# Patient Record
Sex: FEMALE | Race: BLACK OR AFRICAN AMERICAN | NOT HISPANIC OR LATINO | Employment: OTHER | URBAN - METROPOLITAN AREA
[De-identification: names, ages, dates, MRNs, and addresses within clinical notes are randomized per-mention and may not be internally consistent; named-entity substitution may affect disease eponyms.]

---

## 2019-03-01 ENCOUNTER — HOSPITAL ENCOUNTER (EMERGENCY)
Facility: HOSPITAL | Age: 84
Discharge: HOME | End: 2019-03-01
Attending: EMERGENCY MEDICINE
Payer: MEDICARE

## 2019-03-01 VITALS
DIASTOLIC BLOOD PRESSURE: 73 MMHG | WEIGHT: 132 LBS | HEIGHT: 64 IN | SYSTOLIC BLOOD PRESSURE: 141 MMHG | TEMPERATURE: 98 F | OXYGEN SATURATION: 100 % | RESPIRATION RATE: 89 BRPM | HEART RATE: 64 BPM | BODY MASS INDEX: 22.53 KG/M2

## 2019-03-01 DIAGNOSIS — D64.9 ANEMIA, UNSPECIFIED TYPE: ICD-10-CM

## 2019-03-01 DIAGNOSIS — R53.83 FATIGUE, UNSPECIFIED TYPE: Primary | ICD-10-CM

## 2019-03-01 DIAGNOSIS — R53.1 WEAKNESS: ICD-10-CM

## 2019-03-01 LAB
ABO + RH BLD: NORMAL
ALBUMIN SERPL BCP-MCNC: 4.1 G/DL
ALP SERPL-CCNC: 69 U/L
ALT SERPL W/O P-5'-P-CCNC: 11 U/L
ANION GAP SERPL CALC-SCNC: 9 MMOL/L
AST SERPL-CCNC: 18 U/L
BASOPHILS # BLD AUTO: 0.03 K/UL
BASOPHILS NFR BLD: 0.9 %
BILIRUB SERPL-MCNC: 0.8 MG/DL
BILIRUB UR QL STRIP: NEGATIVE
BLD GP AB SCN CELLS X3 SERPL QL: NORMAL
BUN SERPL-MCNC: 22 MG/DL
CALCIUM SERPL-MCNC: 8.8 MG/DL
CHLORIDE SERPL-SCNC: 97 MMOL/L
CLARITY UR: CLEAR
CO2 SERPL-SCNC: 26 MMOL/L
COLOR UR: YELLOW
CREAT SERPL-MCNC: 0.8 MG/DL
DIFFERENTIAL METHOD: ABNORMAL
EOSINOPHIL # BLD AUTO: 0.2 K/UL
EOSINOPHIL NFR BLD: 4.7 %
ERYTHROCYTE [DISTWIDTH] IN BLOOD BY AUTOMATED COUNT: 12.8 %
EST. GFR  (AFRICAN AMERICAN): >60 ML/MIN/1.73 M^2
EST. GFR  (NON AFRICAN AMERICAN): >60 ML/MIN/1.73 M^2
FERRITIN SERPL-MCNC: 391 NG/ML
GLUCOSE SERPL-MCNC: 82 MG/DL
GLUCOSE UR QL STRIP: NEGATIVE
HCT VFR BLD AUTO: 31.9 %
HGB BLD-MCNC: 10.5 G/DL
HGB UR QL STRIP: ABNORMAL
IMM GRANULOCYTES # BLD AUTO: 0.02 K/UL
IMM GRANULOCYTES NFR BLD AUTO: 0.6 %
INR PPP: 1.1
IRON SERPL-MCNC: 71 UG/DL
KETONES UR QL STRIP: NEGATIVE
LEUKOCYTE ESTERASE UR QL STRIP: NEGATIVE
LYMPHOCYTES # BLD AUTO: 1 K/UL
LYMPHOCYTES NFR BLD: 29.5 %
MCH RBC QN AUTO: 30.6 PG
MCHC RBC AUTO-ENTMCNC: 32.9 G/DL
MCV RBC AUTO: 93 FL
MONOCYTES # BLD AUTO: 0.5 K/UL
MONOCYTES NFR BLD: 13.2 %
NEUTROPHILS # BLD AUTO: 1.8 K/UL
NEUTROPHILS NFR BLD: 51.1 %
NITRITE UR QL STRIP: NEGATIVE
NRBC BLD-RTO: 0 /100 WBC
PH UR STRIP: 6 [PH] (ref 5–8)
PLATELET # BLD AUTO: 190 K/UL
PMV BLD AUTO: 10.2 FL
POTASSIUM SERPL-SCNC: 3.6 MMOL/L
PROT SERPL-MCNC: 7.1 G/DL
PROT UR QL STRIP: NEGATIVE
PROTHROMBIN TIME: 13 SEC
RBC # BLD AUTO: 3.43 M/UL
SATURATED IRON: 26 %
SODIUM SERPL-SCNC: 132 MMOL/L
SP GR UR STRIP: 1.01 (ref 1–1.03)
TOTAL IRON BINDING CAPACITY: 272 UG/DL
TRANSFERRIN SERPL-MCNC: 184 MG/DL
TSH SERPL DL<=0.005 MIU/L-ACNC: 3.19 UIU/ML
URN SPEC COLLECT METH UR: ABNORMAL
UROBILINOGEN UR STRIP-ACNC: NEGATIVE EU/DL
VIT B12 SERPL-MCNC: 592 PG/ML
WBC # BLD AUTO: 3.42 K/UL

## 2019-03-01 PROCEDURE — 93005 ELECTROCARDIOGRAM TRACING: CPT

## 2019-03-01 PROCEDURE — 84165 PROTEIN E-PHORESIS SERUM: CPT | Mod: 26,,, | Performed by: PATHOLOGY

## 2019-03-01 PROCEDURE — 84165 PROTEIN E-PHORESIS SERUM: CPT

## 2019-03-01 PROCEDURE — 84165 PATHOLOGIST INTERPRETATION SPE: ICD-10-PCS | Mod: 26,,, | Performed by: PATHOLOGY

## 2019-03-01 PROCEDURE — 36415 COLL VENOUS BLD VENIPUNCTURE: CPT

## 2019-03-01 PROCEDURE — 82728 ASSAY OF FERRITIN: CPT

## 2019-03-01 PROCEDURE — 85610 PROTHROMBIN TIME: CPT

## 2019-03-01 PROCEDURE — 99284 EMERGENCY DEPT VISIT MOD MDM: CPT | Mod: 25

## 2019-03-01 PROCEDURE — 80053 COMPREHEN METABOLIC PANEL: CPT

## 2019-03-01 PROCEDURE — 82607 VITAMIN B-12: CPT

## 2019-03-01 PROCEDURE — 81003 URINALYSIS AUTO W/O SCOPE: CPT

## 2019-03-01 PROCEDURE — 84443 ASSAY THYROID STIM HORMONE: CPT

## 2019-03-01 PROCEDURE — 86901 BLOOD TYPING SEROLOGIC RH(D): CPT

## 2019-03-01 PROCEDURE — 85025 COMPLETE CBC W/AUTO DIFF WBC: CPT

## 2019-03-01 PROCEDURE — 83540 ASSAY OF IRON: CPT

## 2019-03-01 RX ORDER — LOSARTAN POTASSIUM 100 MG/1
100 TABLET ORAL DAILY
Status: ON HOLD | COMMUNITY
End: 2024-01-31

## 2019-03-01 RX ORDER — VIT C/E/ZN/COPPR/LUTEIN/ZEAXAN 250MG-90MG
1000 CAPSULE ORAL DAILY
COMMUNITY
End: 2021-08-06

## 2019-03-01 RX ORDER — LEVOTHYROXINE SODIUM 75 UG/1
75 TABLET ORAL DAILY
COMMUNITY
End: 2021-08-06

## 2019-03-01 RX ORDER — HYDROCHLOROTHIAZIDE 12.5 MG/1
12.5 CAPSULE ORAL DAILY
COMMUNITY
End: 2021-08-06 | Stop reason: SDUPTHER

## 2019-03-01 NOTE — ED NOTES
Received report and assumed care. Patient in bed with eyes closed resting quietly daughter at bedside.  Breathing even and unlabored NAD  Awaiting new orders.

## 2019-03-02 NOTE — ED PROVIDER NOTES
Encounter Date: 3/1/2019       History     Chief Complaint   Patient presents with    Weakness     x3days, pt reports labwork last wednesday with Dr. Emanuel that showed low blood count     88 y F with history of anemia presents with 3 d progressive fatigue     Pt est with Dr Emanuel who recently obtained labs revealing HCT 33    No fever  No chest pain, SOB or cough  No abd pain NVD  No dysuria               Review of patient's allergies indicates:  No Known Allergies  Past Medical History:   Diagnosis Date    Anemia     Constipation     Hypertension     Thyroid disease      History reviewed. No pertinent surgical history.  History reviewed. No pertinent family history.  Social History     Tobacco Use    Smoking status: Never Smoker   Substance Use Topics    Alcohol use: No     Frequency: Never    Drug use: No     Review of Systems   Constitutional: Positive for activity change, appetite change and fatigue.   HENT: Negative.    Respiratory: Negative.    Cardiovascular: Negative.    Gastrointestinal: Negative.    Genitourinary: Negative.    Musculoskeletal: Negative.    Skin: Negative.    Neurological: Negative.    Hematological: Negative.    All other systems reviewed and are negative.      Physical Exam     Initial Vitals [03/01/19 0830]   BP Pulse Resp Temp SpO2   (!) 159/81 68 18 98 °F (36.7 °C) 98 %      MAP       --         Physical Exam    Nursing note and vitals reviewed.  Constitutional: She appears well-developed and well-nourished. She is not diaphoretic. No distress.   HENT:   Head: Normocephalic and atraumatic.   Nose: Nose normal.   Mouth/Throat: Oropharynx is clear and moist. No oropharyngeal exudate.   Eyes: Conjunctivae and EOM are normal. Pupils are equal, round, and reactive to light. Right eye exhibits no discharge. Left eye exhibits no discharge. No scleral icterus.   Neck: Normal range of motion. Neck supple.   Cardiovascular: Normal rate, regular rhythm, normal heart sounds and intact  distal pulses. Exam reveals no gallop and no friction rub.    No murmur heard.  Pulmonary/Chest: Breath sounds normal. No respiratory distress. She has no wheezes. She has no rhonchi. She has no rales.   Abdominal: Soft. Bowel sounds are normal. She exhibits no distension and no mass. There is no tenderness. There is no rebound and no guarding.   Musculoskeletal: Normal range of motion. She exhibits no edema or tenderness.   Lymphadenopathy:     She has no cervical adenopathy.   Neurological: She is alert and oriented to person, place, and time. She has normal strength. No cranial nerve deficit or sensory deficit.   Skin: Skin is warm and dry. Capillary refill takes less than 2 seconds. No rash noted. No erythema. No pallor.   Psychiatric: Her affect is blunt. She is slowed. She is not actively hallucinating. She is attentive.         ED Course   Procedures  Labs Reviewed   CBC W/ AUTO DIFFERENTIAL - Abnormal; Notable for the following components:       Result Value    WBC 3.42 (*)     RBC 3.43 (*)     Hemoglobin 10.5 (*)     Hematocrit 31.9 (*)     Immature Granulocytes 0.6 (*)     All other components within normal limits   COMPREHENSIVE METABOLIC PANEL - Abnormal; Notable for the following components:    Sodium 132 (*)     All other components within normal limits   URINALYSIS, REFLEX TO URINE CULTURE - Abnormal; Notable for the following components:    Occult Blood UA Trace (*)     All other components within normal limits    Narrative:     Preferred Collection Type->Urine, Clean Catch   PROTIME-INR - Abnormal; Notable for the following components:    Prothrombin Time 13.0 (*)     All other components within normal limits   IRON AND TIBC - Abnormal; Notable for the following components:    Transferrin 184 (*)     All other components within normal limits   FERRITIN - Abnormal; Notable for the following components:    Ferritin 391 (*)     All other components within normal limits   TSH   FERRITIN   IRON AND TIBC    PROTEIN ELECTROPHORESIS, SERUM   VITAMIN B12   PROTEIN ELECTROPHORESIS, SERUM   VITAMIN B12   TYPE & SCREEN          Imaging Results    None          Medical Decision Making:   History:   I obtained history from: primary care / consultant.  Clinical Tests:   Lab Tests: Ordered and Reviewed  ED Management:  Discussed with Dr Emanuel after ED labs revealed stable anemia and no obvious infectious cause of fatigue     Reference labs sent per his request     Stable to DC home and follow up next week    Family understands                         Clinical Impression:       ICD-10-CM ICD-9-CM   1. Fatigue, unspecified type R53.83 780.79   2. Weakness R53.1 780.79   3. Anemia, unspecified type D64.9 285.9         Disposition:   Disposition: Discharged  Condition: Stable                        Boy García MD  03/02/19 1040

## 2019-03-04 LAB
ALBUMIN SERPL ELPH-MCNC: 3.87 G/DL
ALPHA1 GLOB SERPL ELPH-MCNC: 0.3 G/DL
ALPHA2 GLOB SERPL ELPH-MCNC: 0.75 G/DL
B-GLOBULIN SERPL ELPH-MCNC: 0.6 G/DL
GAMMA GLOB SERPL ELPH-MCNC: 1.08 G/DL
PATHOLOGIST INTERPRETATION SPE: NORMAL
PROT SERPL-MCNC: 6.6 G/DL

## 2021-08-03 ENCOUNTER — OFFICE VISIT (OUTPATIENT)
Dept: PODIATRY | Facility: CLINIC | Age: 86
End: 2021-08-03
Payer: MEDICARE

## 2021-08-03 VITALS
HEIGHT: 64 IN | WEIGHT: 132 LBS | DIASTOLIC BLOOD PRESSURE: 73 MMHG | BODY MASS INDEX: 22.53 KG/M2 | SYSTOLIC BLOOD PRESSURE: 139 MMHG | RESPIRATION RATE: 18 BRPM | HEART RATE: 74 BPM

## 2021-08-03 DIAGNOSIS — M20.41 HAMMER TOES OF BOTH FEET: ICD-10-CM

## 2021-08-03 DIAGNOSIS — L97.521 ULCER OF LEFT SECOND TOE, LIMITED TO BREAKDOWN OF SKIN: Primary | ICD-10-CM

## 2021-08-03 DIAGNOSIS — B35.1 ONYCHOMYCOSIS OF TOENAIL: ICD-10-CM

## 2021-08-03 DIAGNOSIS — R60.0 EDEMA, LOWER EXTREMITY: ICD-10-CM

## 2021-08-03 DIAGNOSIS — M20.42 HAMMER TOES OF BOTH FEET: ICD-10-CM

## 2021-08-03 DIAGNOSIS — M20.11 HALLUX ABDUCTO VALGUS, BILATERAL: ICD-10-CM

## 2021-08-03 DIAGNOSIS — L84 FOOT CALLUS: ICD-10-CM

## 2021-08-03 DIAGNOSIS — M20.12 HALLUX ABDUCTO VALGUS, BILATERAL: ICD-10-CM

## 2021-08-03 PROCEDURE — 99204 PR OFFICE/OUTPT VISIT, NEW, LEVL IV, 45-59 MIN: ICD-10-PCS | Mod: S$PBB,,, | Performed by: PODIATRIST

## 2021-08-03 PROCEDURE — 99999 PR PBB SHADOW E&M-EST. PATIENT-LVL IV: CPT | Mod: PBBFAC,,, | Performed by: PODIATRIST

## 2021-08-03 PROCEDURE — 99999 PR PBB SHADOW E&M-EST. PATIENT-LVL IV: ICD-10-PCS | Mod: PBBFAC,,, | Performed by: PODIATRIST

## 2021-08-03 PROCEDURE — 99204 OFFICE O/P NEW MOD 45 MIN: CPT | Mod: S$PBB,,, | Performed by: PODIATRIST

## 2021-08-03 PROCEDURE — 99214 OFFICE O/P EST MOD 30 MIN: CPT | Mod: PBBFAC | Performed by: PODIATRIST

## 2021-08-03 RX ORDER — HYDROCHLOROTHIAZIDE 12.5 MG/1
TABLET ORAL
COMMUNITY
End: 2022-04-15 | Stop reason: SDUPTHER

## 2021-08-03 RX ORDER — LOSARTAN POTASSIUM 100 MG/1
TABLET ORAL
COMMUNITY
End: 2021-08-06 | Stop reason: SDUPTHER

## 2021-08-03 RX ORDER — GATIFLOXACIN 5 MG/ML
SOLUTION/ DROPS OPHTHALMIC
COMMUNITY
End: 2021-08-06

## 2021-08-03 RX ORDER — TELMISARTAN 80 MG/1
TABLET ORAL
Status: ON HOLD | COMMUNITY
Start: 2021-06-22 | End: 2024-01-31

## 2021-08-03 RX ORDER — CYANOCOBALAMIN 1000 UG/ML
INJECTION, SOLUTION INTRAMUSCULAR; SUBCUTANEOUS
Status: ON HOLD | COMMUNITY
Start: 2021-06-22 | End: 2024-01-31

## 2021-08-03 RX ORDER — IRON,CARBONYL/ASCORBIC ACID 100-250 MG
TABLET ORAL
Status: ON HOLD | COMMUNITY
Start: 2021-07-01 | End: 2024-01-31

## 2021-08-03 RX ORDER — LEVOTHYROXINE SODIUM 100 UG/1
TABLET ORAL
COMMUNITY
Start: 2021-06-15 | End: 2022-10-29 | Stop reason: SDUPTHER

## 2021-08-03 RX ORDER — LEVOTHYROXINE SODIUM 75 UG/1
TABLET ORAL
COMMUNITY
End: 2021-08-06

## 2021-08-03 RX ORDER — PREDNISOLONE ACETATE 10 MG/ML
SUSPENSION/ DROPS OPHTHALMIC
COMMUNITY
End: 2021-08-06

## 2021-08-03 RX ORDER — FERROUS SULFATE 325(65) MG
TABLET ORAL
Status: ON HOLD | COMMUNITY
End: 2024-01-31

## 2021-08-03 RX ORDER — ERYTHROMYCIN 5 MG/G
OINTMENT OPHTHALMIC
Status: ON HOLD | COMMUNITY
Start: 2021-07-30 | End: 2024-01-31 | Stop reason: HOSPADM

## 2021-08-09 ENCOUNTER — TELEPHONE (OUTPATIENT)
Dept: PODIATRY | Facility: CLINIC | Age: 86
End: 2021-08-09

## 2021-08-09 DIAGNOSIS — L97.521 ULCER OF LEFT SECOND TOE, LIMITED TO BREAKDOWN OF SKIN: Primary | ICD-10-CM

## 2021-08-09 RX ORDER — DOXYCYCLINE 100 MG/1
100 CAPSULE ORAL EVERY 12 HOURS
Qty: 20 CAPSULE | Refills: 0 | Status: SHIPPED | OUTPATIENT
Start: 2021-08-09 | End: 2021-08-19

## 2021-08-31 ENCOUNTER — HOSPITAL ENCOUNTER (EMERGENCY)
Facility: HOSPITAL | Age: 86
Discharge: HOME | End: 2021-08-31
Attending: EMERGENCY MEDICINE
Payer: MEDICARE

## 2021-08-31 VITALS
TEMPERATURE: 99 F | WEIGHT: 121 LBS | RESPIRATION RATE: 16 BRPM | HEART RATE: 70 BPM | SYSTOLIC BLOOD PRESSURE: 158 MMHG | OXYGEN SATURATION: 99 % | BODY MASS INDEX: 20.66 KG/M2 | DIASTOLIC BLOOD PRESSURE: 77 MMHG | HEIGHT: 64 IN

## 2021-08-31 DIAGNOSIS — R53.1 WEAKNESS: ICD-10-CM

## 2021-08-31 LAB
ALBUMIN SERPL BCP-MCNC: 3.5 G/DL (ref 3.5–5.2)
ALP SERPL-CCNC: 71 U/L (ref 55–135)
ALT SERPL W/O P-5'-P-CCNC: 13 U/L (ref 10–44)
ANION GAP SERPL CALC-SCNC: 12 MMOL/L (ref 8–16)
AST SERPL-CCNC: 20 U/L (ref 10–40)
BACTERIA #/AREA URNS HPF: NORMAL /HPF
BASOPHILS # BLD AUTO: 0.02 K/UL (ref 0–0.2)
BASOPHILS NFR BLD: 0.4 % (ref 0–1.9)
BILIRUB SERPL-MCNC: 0.6 MG/DL (ref 0.1–1)
BILIRUB UR QL STRIP: NEGATIVE
BNP SERPL-MCNC: 40 PG/ML (ref 0–99)
BUN SERPL-MCNC: 17 MG/DL (ref 10–30)
CALCIUM SERPL-MCNC: 9.6 MG/DL (ref 8.7–10.5)
CHLORIDE SERPL-SCNC: 102 MMOL/L (ref 95–110)
CLARITY UR: CLEAR
CO2 SERPL-SCNC: 26 MMOL/L (ref 23–29)
COLOR UR: YELLOW
CREAT SERPL-MCNC: 0.8 MG/DL (ref 0.5–1.4)
DIFFERENTIAL METHOD: ABNORMAL
EOSINOPHIL # BLD AUTO: 0.1 K/UL (ref 0–0.5)
EOSINOPHIL NFR BLD: 2.7 % (ref 0–8)
ERYTHROCYTE [DISTWIDTH] IN BLOOD BY AUTOMATED COUNT: 13.3 % (ref 11.5–14.5)
EST. GFR  (AFRICAN AMERICAN): >60 ML/MIN/1.73 M^2
EST. GFR  (NON AFRICAN AMERICAN): >60 ML/MIN/1.73 M^2
GLUCOSE SERPL-MCNC: 82 MG/DL (ref 70–110)
GLUCOSE UR QL STRIP: NEGATIVE
HCT VFR BLD AUTO: 34.2 % (ref 37–48.5)
HGB BLD-MCNC: 11.4 G/DL (ref 12–16)
HGB UR QL STRIP: ABNORMAL
IMM GRANULOCYTES # BLD AUTO: 0.01 K/UL (ref 0–0.04)
IMM GRANULOCYTES NFR BLD AUTO: 0.2 % (ref 0–0.5)
KETONES UR QL STRIP: NEGATIVE
LEUKOCYTE ESTERASE UR QL STRIP: ABNORMAL
LYMPHOCYTES # BLD AUTO: 1.6 K/UL (ref 1–4.8)
LYMPHOCYTES NFR BLD: 32.8 % (ref 18–48)
MCH RBC QN AUTO: 31.2 PG (ref 27–31)
MCHC RBC AUTO-ENTMCNC: 33.3 G/DL (ref 32–36)
MCV RBC AUTO: 94 FL (ref 82–98)
MICROSCOPIC COMMENT: NORMAL
MONOCYTES # BLD AUTO: 0.5 K/UL (ref 0.3–1)
MONOCYTES NFR BLD: 10 % (ref 4–15)
NEUTROPHILS # BLD AUTO: 2.6 K/UL (ref 1.8–7.7)
NEUTROPHILS NFR BLD: 53.9 % (ref 38–73)
NITRITE UR QL STRIP: NEGATIVE
NRBC BLD-RTO: 0 /100 WBC
PH UR STRIP: 6 [PH] (ref 5–8)
PLATELET # BLD AUTO: 163 K/UL (ref 150–450)
PMV BLD AUTO: 11.3 FL (ref 9.2–12.9)
POTASSIUM SERPL-SCNC: 3.5 MMOL/L (ref 3.5–5.1)
PROT SERPL-MCNC: 6.7 G/DL (ref 6–8.4)
PROT UR QL STRIP: NEGATIVE
RBC # BLD AUTO: 3.65 M/UL (ref 4–5.4)
RBC #/AREA URNS HPF: 1 /HPF (ref 0–4)
SARS-COV-2 RDRP RESP QL NAA+PROBE: NEGATIVE
SODIUM SERPL-SCNC: 140 MMOL/L (ref 136–145)
SP GR UR STRIP: 1.01 (ref 1–1.03)
SQUAMOUS #/AREA URNS HPF: 0 /HPF
TROPONIN I SERPL DL<=0.01 NG/ML-MCNC: 0.03 NG/ML (ref 0–0.03)
URN SPEC COLLECT METH UR: 4
UROBILINOGEN UR STRIP-ACNC: NEGATIVE EU/DL
WBC # BLD AUTO: 4.79 K/UL (ref 3.9–12.7)
WBC #/AREA URNS HPF: 1 /HPF (ref 0–5)

## 2021-08-31 PROCEDURE — U0002 COVID-19 LAB TEST NON-CDC: HCPCS | Performed by: EMERGENCY MEDICINE

## 2021-08-31 PROCEDURE — 81000 URINALYSIS NONAUTO W/SCOPE: CPT | Performed by: EMERGENCY MEDICINE

## 2021-08-31 PROCEDURE — 80053 COMPREHEN METABOLIC PANEL: CPT | Performed by: EMERGENCY MEDICINE

## 2021-08-31 PROCEDURE — 93010 EKG 12-LEAD: ICD-10-PCS | Mod: ,,, | Performed by: INTERNAL MEDICINE

## 2021-08-31 PROCEDURE — 85025 COMPLETE CBC W/AUTO DIFF WBC: CPT | Performed by: EMERGENCY MEDICINE

## 2021-08-31 PROCEDURE — 93005 ELECTROCARDIOGRAM TRACING: CPT

## 2021-08-31 PROCEDURE — 84484 ASSAY OF TROPONIN QUANT: CPT | Performed by: EMERGENCY MEDICINE

## 2021-08-31 PROCEDURE — 99284 EMERGENCY DEPT VISIT MOD MDM: CPT | Mod: 25

## 2021-08-31 PROCEDURE — 83880 ASSAY OF NATRIURETIC PEPTIDE: CPT | Performed by: EMERGENCY MEDICINE

## 2021-08-31 PROCEDURE — 36415 COLL VENOUS BLD VENIPUNCTURE: CPT | Performed by: EMERGENCY MEDICINE

## 2021-08-31 PROCEDURE — 36000 PLACE NEEDLE IN VEIN: CPT

## 2021-08-31 PROCEDURE — 93010 ELECTROCARDIOGRAM REPORT: CPT | Mod: ,,, | Performed by: INTERNAL MEDICINE

## 2021-09-02 ENCOUNTER — OFFICE VISIT (OUTPATIENT)
Dept: PODIATRY | Facility: CLINIC | Age: 86
End: 2021-09-02
Payer: MEDICARE

## 2021-09-02 VITALS
DIASTOLIC BLOOD PRESSURE: 69 MMHG | SYSTOLIC BLOOD PRESSURE: 124 MMHG | HEIGHT: 64 IN | HEART RATE: 79 BPM | BODY MASS INDEX: 20.66 KG/M2 | WEIGHT: 121 LBS

## 2021-09-02 DIAGNOSIS — M20.42 HAMMER TOES OF BOTH FEET: ICD-10-CM

## 2021-09-02 DIAGNOSIS — M20.12 HALLUX ABDUCTO VALGUS, BILATERAL: ICD-10-CM

## 2021-09-02 DIAGNOSIS — M20.11 HALLUX ABDUCTO VALGUS, BILATERAL: ICD-10-CM

## 2021-09-02 DIAGNOSIS — L97.521 ULCER OF LEFT SECOND TOE, LIMITED TO BREAKDOWN OF SKIN: Primary | ICD-10-CM

## 2021-09-02 DIAGNOSIS — R60.0 EDEMA, LOWER EXTREMITY: ICD-10-CM

## 2021-09-02 DIAGNOSIS — M20.41 HAMMER TOES OF BOTH FEET: ICD-10-CM

## 2021-09-02 PROCEDURE — 99999 PR PBB SHADOW E&M-EST. PATIENT-LVL III: ICD-10-PCS | Mod: PBBFAC,,, | Performed by: PODIATRIST

## 2021-09-02 PROCEDURE — 99213 OFFICE O/P EST LOW 20 MIN: CPT | Mod: PBBFAC | Performed by: PODIATRIST

## 2021-09-02 PROCEDURE — 99999 PR PBB SHADOW E&M-EST. PATIENT-LVL III: CPT | Mod: PBBFAC,,, | Performed by: PODIATRIST

## 2021-09-02 PROCEDURE — 99213 OFFICE O/P EST LOW 20 MIN: CPT | Mod: S$PBB,,, | Performed by: PODIATRIST

## 2021-09-02 PROCEDURE — 99213 PR OFFICE/OUTPT VISIT, EST, LEVL III, 20-29 MIN: ICD-10-PCS | Mod: S$PBB,,, | Performed by: PODIATRIST

## 2021-10-02 ENCOUNTER — HOSPITAL ENCOUNTER (EMERGENCY)
Facility: HOSPITAL | Age: 86
Discharge: HOME | End: 2021-10-02
Attending: INTERNAL MEDICINE
Payer: MEDICARE

## 2021-10-02 VITALS
BODY MASS INDEX: 21.34 KG/M2 | WEIGHT: 125 LBS | RESPIRATION RATE: 18 BRPM | SYSTOLIC BLOOD PRESSURE: 156 MMHG | HEART RATE: 60 BPM | TEMPERATURE: 99 F | DIASTOLIC BLOOD PRESSURE: 75 MMHG | HEIGHT: 64 IN | OXYGEN SATURATION: 100 %
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
## 2021-10-02 DIAGNOSIS — S16.1XXA CERVICAL STRAIN, ACUTE, INITIAL ENCOUNTER: ICD-10-CM

## 2021-10-02 DIAGNOSIS — S00.83XA FOREHEAD CONTUSION, INITIAL ENCOUNTER: ICD-10-CM

## 2021-10-02 LAB
BASOPHILS # BLD AUTO: 0.03 K/UL (ref 0–0.2)
BASOPHILS NFR BLD: 0.8 % (ref 0–1.9)
DIFFERENTIAL METHOD: ABNORMAL
EOSINOPHIL # BLD AUTO: 0.3 K/UL (ref 0–0.5)
EOSINOPHIL NFR BLD: 7.3 % (ref 0–8)
ERYTHROCYTE [DISTWIDTH] IN BLOOD BY AUTOMATED COUNT: 13.7 % (ref 11.5–14.5)
HCT VFR BLD AUTO: 30.9 % (ref 37–48.5)
HGB BLD-MCNC: 10.1 G/DL (ref 12–16)
IMM GRANULOCYTES # BLD AUTO: 0.01 K/UL (ref 0–0.04)
IMM GRANULOCYTES NFR BLD AUTO: 0.3 % (ref 0–0.5)
LYMPHOCYTES # BLD AUTO: 0.9 K/UL (ref 1–4.8)
LYMPHOCYTES NFR BLD: 24.6 % (ref 18–48)
MCH RBC QN AUTO: 30.8 PG (ref 27–31)
MCHC RBC AUTO-ENTMCNC: 32.7 G/DL (ref 32–36)
MCV RBC AUTO: 94 FL (ref 82–98)
MONOCYTES # BLD AUTO: 0.4 K/UL (ref 0.3–1)
MONOCYTES NFR BLD: 11.5 % (ref 4–15)
NEUTROPHILS # BLD AUTO: 2 K/UL (ref 1.8–7.7)
NEUTROPHILS NFR BLD: 55.5 % (ref 38–73)
NRBC BLD-RTO: 0 /100 WBC
PLATELET # BLD AUTO: 176 K/UL (ref 150–450)
PMV BLD AUTO: 9.8 FL (ref 9.2–12.9)
RBC # BLD AUTO: 3.28 M/UL (ref 4–5.4)
WBC # BLD AUTO: 3.57 K/UL (ref 3.9–12.7)

## 2021-10-02 PROCEDURE — 70450 CT HEAD WITHOUT CONTRAST: ICD-10-PCS | Mod: 26,,, | Performed by: RADIOLOGY

## 2021-10-02 PROCEDURE — 99284 EMERGENCY DEPT VISIT MOD MDM: CPT | Mod: 25

## 2021-10-02 PROCEDURE — 72125 CT NECK SPINE W/O DYE: CPT | Mod: 26,,, | Performed by: RADIOLOGY

## 2021-10-02 PROCEDURE — 70450 CT HEAD/BRAIN W/O DYE: CPT | Mod: TC

## 2021-10-02 PROCEDURE — 70450 CT HEAD/BRAIN W/O DYE: CPT | Mod: 26,,, | Performed by: RADIOLOGY

## 2021-10-02 PROCEDURE — 85025 COMPLETE CBC W/AUTO DIFF WBC: CPT | Performed by: INTERNAL MEDICINE

## 2021-10-02 PROCEDURE — 72125 CT NECK SPINE W/O DYE: CPT | Mod: TC

## 2021-10-02 PROCEDURE — 72125 CT CERVICAL SPINE WITHOUT CONTRAST: ICD-10-PCS | Mod: 26,,, | Performed by: RADIOLOGY

## 2021-10-05 ENCOUNTER — OFFICE VISIT (OUTPATIENT)
Dept: PODIATRY | Facility: CLINIC | Age: 86
End: 2021-10-05
Payer: MEDICARE

## 2021-10-05 VITALS
HEART RATE: 79 BPM | RESPIRATION RATE: 18 BRPM | DIASTOLIC BLOOD PRESSURE: 68 MMHG | SYSTOLIC BLOOD PRESSURE: 140 MMHG | WEIGHT: 125 LBS | HEIGHT: 64 IN | BODY MASS INDEX: 21.34 KG/M2

## 2021-10-05 DIAGNOSIS — M20.11 HALLUX ABDUCTO VALGUS, BILATERAL: ICD-10-CM

## 2021-10-05 DIAGNOSIS — M20.42 HAMMER TOES OF BOTH FEET: ICD-10-CM

## 2021-10-05 DIAGNOSIS — L97.521 ULCER OF LEFT SECOND TOE, LIMITED TO BREAKDOWN OF SKIN: Primary | ICD-10-CM

## 2021-10-05 DIAGNOSIS — L84 FOOT CALLUS: ICD-10-CM

## 2021-10-05 DIAGNOSIS — R60.0 EDEMA, LOWER EXTREMITY: ICD-10-CM

## 2021-10-05 DIAGNOSIS — M20.41 HAMMER TOES OF BOTH FEET: ICD-10-CM

## 2021-10-05 DIAGNOSIS — M20.12 HALLUX ABDUCTO VALGUS, BILATERAL: ICD-10-CM

## 2021-10-05 PROCEDURE — 99214 OFFICE O/P EST MOD 30 MIN: CPT | Mod: S$PBB,,, | Performed by: PODIATRIST

## 2021-10-05 PROCEDURE — 99999 PR PBB SHADOW E&M-EST. PATIENT-LVL IV: ICD-10-PCS | Mod: PBBFAC,,, | Performed by: PODIATRIST

## 2021-10-05 PROCEDURE — 99214 OFFICE O/P EST MOD 30 MIN: CPT | Mod: PBBFAC | Performed by: PODIATRIST

## 2021-10-05 PROCEDURE — 99214 PR OFFICE/OUTPT VISIT, EST, LEVL IV, 30-39 MIN: ICD-10-PCS | Mod: S$PBB,,, | Performed by: PODIATRIST

## 2021-10-05 PROCEDURE — 99999 PR PBB SHADOW E&M-EST. PATIENT-LVL IV: CPT | Mod: PBBFAC,,, | Performed by: PODIATRIST

## 2021-10-05 RX ORDER — HYDROCHLOROTHIAZIDE 12.5 MG/1
12.5 CAPSULE ORAL EVERY MORNING
COMMUNITY
Start: 2021-09-28 | End: 2023-07-09

## 2022-01-11 ENCOUNTER — OFFICE VISIT (OUTPATIENT)
Dept: PODIATRY | Facility: CLINIC | Age: 87
End: 2022-01-11
Payer: MEDICARE

## 2022-01-11 VITALS
BODY MASS INDEX: 20.66 KG/M2 | HEIGHT: 64 IN | RESPIRATION RATE: 18 BRPM | SYSTOLIC BLOOD PRESSURE: 158 MMHG | DIASTOLIC BLOOD PRESSURE: 85 MMHG | WEIGHT: 121 LBS | HEART RATE: 73 BPM

## 2022-01-11 DIAGNOSIS — M79.675 PAIN AND SWELLING OF TOE OF LEFT FOOT: ICD-10-CM

## 2022-01-11 DIAGNOSIS — M20.42 HAMMERTOE OF SECOND TOE OF LEFT FOOT: Primary | ICD-10-CM

## 2022-01-11 DIAGNOSIS — M20.41 HAMMER TOES OF BOTH FEET: ICD-10-CM

## 2022-01-11 DIAGNOSIS — M20.42 HAMMER TOES OF BOTH FEET: ICD-10-CM

## 2022-01-11 DIAGNOSIS — R60.0 EDEMA, LOWER EXTREMITY: ICD-10-CM

## 2022-01-11 DIAGNOSIS — M20.11 HALLUX ABDUCTO VALGUS, BILATERAL: ICD-10-CM

## 2022-01-11 DIAGNOSIS — M20.12 HALLUX ABDUCTO VALGUS, BILATERAL: ICD-10-CM

## 2022-01-11 DIAGNOSIS — M79.89 PAIN AND SWELLING OF TOE OF LEFT FOOT: ICD-10-CM

## 2022-01-11 DIAGNOSIS — B35.1 ONYCHOMYCOSIS OF TOENAIL: ICD-10-CM

## 2022-01-11 DIAGNOSIS — L84 FOOT CALLUS: ICD-10-CM

## 2022-01-11 PROCEDURE — 99214 OFFICE O/P EST MOD 30 MIN: CPT | Mod: PBBFAC | Performed by: PODIATRIST

## 2022-01-11 PROCEDURE — 99214 PR OFFICE/OUTPT VISIT, EST, LEVL IV, 30-39 MIN: ICD-10-PCS | Mod: S$PBB,,, | Performed by: PODIATRIST

## 2022-01-11 PROCEDURE — 99999 PR PBB SHADOW E&M-EST. PATIENT-LVL IV: CPT | Mod: PBBFAC,,, | Performed by: PODIATRIST

## 2022-01-11 PROCEDURE — 99999 PR PBB SHADOW E&M-EST. PATIENT-LVL IV: ICD-10-PCS | Mod: PBBFAC,,, | Performed by: PODIATRIST

## 2022-01-11 PROCEDURE — 99214 OFFICE O/P EST MOD 30 MIN: CPT | Mod: S$PBB,,, | Performed by: PODIATRIST

## 2022-01-15 NOTE — PROGRESS NOTES
Subjective:       Patient ID: Re Arciniega is a 89 y.o. female.    Chief Complaint: Follow-up, Diabetes Mellitus, Heel Pain, Wound Check, and Callouses  Patient presents with her daughter with complaint of pain 2nd digit left foot, follow-up hammertoe with pre ulcerative callus, follow-up callus posterior heels bilateral.  Patient daughter states she is doing well, no significant changes in health or medications since last visit.  Patient does relate 2nd digit left foot remains sore but is improved.  Daughter states there has been no redness or drainage of the toe.  They have not been applying padding between the toes      Past Medical History:   Diagnosis Date    Anemia     Constipation     Hypertension     Thyroid disease      Past Surgical History:   Procedure Laterality Date    CHOLECYSTECTOMY      HYSTERECTOMY       History reviewed. No pertinent family history.  Social History     Socioeconomic History    Marital status:    Tobacco Use    Smoking status: Never Smoker    Smokeless tobacco: Never Used   Substance and Sexual Activity    Alcohol use: No    Drug use: No    Sexual activity: Never       Current Outpatient Medications   Medication Sig Dispense Refill    cyanocobalamin 1,000 mcg/mL injection       docusate sodium (COLACE) 50 MG capsule Take by mouth 2 (two) times daily.      erythromycin (ROMYCIN) ophthalmic ointment       EUTHYROX 100 mcg tablet       ferrous sulfate (FEOSOL) 325 mg (65 mg iron) Tab tablet ferrous sulfate 325 mg (65 mg iron) tablet      hydroCHLOROthiazide (HYDRODIURIL) 12.5 MG Tab hydrochlorothiazide 12.5 mg tablet      hydroCHLOROthiazide (MICROZIDE) 12.5 mg capsule Take 12.5 mg by mouth every morning.      iron-vitamin C 100-250 mg, ICAR-C, 100-250 mg Tab       losartan (COZAAR) 100 MG tablet Take 100 mg by mouth once daily.      telmisartan (MICARDIS) 80 MG Tab        No current facility-administered medications for this visit.     Review of patient's  "allergies indicates:  No Known Allergies    Review of Systems   HENT: Negative for congestion.    Respiratory: Negative for cough and shortness of breath.    Cardiovascular: Positive for leg swelling.   Musculoskeletal: Positive for gait problem.        Walker   All other systems reviewed and are negative.      Objective:      Vitals:    01/11/22 1552   BP: (!) 158/85   Pulse: 73   Resp: 18   Weight: 54.9 kg (121 lb)   Height: 5' 4" (1.626 m)     Physical Exam  Vitals and nursing note reviewed. Exam conducted with a chaperone present.   Constitutional:       General: She is not in acute distress.  Cardiovascular:      Pulses:           Dorsalis pedis pulses are 2+ on the right side and 2+ on the left side.        Posterior tibial pulses are 1+ on the right side and 1+ on the left side.   Pulmonary:      Effort: Pulmonary effort is normal.   Musculoskeletal:         General: Swelling and deformity present.      Right foot: Decreased range of motion. Deformity (hammertoes) and bunion present.      Left foot: Decreased range of motion. Deformity and bunion present.   Feet:      Right foot:      Skin integrity: Callus (Chronic symmetrical calluses posterior lateral heels, stable) present. No skin breakdown.      Toenail Condition: Right toenails are abnormally thick and long. Fungal disease present.     Left foot:      Skin integrity: Callus (Tender callus medial 2nd digit left foot, is improved with no discoloration or skin opening.  Mild edema, no erythema or calor. IPK plantar left heel., no pain, stable) present. No ulcer or skin breakdown.      Toenail Condition: Left toenails are abnormally thick and long. Fungal disease present.  Skin:     Capillary Refill: Capillary refill takes 2 to 3 seconds.   Neurological:      General: No focal deficit present.   Psychiatric:         Mood and Affect: Mood normal.         Behavior: Behavior normal.                                Assessment:       1. Hammertoe of second toe " of left foot    2. Pain and swelling of toe of left foot    3. Hallux abducto valgus, bilateral    4. Hammer toes of both feet    5. Edema, lower extremity    6. Foot callus    7. Onychomycosis of toenail        Plan:           Advised patient from the top of the toe not much change but area between the toes which was previously an open ulcer has improved significantly with a lot left callus.  Explained tenderness along the edge of the toe, the dark area on the medial aspect which she can see and feel is due to pressure and will not resolved in last she/family member helps to apply a thin light soft pad which is comfortable each morning and removed each evening.  We did review area can ulcerate again, check daily  Area was debrided, remains pre ulcerative with no complications.  Dispensed large silo post toe sock and showed patient how to apply.  She does feel she can apply it this pad daily and instructed always remove in the evening.  Do not wear to bed and make sure skin underneath stays clean and dry  Reviewed pre ulcerative heel calluses remain improved.  Are discolored but not as thick, appearance of skin much improved.  Areas were debrided posterior heel bilateral with no complications.  IPK plantar left heel debrided  Reviewed better maintenance of skin and we discussed maintenance of callus 2nd digit left foot.  Explained chronic swelling feet and lower legs contributes to pressure on all of these areas.  Monitor skin on the front of the legs for any changes, blistering, weeping or areas of redness or warmth  Reviewed fungal nails. Nails debrided in thickness reduced, reviewed better maintenance of nails, tea tree oil, soaking  Again reviewed appropriate shoes  Check feet daily, contact office with any area of concern which has not improved in a few days  Patient/family were in understanding and agreement with treatment plan.  I counseled the patient on their conditions, implications and medical  management.  Instructed patient/family to contact the office with any changes, questions, concerns, worsening of symptoms.   Total face-to-face time, exam, assessment, treatment, discussion, documentation 30 minutes, more than half this time spent on consultation and coordination of care.  Follow up prn 3 months      This note was created using M*[employer] voice recognition software that occasionally misinterpreted phrases or words.

## 2022-04-12 ENCOUNTER — OFFICE VISIT (OUTPATIENT)
Dept: PODIATRY | Facility: CLINIC | Age: 87
End: 2022-04-12
Payer: MEDICARE

## 2022-04-12 VITALS
HEART RATE: 76 BPM | BODY MASS INDEX: 20.77 KG/M2 | RESPIRATION RATE: 18 BRPM | DIASTOLIC BLOOD PRESSURE: 65 MMHG | WEIGHT: 121 LBS | SYSTOLIC BLOOD PRESSURE: 132 MMHG

## 2022-04-12 DIAGNOSIS — M20.42 HAMMERTOE OF SECOND TOE OF LEFT FOOT: ICD-10-CM

## 2022-04-12 DIAGNOSIS — L97.521 ULCER OF LEFT SECOND TOE, LIMITED TO BREAKDOWN OF SKIN: Primary | ICD-10-CM

## 2022-04-12 DIAGNOSIS — R60.0 EDEMA, LOWER EXTREMITY: ICD-10-CM

## 2022-04-12 DIAGNOSIS — L97.511 SKIN ULCER OF SECOND TOE, RIGHT, LIMITED TO BREAKDOWN OF SKIN: ICD-10-CM

## 2022-04-12 DIAGNOSIS — M20.12 HALLUX ABDUCTO VALGUS, BILATERAL: ICD-10-CM

## 2022-04-12 DIAGNOSIS — B35.1 ONYCHOMYCOSIS OF TOENAIL: ICD-10-CM

## 2022-04-12 DIAGNOSIS — M20.11 HALLUX ABDUCTO VALGUS, BILATERAL: ICD-10-CM

## 2022-04-12 PROCEDURE — 99214 OFFICE O/P EST MOD 30 MIN: CPT | Mod: S$PBB,,, | Performed by: PODIATRIST

## 2022-04-12 PROCEDURE — 99999 PR PBB SHADOW E&M-EST. PATIENT-LVL IV: CPT | Mod: PBBFAC,,, | Performed by: PODIATRIST

## 2022-04-12 PROCEDURE — 99214 PR OFFICE/OUTPT VISIT, EST, LEVL IV, 30-39 MIN: ICD-10-PCS | Mod: S$PBB,,, | Performed by: PODIATRIST

## 2022-04-12 PROCEDURE — 99999 PR PBB SHADOW E&M-EST. PATIENT-LVL IV: ICD-10-PCS | Mod: PBBFAC,,, | Performed by: PODIATRIST

## 2022-04-12 PROCEDURE — 99214 OFFICE O/P EST MOD 30 MIN: CPT | Mod: PBBFAC | Performed by: PODIATRIST

## 2022-04-12 RX ORDER — LEVOTHYROXINE SODIUM 88 UG/1
TABLET ORAL
Status: ON HOLD | COMMUNITY
Start: 2022-03-29 | End: 2024-01-31

## 2022-04-15 NOTE — PROGRESS NOTES
Subjective:       Patient ID: Re Arciniega is a 89 y.o. female.    Chief Complaint: Follow-up, Callouses, Nail Problem, Foot Pain, and Hammer Toe  Patient presents with her daughter for follow-up ulcer 2nd digit left foot, calluses both heels.  Patient relates applying moisturizer/lotion to the 2nd digit left foot daily as it helps with her pain.  Confirms wearing wide comfortable shoes, but left 2nd digit hurts all the time.  She is having a lot of pain due to callus on the bottom the right heel.  Relates calluses on the back of both heels are tender.  Will turn 89 tomorrow      Past Medical History:   Diagnosis Date    Anemia     Constipation     Hypertension     Thyroid disease      Past Surgical History:   Procedure Laterality Date    CHOLECYSTECTOMY      HYSTERECTOMY       History reviewed. No pertinent family history.  Social History     Socioeconomic History    Marital status:    Tobacco Use    Smoking status: Never Smoker    Smokeless tobacco: Never Used   Substance and Sexual Activity    Alcohol use: No    Drug use: No    Sexual activity: Never       Current Outpatient Medications   Medication Sig Dispense Refill    cyanocobalamin 1,000 mcg/mL injection       docusate sodium (COLACE) 50 MG capsule Take by mouth 2 (two) times daily.      erythromycin (ROMYCIN) ophthalmic ointment       EUTHYROX 100 mcg tablet       ferrous sulfate (FEOSOL) 325 mg (65 mg iron) Tab tablet ferrous sulfate 325 mg (65 mg iron) tablet      hydroCHLOROthiazide (MICROZIDE) 12.5 mg capsule Take 12.5 mg by mouth every morning.      iron-vitamin C 100-250 mg, ICAR-C, 100-250 mg Tab       levothyroxine (SYNTHROID) 88 MCG tablet       losartan (COZAAR) 100 MG tablet Take 100 mg by mouth once daily.      telmisartan (MICARDIS) 80 MG Tab        No current facility-administered medications for this visit.     Review of patient's allergies indicates:  No Known Allergies    Review of Systems   HENT: Negative for  congestion.    Respiratory: Negative for cough and shortness of breath.    Cardiovascular: Positive for leg swelling.   Musculoskeletal: Positive for gait problem.        Walker   All other systems reviewed and are negative.      Objective:      Vitals:    04/12/22 1525   BP: 132/65   Pulse: 76   Resp: 18   Weight: 54.9 kg (121 lb)     Physical Exam  Vitals and nursing note reviewed. Exam conducted with a chaperone present.   Constitutional:       General: She is not in acute distress.  Cardiovascular:      Pulses:           Dorsalis pedis pulses are 2+ on the right side and 2+ on the left side.        Posterior tibial pulses are 1+ on the right side and 1+ on the left side.   Pulmonary:      Effort: Pulmonary effort is normal.   Musculoskeletal:         General: Swelling and deformity present.      Right foot: Decreased range of motion. Deformity (hammertoes) and bunion present.      Left foot: Decreased range of motion. Deformity and bunion present.   Feet:      Right foot:      Skin integrity: Ulcer (Light pink area of depression, pre ulcerative medial 2nd digit right foot, no edema or drainage) and callus (callus posterior lateral heel, dry, stable. Tender IPK right heel, no discoloration) present. No skin breakdown.      Toenail Condition: Right toenails are long. Fungal disease present.     Left foot:      Skin integrity: Ulcer (Ulcer medial 2nd digit left foot has opened up again and presents as superficial granular tissue with some maceration, blister-like.  Digit is edematous, no calor or drainage) and callus (lateral left heel, dry, stable) present. No skin breakdown.      Toenail Condition: Left toenails are long. Fungal disease present.  Skin:     Capillary Refill: Capillary refill takes 2 to 3 seconds.   Neurological:      General: No focal deficit present.   Psychiatric:         Mood and Affect: Mood normal.         Behavior: Behavior normal.                                                             Assessment:       1. Ulcer of left second toe, limited to breakdown of skin    2. Skin ulcer of second toe, right, limited to breakdown of skin    3. Hammertoe of second toe of left foot    4. Hallux abducto valgus, bilateral    5. Edema, lower extremity    6. Onychomycosis of toenail        Plan:         Advised patient in daughter it is difficult area to treat between the toes due to constant rubbing and moisture.  Developing a scab over the area to start the healing process can become sore since it is hard and dry, however with the application of moisturizer lotion to the area this sore will never heal.  Patient runs the risk of developing a deeper sore and infection.  Advised it extremely important to find something soft dry comfortable between the toes.  Pointed out same condition starting 2nd digit right foot  Ulcer debrided 2nd digit left foot, area cleansed, wound starting 2nd digit right foot was cleansed and gentian violet applied to both areas with a light gauze and Coban dressings.  Encouraged patient to leave this on until in morning, can remove it, does not need to be covered would strongly encouraged to use something soft, gauze between these toes daily and avoid application of lotion  Sample gentian violet dispensed to daughter, apply once a week  Call immediately with any changes or concerns  Multiple calluses debrided posterior heel bilateral, IPK right heel, 5th digit right. No discoloration or areas of potential complications  Triple antibiotic ointment fabric bandage applied bottom right heel to soften, remove tomorrow, can apply lotion to this area daily   Reviewed better maintenance of nails. Nails debrided in thickness reduced, reviewed better maintenance of nails, tea tree oil, soaking  Reviewed bunions pressure on second digits, appropriate shoes  Check feet daily, contact office with any area of concern which has not improved in a few days  Patient/family were in understanding and  agreement with treatment plan.  I counseled the patient on their conditions, implications and medical management.  Instructed patient/family to contact the office with any changes, questions, concerns, worsening of symptoms.   Total face-to-face time, exam, assessment, treatment, discussion, documentation 30 minutes, more than half this time spent on consultation and coordination of care.  Follow up prn 3 months      This note was created using M*[employer] voice recognition software that occasionally misinterpreted phrases or words.

## 2022-07-28 ENCOUNTER — OFFICE VISIT (OUTPATIENT)
Dept: PODIATRY | Facility: CLINIC | Age: 87
End: 2022-07-28
Payer: MEDICARE

## 2022-07-28 VITALS
WEIGHT: 121 LBS | HEIGHT: 64 IN | BODY MASS INDEX: 20.66 KG/M2 | OXYGEN SATURATION: 97 % | SYSTOLIC BLOOD PRESSURE: 111 MMHG | HEART RATE: 87 BPM | DIASTOLIC BLOOD PRESSURE: 68 MMHG

## 2022-07-28 DIAGNOSIS — B35.1 ONYCHOMYCOSIS OF TOENAIL: ICD-10-CM

## 2022-07-28 DIAGNOSIS — M20.12 HALLUX ABDUCTO VALGUS, BILATERAL: ICD-10-CM

## 2022-07-28 DIAGNOSIS — R60.0 EDEMA, LOWER EXTREMITY: ICD-10-CM

## 2022-07-28 DIAGNOSIS — M79.89 PAIN AND SWELLING OF TOE OF LEFT FOOT: ICD-10-CM

## 2022-07-28 DIAGNOSIS — M79.675 PAIN AND SWELLING OF TOE OF LEFT FOOT: ICD-10-CM

## 2022-07-28 DIAGNOSIS — L84 FOOT CALLUS: ICD-10-CM

## 2022-07-28 DIAGNOSIS — L97.521 ULCER OF LEFT SECOND TOE, LIMITED TO BREAKDOWN OF SKIN: Primary | ICD-10-CM

## 2022-07-28 DIAGNOSIS — M20.11 HALLUX ABDUCTO VALGUS, BILATERAL: ICD-10-CM

## 2022-07-28 DIAGNOSIS — M20.42 HAMMERTOE OF SECOND TOE OF LEFT FOOT: ICD-10-CM

## 2022-07-28 PROCEDURE — 99999 PR PBB SHADOW E&M-EST. PATIENT-LVL IV: ICD-10-PCS | Mod: PBBFAC,,, | Performed by: PODIATRIST

## 2022-07-28 PROCEDURE — 99214 PR OFFICE/OUTPT VISIT, EST, LEVL IV, 30-39 MIN: ICD-10-PCS | Mod: S$PBB,,, | Performed by: PODIATRIST

## 2022-07-28 PROCEDURE — 99214 OFFICE O/P EST MOD 30 MIN: CPT | Mod: PBBFAC | Performed by: PODIATRIST

## 2022-07-28 PROCEDURE — 99214 OFFICE O/P EST MOD 30 MIN: CPT | Mod: S$PBB,,, | Performed by: PODIATRIST

## 2022-07-28 PROCEDURE — 99999 PR PBB SHADOW E&M-EST. PATIENT-LVL IV: CPT | Mod: PBBFAC,,, | Performed by: PODIATRIST

## 2022-08-01 NOTE — PROGRESS NOTES
Subjective:       Patient ID: Re Arciniega is a 89 y.o. female.    Chief Complaint: Follow-up and Wound Check (Toe pain/)  Patient presents with her daughter for follow-up pain and swelling toe, HAV, edema, hammertoe, with ulcer 2nd digit left foot, starting mildly 2nd digt right foot. Patient has been applying gentian violet to both toes herself and applying gauze between the toes daily. Not as painful unless touched.         Past Medical History:   Diagnosis Date    Anemia     Constipation     Hypertension     Thyroid disease      Past Surgical History:   Procedure Laterality Date    CHOLECYSTECTOMY      HYSTERECTOMY       History reviewed. No pertinent family history.  Social History     Socioeconomic History    Marital status:    Tobacco Use    Smoking status: Never Smoker    Smokeless tobacco: Never Used   Substance and Sexual Activity    Alcohol use: No    Drug use: No    Sexual activity: Never       Current Outpatient Medications   Medication Sig Dispense Refill    cyanocobalamin 1,000 mcg/mL injection       docusate sodium (COLACE) 50 MG capsule Take by mouth 2 (two) times daily.      erythromycin (ROMYCIN) ophthalmic ointment       EUTHYROX 100 mcg tablet       ferrous sulfate (FEOSOL) 325 mg (65 mg iron) Tab tablet ferrous sulfate 325 mg (65 mg iron) tablet      hydroCHLOROthiazide (MICROZIDE) 12.5 mg capsule Take 12.5 mg by mouth every morning.      iron-vitamin C 100-250 mg, ICAR-C, 100-250 mg Tab       levothyroxine (SYNTHROID) 88 MCG tablet       losartan (COZAAR) 100 MG tablet Take 100 mg by mouth once daily.      telmisartan (MICARDIS) 80 MG Tab        No current facility-administered medications for this visit.     Review of patient's allergies indicates:  No Known Allergies    Review of Systems   HENT: Negative for congestion.    Respiratory: Negative for cough and shortness of breath.    Cardiovascular: Positive for leg swelling.   Musculoskeletal: Positive for gait  "problem.        Walker   All other systems reviewed and are negative.      Objective:      Vitals:    07/28/22 1351   BP: 111/68   Pulse: 87   SpO2: 97%   Weight: 54.9 kg (121 lb)   Height: 5' 4" (1.626 m)     Physical Exam  Vitals and nursing note reviewed. Exam conducted with a chaperone present.   Constitutional:       General: She is not in acute distress.  Cardiovascular:      Pulses:           Dorsalis pedis pulses are 1+ on the right side and 1+ on the left side.        Posterior tibial pulses are 1+ on the right side and 1+ on the left side.   Pulmonary:      Effort: Pulmonary effort is normal.   Musculoskeletal:         General: Swelling, tenderness and deformity present.      Right lower leg: Edema present.      Left lower leg: Edema present.      Right foot: Decreased range of motion. Deformity (hammertoe 2nd b/l) and bunion present.      Left foot: Decreased range of motion. Deformity and bunion present.      Comments: Pain 2nd digit left    Feet:      Right foot:      Skin integrity: Ulcer (pre ulcerative callu medial 2nd digit right, smooth, no ulcer, stable with heavy gentian violet, at risk ) and callus (chronic discolored callus posterior lateral heels b/l) present. No skin breakdown.      Toenail Condition: Right toenails are long. Fungal disease present.     Left foot:      Skin integrity: Ulcer (pre ulcerative callus with edema, heavy gentian violet medial 2nd digit left foot, center ulcer remains closed, at high risk  ) and callus present. No skin breakdown.      Toenail Condition: Left toenails are long. Fungal disease present.  Skin:     Capillary Refill: Capillary refill takes 2 to 3 seconds.   Neurological:      General: No focal deficit present.   Psychiatric:         Mood and Affect: Mood normal.         Behavior: Behavior normal.                                                Assessment:       1. Ulcer of left second toe, limited to breakdown of skin    2. Hammertoe of second toe of left " foot    3. Pain and swelling of toe of left foot - Left Foot    4. Hallux abducto valgus, bilateral    5. Edema, lower extremity    6. Foot callus    7. Onychomycosis of toenail        Plan:           Reviewed hammertoes, bunions, constant pressure in this area and potential complications  Reviewed swelling, elevation, potential complications    Pre ulcerative callus debrided 2nd digit left foot, no opening, stable, improved, area cleansed, folded 2x2 gauze applied  Pre ulcerative callus debrided 2nd digit right foot, no opening, much improved, stable, area cleansed, folded 2x2 gauze applied  Advised patient and daughter areas are stable, improved, right especially, however gentian violet can be applied every 2nd or 3rd day and would be more effective if applied to only callused/sore area if possible  Showed patient how to apply thicker padding between the toes and encouraged to use more soft padding if comfortable  Multiple calluses debrided posterior heel bilateral. No discoloration or areas of potential complications  Reviewed better maintenance of nails. Nails debrided, thickness reduced, reviewed better maintenance of nails  Reviewed soaking in warm water and Epson salt few times a week which can be beneficial for the swelling and pain of the 2nd digit left foot, however family member would need to help her with this and the areas on the sides of both 2nd toes would need to be aired out, uncovered and skin thoroughly dry following soaking  Check feet daily, contact office with any area of concern which has not improved in a few days  Patient/family were in understanding and agreement with treatment plan.  I counseled the patient on their conditions, implications and medical management.  Instructed patient/family to contact the office with any changes, questions, concerns, worsening of symptoms.   Total face-to-face time, exam, assessment, treatment, discussion, documentation 30 minutes, more than half this time  spent on consultation and coordination of care.  Follow up prn 3 months      This note was created using yetu voice recognition software that occasionally misinterpreted phrases or words.

## 2022-10-27 ENCOUNTER — OFFICE VISIT (OUTPATIENT)
Dept: PODIATRY | Facility: CLINIC | Age: 87
End: 2022-10-27
Payer: MEDICARE

## 2022-10-27 VITALS
HEIGHT: 64 IN | HEART RATE: 75 BPM | BODY MASS INDEX: 20.35 KG/M2 | WEIGHT: 119.19 LBS | DIASTOLIC BLOOD PRESSURE: 78 MMHG | RESPIRATION RATE: 16 BRPM | SYSTOLIC BLOOD PRESSURE: 154 MMHG

## 2022-10-27 DIAGNOSIS — M20.11 HALLUX ABDUCTO VALGUS, BILATERAL: ICD-10-CM

## 2022-10-27 DIAGNOSIS — M79.89 PAIN AND SWELLING OF TOE OF LEFT FOOT: ICD-10-CM

## 2022-10-27 DIAGNOSIS — M79.675 PAIN AND SWELLING OF TOE OF LEFT FOOT: ICD-10-CM

## 2022-10-27 DIAGNOSIS — R60.0 EDEMA OF BOTH LOWER EXTREMITIES: ICD-10-CM

## 2022-10-27 DIAGNOSIS — B35.1 ONYCHOMYCOSIS OF TOENAIL: ICD-10-CM

## 2022-10-27 DIAGNOSIS — L84 FOOT CALLUS: ICD-10-CM

## 2022-10-27 DIAGNOSIS — L84 PRE-ULCERATIVE CALLUSES: ICD-10-CM

## 2022-10-27 DIAGNOSIS — M20.42 HAMMERTOE OF SECOND TOE OF LEFT FOOT: Primary | ICD-10-CM

## 2022-10-27 DIAGNOSIS — M20.12 HALLUX ABDUCTO VALGUS, BILATERAL: ICD-10-CM

## 2022-10-27 PROCEDURE — 99214 PR OFFICE/OUTPT VISIT, EST, LEVL IV, 30-39 MIN: ICD-10-PCS | Mod: S$PBB,,, | Performed by: PODIATRIST

## 2022-10-27 PROCEDURE — 99214 OFFICE O/P EST MOD 30 MIN: CPT | Mod: PBBFAC | Performed by: PODIATRIST

## 2022-10-27 PROCEDURE — 99999 PR PBB SHADOW E&M-EST. PATIENT-LVL IV: ICD-10-PCS | Mod: PBBFAC,,, | Performed by: PODIATRIST

## 2022-10-27 PROCEDURE — 99214 OFFICE O/P EST MOD 30 MIN: CPT | Mod: S$PBB,,, | Performed by: PODIATRIST

## 2022-10-27 PROCEDURE — 99999 PR PBB SHADOW E&M-EST. PATIENT-LVL IV: CPT | Mod: PBBFAC,,, | Performed by: PODIATRIST

## 2022-10-30 NOTE — PROGRESS NOTES
Subjective:       Patient ID: Re Arciniega is a 89 y.o. female.    Chief Complaint: Follow-up, Hammer Toe, Foot Swelling, Foot Pain, Callouses, and Foot Ulcer  Patient presents with her daughter for follow-up pain and swelling toe, HAV, edema, hammertoe, with ulcer 2nd digit right foot.  Patient relates pain 2nd digit left foot has improved significantly.  She has been using a soft make up pad between the toes which shows been fairly comfortable.  Is applying gentian violet to a few areas of pain, corners of big toenails, 5th digit right foot most painful today.        Past Medical History:   Diagnosis Date    Anemia     Constipation     Hypertension     Thyroid disease      Past Surgical History:   Procedure Laterality Date    CHOLECYSTECTOMY      HYSTERECTOMY       History reviewed. No pertinent family history.  Social History     Socioeconomic History    Marital status:    Tobacco Use    Smoking status: Never    Smokeless tobacco: Never   Substance and Sexual Activity    Alcohol use: No    Drug use: No    Sexual activity: Never       Current Outpatient Medications   Medication Sig Dispense Refill    cyanocobalamin 1,000 mcg/mL injection       docusate sodium (COLACE) 50 MG capsule Take by mouth 2 (two) times daily.      ferrous sulfate (FEOSOL) 325 mg (65 mg iron) Tab tablet ferrous sulfate 325 mg (65 mg iron) tablet      hydroCHLOROthiazide (MICROZIDE) 12.5 mg capsule Take 12.5 mg by mouth every morning.      iron-vitamin C 100-250 mg, ICAR-C, 100-250 mg Tab       levothyroxine (SYNTHROID) 88 MCG tablet       losartan (COZAAR) 100 MG tablet Take 100 mg by mouth once daily.      telmisartan (MICARDIS) 80 MG Tab       erythromycin (ROMYCIN) ophthalmic ointment        No current facility-administered medications for this visit.     Review of patient's allergies indicates:  No Known Allergies    Review of Systems   HENT:  Negative for congestion.    Respiratory:  Negative for cough and shortness of breath.   "  Cardiovascular:  Positive for leg swelling.   Musculoskeletal:  Positive for gait problem.        Walker   All other systems reviewed and are negative.    Objective:      Vitals:    10/27/22 1442   BP: (!) 154/78   Pulse: 75   Resp: 16   Weight: 54.1 kg (119 lb 3.2 oz)   Height: 5' 4" (1.626 m)     Physical Exam  Vitals and nursing note reviewed. Exam conducted with a chaperone present.   Constitutional:       General: She is not in acute distress.  Cardiovascular:      Pulses:           Dorsalis pedis pulses are 1+ on the right side and 1+ on the left side.        Posterior tibial pulses are 1+ on the right side and 1+ on the left side.   Pulmonary:      Effort: Pulmonary effort is normal.   Musculoskeletal:         General: Swelling, tenderness and deformity present.      Right lower leg: Edema present.      Left lower leg: Edema present.      Right foot: Decreased range of motion. Deformity (hammertoe 2nd b/l) and bunion present.      Left foot: Decreased range of motion. Deformity and bunion present.      Comments: Significant decrease in pain 2nd digit left, pain 5th digit right foot/painful lesion/callus    Feet:      Right foot:      Skin integrity: Erythema (medial 2nd digit right due to rubbing, no callus, no pain) and callus (Painful small well-defined raised callus distal lateral 5th digit right, chronic callus posterior right heel stable) present. No skin breakdown.      Toenail Condition: Right toenails are abnormally thick and long. Fungal disease present.     Left foot:      Skin integrity: Callus (Preulcerative callus medial 2nd digit left much improved, significant decrease in pain.  Callus plantar and posterior left heel stable) present. No skin breakdown.      Toenail Condition: Left toenails are abnormally thick and long. Fungal disease present.  Skin:     Capillary Refill: Capillary refill takes 2 to 3 seconds.   Neurological:      General: No focal deficit present.   Psychiatric:         Mood " and Affect: Mood normal.         Behavior: Behavior normal.                                          Assessment:       1. Hammertoe of second toe of left foot    2. Pain and swelling of toe of left foot - Left Foot    3. Hallux abducto valgus, bilateral    4. Pre-ulcerative calluses    5. Edema of both lower extremities    6. Foot callus    7. Onychomycosis of toenail        Plan:           Reviewed hammertoes, bunions, contracted 5th digit right, pressure on heels due to swelling.  Reviewed swelling, skin care, potential complications    Pre ulcerative callus debrided 2nd digit left foot with significant improvement, this area was debrided thoroughly since patient was having little to no pain of this toe today.  No skin opening, no infection, much improved in stable at this time  Pre ulcerative area 2nd digit right foot with some redness, depression due to pressure of the great toe, no callus, no skin break, no pain but advised patient gentian violet needs to be applied in this area and highly recommend she continue light comfortable padding between both 1st and 2nd digits on a daily basis  Where only wide shoes to accommodate these areas and we discussed contracted 5th digit right with callus  Multiple pre ulcerative calluses and other calluses debrided posterior heel bilateral, 5th digit right, 2nd digit left , plantar left heel  Reviewed care of nails. Nails debrided, thickness reduced  Reviewed soaking in warm water and Epson salt few times a week  Check feet daily, contact office with any area of concern which has not improved in a few days  Patient/family were in understanding and agreement with treatment plan.  I counseled the patient on their conditions, implications and medical management.  Instructed patient/family to contact the office with any changes, questions, concerns, worsening of symptoms.   Total face-to-face time, exam, assessment, treatment, discussion, documentation 30 minutes, more than half  this time spent on consultation and coordination of care.  Follow up prn 3 months      This note was created using [employer] voice recognition software that occasionally misinterpreted phrases or words.

## 2022-12-21 ENCOUNTER — HOSPITAL ENCOUNTER (EMERGENCY)
Facility: HOSPITAL | Age: 87
Discharge: HOME | End: 2022-12-21
Payer: MEDICARE

## 2022-12-21 VITALS
OXYGEN SATURATION: 99 % | HEIGHT: 63 IN | HEART RATE: 68 BPM | DIASTOLIC BLOOD PRESSURE: 63 MMHG | TEMPERATURE: 99 F | BODY MASS INDEX: 20.73 KG/M2 | RESPIRATION RATE: 18 BRPM | WEIGHT: 117 LBS | SYSTOLIC BLOOD PRESSURE: 124 MMHG

## 2022-12-21 DIAGNOSIS — N39.0 URINARY TRACT INFECTION WITHOUT HEMATURIA, SITE UNSPECIFIED: Primary | ICD-10-CM

## 2022-12-21 DIAGNOSIS — R10.9 FLANK PAIN: ICD-10-CM

## 2022-12-21 DIAGNOSIS — K59.00 CONSTIPATION, UNSPECIFIED CONSTIPATION TYPE: ICD-10-CM

## 2022-12-21 LAB
ALBUMIN SERPL BCP-MCNC: 3.8 G/DL (ref 3.5–5.2)
ALP SERPL-CCNC: 58 U/L (ref 55–135)
ALT SERPL W/O P-5'-P-CCNC: 10 U/L (ref 10–44)
ANION GAP SERPL CALC-SCNC: 12 MMOL/L (ref 8–16)
AST SERPL-CCNC: 17 U/L (ref 10–40)
BACTERIA #/AREA URNS HPF: ABNORMAL /HPF
BASOPHILS # BLD AUTO: 0.02 K/UL (ref 0–0.2)
BASOPHILS NFR BLD: 0.5 % (ref 0–1.9)
BILIRUB SERPL-MCNC: 0.5 MG/DL (ref 0.1–1)
BILIRUB UR QL STRIP: NEGATIVE
BUN SERPL-MCNC: 25 MG/DL (ref 10–30)
CALCIUM SERPL-MCNC: 9.1 MG/DL (ref 8.7–10.5)
CHLORIDE SERPL-SCNC: 105 MMOL/L (ref 95–110)
CLARITY UR: CLEAR
CO2 SERPL-SCNC: 25 MMOL/L (ref 23–29)
COLOR UR: YELLOW
CREAT SERPL-MCNC: 1 MG/DL (ref 0.5–1.4)
DIFFERENTIAL METHOD: ABNORMAL
EOSINOPHIL # BLD AUTO: 0.2 K/UL (ref 0–0.5)
EOSINOPHIL NFR BLD: 3.7 % (ref 0–8)
ERYTHROCYTE [DISTWIDTH] IN BLOOD BY AUTOMATED COUNT: 13.2 % (ref 11.5–14.5)
EST. GFR  (NO RACE VARIABLE): 52.9 ML/MIN/1.73 M^2
GLUCOSE SERPL-MCNC: 75 MG/DL (ref 70–110)
GLUCOSE UR QL STRIP: NEGATIVE
HCT VFR BLD AUTO: 31.7 % (ref 37–48.5)
HGB BLD-MCNC: 10.4 G/DL (ref 12–16)
HGB UR QL STRIP: NEGATIVE
IMM GRANULOCYTES # BLD AUTO: 0.01 K/UL (ref 0–0.04)
IMM GRANULOCYTES NFR BLD AUTO: 0.2 % (ref 0–0.5)
KETONES UR QL STRIP: NEGATIVE
LEUKOCYTE ESTERASE UR QL STRIP: ABNORMAL
LYMPHOCYTES # BLD AUTO: 0.9 K/UL (ref 1–4.8)
LYMPHOCYTES NFR BLD: 22.7 % (ref 18–48)
MCH RBC QN AUTO: 31.9 PG (ref 27–31)
MCHC RBC AUTO-ENTMCNC: 32.8 G/DL (ref 32–36)
MCV RBC AUTO: 97 FL (ref 82–98)
MICROSCOPIC COMMENT: ABNORMAL
MONOCYTES # BLD AUTO: 0.4 K/UL (ref 0.3–1)
MONOCYTES NFR BLD: 10 % (ref 4–15)
NEUTROPHILS # BLD AUTO: 2.6 K/UL (ref 1.8–7.7)
NEUTROPHILS NFR BLD: 62.9 % (ref 38–73)
NITRITE UR QL STRIP: NEGATIVE
NRBC BLD-RTO: 0 /100 WBC
OB PNL STL: NEGATIVE
PH UR STRIP: 5 [PH] (ref 5–8)
PLATELET # BLD AUTO: 161 K/UL (ref 150–450)
PMV BLD AUTO: 10.5 FL (ref 9.2–12.9)
POTASSIUM SERPL-SCNC: 4 MMOL/L (ref 3.5–5.1)
PROT SERPL-MCNC: 6.8 G/DL (ref 6–8.4)
PROT UR QL STRIP: NEGATIVE
RBC # BLD AUTO: 3.26 M/UL (ref 4–5.4)
SODIUM SERPL-SCNC: 142 MMOL/L (ref 136–145)
SP GR UR STRIP: 1.02 (ref 1–1.03)
SQUAMOUS #/AREA URNS HPF: ABNORMAL /HPF
URN SPEC COLLECT METH UR: ABNORMAL
UROBILINOGEN UR STRIP-ACNC: NEGATIVE EU/DL
WBC # BLD AUTO: 4.09 K/UL (ref 3.9–12.7)
WBC #/AREA URNS HPF: 20 /HPF (ref 0–5)

## 2022-12-21 PROCEDURE — 81000 URINALYSIS NONAUTO W/SCOPE: CPT | Performed by: NURSE PRACTITIONER

## 2022-12-21 PROCEDURE — 63600175 PHARM REV CODE 636 W HCPCS: Performed by: NURSE PRACTITIONER

## 2022-12-21 PROCEDURE — 82272 OCCULT BLD FECES 1-3 TESTS: CPT | Performed by: NURSE PRACTITIONER

## 2022-12-21 PROCEDURE — 80053 COMPREHEN METABOLIC PANEL: CPT | Performed by: NURSE PRACTITIONER

## 2022-12-21 PROCEDURE — 74018 RADEX ABDOMEN 1 VIEW: CPT | Mod: TC

## 2022-12-21 PROCEDURE — 96365 THER/PROPH/DIAG IV INF INIT: CPT

## 2022-12-21 PROCEDURE — 99284 EMERGENCY DEPT VISIT MOD MDM: CPT | Mod: 25

## 2022-12-21 PROCEDURE — 85025 COMPLETE CBC W/AUTO DIFF WBC: CPT | Performed by: NURSE PRACTITIONER

## 2022-12-21 PROCEDURE — 74018 XR ABDOMEN AP 1 VIEW: ICD-10-PCS | Mod: 26,,, | Performed by: RADIOLOGY

## 2022-12-21 PROCEDURE — 87086 URINE CULTURE/COLONY COUNT: CPT | Performed by: NURSE PRACTITIONER

## 2022-12-21 PROCEDURE — 74018 RADEX ABDOMEN 1 VIEW: CPT | Mod: 26,,, | Performed by: RADIOLOGY

## 2022-12-21 RX ORDER — POLYETHYLENE GLYCOL 3350 17 G/17G
17 POWDER, FOR SOLUTION ORAL DAILY
Qty: 30 PACKET | Refills: 0 | Status: SHIPPED | OUTPATIENT
Start: 2022-12-21

## 2022-12-21 RX ORDER — CEFDINIR 300 MG/1
300 CAPSULE ORAL 2 TIMES DAILY
Qty: 14 CAPSULE | Refills: 0 | Status: SHIPPED | OUTPATIENT
Start: 2022-12-21 | End: 2022-12-28

## 2022-12-21 RX ADMIN — CEFTRIAXONE 1 G: 1 INJECTION, SOLUTION INTRAVENOUS at 05:12

## 2022-12-21 NOTE — DISCHARGE INSTRUCTIONS
Take medication as prescribed, be sure to finish antibiotics.    Take MiraLax for next couple days, soon as have good bowel movement stop taking the MiraLax continue taking your stool softeners.    Follow-up primary care provider after finishing antibiotics to be sure bacteria is cleared from urine.    Return emergency room if symptoms worsen, you develop any new other worrisome symptom.

## 2022-12-21 NOTE — ED NOTES
In to see pt at this time. Pt aaox4. Pt states she has been having back pain for a few days with little to no relief, pt states it is mainly hurting all on the left side of her back states she doesn't think she did anything to hurt her back and that originally she thought it was gas pain but it has gotten worse. Pt states her pain is a 7/10 in her back. Pt has no other needs or complaints at this time.

## 2022-12-21 NOTE — ED PROVIDER NOTES
Encounter Date: 12/21/2022       History     Chief Complaint   Patient presents with    Back Pain     Patient is a 89-year-old female presents emergency room with family members complaining of left-sided back pain.  Patient states pain started 2-3 days ago has progressively gotten worse.  Patient denies any traumatic events.  Patient states he has noticed a odor to her urine over the past couple days.  Patient states she has had 2 bowel movements this morning, as noted them to be darker than normal.  Patient does take iron on a daily basis.  Patient states when her back pain started she thought was possibly gas, take Gas-X medication without any relief.  Patient denies having any fever, chills, nausea, vomiting, diarrhea, chest pain and shortness of breath.  No known sick contacts.    Review of patient's allergies indicates:  No Known Allergies  Past Medical History:   Diagnosis Date    Anemia     Constipation     Hypertension     Thyroid disease      Past Surgical History:   Procedure Laterality Date    CHOLECYSTECTOMY      HYSTERECTOMY       History reviewed. No pertinent family history.  Social History     Tobacco Use    Smoking status: Never    Smokeless tobacco: Never   Substance Use Topics    Alcohol use: No    Drug use: No     Review of Systems   Constitutional: Negative.    HENT: Negative.     Eyes: Negative.    Respiratory: Negative.     Cardiovascular: Negative.    Gastrointestinal: Negative.    Endocrine: Negative.    Genitourinary:  Positive for decreased urine volume and flank pain. Negative for difficulty urinating, dysuria, hematuria and urgency.   Musculoskeletal:  Positive for back pain.   Skin: Negative.    Allergic/Immunologic: Negative for food allergies.   Neurological: Negative.    Psychiatric/Behavioral: Negative.     All other systems reviewed and are negative.    Physical Exam     Initial Vitals [12/21/22 1509]   BP Pulse Resp Temp SpO2   (!) 152/71 71 19 98.9 °F (37.2 °C) 97 %      MAP        --         Physical Exam    Nursing note and vitals reviewed.  Constitutional: She appears well-developed and well-nourished. She is not diaphoretic. No distress.   HENT:   Head: Normocephalic and atraumatic.   Mouth/Throat: Oropharynx is clear and moist.   Eyes: Conjunctivae and EOM are normal. Pupils are equal, round, and reactive to light. No scleral icterus.   Neck:   Normal range of motion.  Cardiovascular:  Normal rate, regular rhythm, normal heart sounds and intact distal pulses.           No murmur heard.  Pulmonary/Chest: Breath sounds normal. No respiratory distress. She has no wheezes. She has no rhonchi. She has no rales.   Abdominal: Abdomen is soft. Bowel sounds are normal. She exhibits no distension. There is no abdominal tenderness. There is no rebound and no guarding.   Musculoskeletal:         General: Edema (1+ pitting lower extremity edema bilateral) present. No tenderness. Normal range of motion.      Cervical back: Normal range of motion.     Lymphadenopathy:     She has no cervical adenopathy.   Neurological: She is alert and oriented to person, place, and time. She has normal strength. No sensory deficit. GCS score is 15. GCS eye subscore is 4. GCS verbal subscore is 5. GCS motor subscore is 6.   Skin: Skin is warm and dry. Capillary refill takes 2 to 3 seconds.   Psychiatric: She has a normal mood and affect.       ED Course   Procedures  Labs Reviewed   URINALYSIS, REFLEX TO URINE CULTURE - Abnormal; Notable for the following components:       Result Value    Leukocytes, UA 1+ (*)     All other components within normal limits    Narrative:     Preferred Collection Type->Urine, Clean Catch  Specimen Source->Urine   COMPREHENSIVE METABOLIC PANEL - Abnormal; Notable for the following components:    eGFR 52.9 (*)     All other components within normal limits   CBC W/ AUTO DIFFERENTIAL - Abnormal; Notable for the following components:    RBC 3.26 (*)     Hemoglobin 10.4 (*)     Hematocrit 31.7  (*)     MCH 31.9 (*)     Lymph # 0.9 (*)     All other components within normal limits   URINALYSIS MICROSCOPIC - Abnormal; Notable for the following components:    WBC, UA 20 (*)     Bacteria Few (*)     All other components within normal limits    Narrative:     Preferred Collection Type->Urine, Clean Catch  Specimen Source->Urine   CULTURE, URINE   OCCULT BLOOD X 1, STOOL          Imaging Results              X-Ray Abdomen AP 1 View (KUB) (In process)                   X-Rays:   Independently Interpreted Readings:   Other Readings:  KUB     Patient appears to have some retained stool in the colon as well as nonspecific gas pattern.  Medications   cefTRIAXone (ROCEPHIN) 1 g/50 mL D5W IVPB (has no administration in time range)     Medical Decision Making:   Initial Assessment:   Patient seen examined emergency room.  Patient appears to be in no acute distress this time.  Assessment as noted above.  Differential Diagnosis:   GI bleed, UTI, kidney infection, muscle spasms, diverticulitis, diverticulosis, AAA  Clinical Tests:   Lab Tests: Ordered and Reviewed  The following lab test(s) were unremarkable: CBC and CMP       <> Summary of Lab: Urinalysis mostly normal, 1+ leukocytes, 20 WBC count with few bacteria noted.  Otherwise unremarkable  Radiological Study: Ordered and Reviewed  ED Management:  After patient seen examined emergency room, will check a UA and KUB, occult stool, CMP, CBC.    After labs reviewed and KUB reviewed, spleen findings with the patient, will treat patient with 1 g IV Rocephin.  Follow-up with [facility] for UTI.  Patient also encouraged to take MiraLax the next couple days to aid with cleaning her colon.  Encouraged patient and she does not have any improvement in 3-5 days follow-up primary care provider return emergency room.                        Clinical Impression:   Final diagnoses:  [R10.9] Flank pain  [N39.0] Urinary tract infection without hematuria, site unspecified  (Primary)  [K59.00] Constipation, unspecified constipation type        ED Disposition Condition    Discharge Stable          ED Prescriptions       Medication Sig Dispense Start Date End Date Auth. Provider    cefdinir (OMNICEF) 300 MG capsule Take 1 capsule (300 mg total) by mouth 2 (two) times daily. for 7 days 14 capsule 12/21/2022 12/28/2022 Cuauhtemoc Gonzalez NP    polyethylene glycol (GLYCOLAX) 17 gram PwPk Take 17 g by mouth once daily. 30 packet 12/21/2022 -- Cuauhtemoc Gonzalez NP          Follow-up Information       Follow up With Specialties Details Why Contact Info    Anthony Emanuel MD Family Medicine In 3 days If symptoms worsen, As needed [address]  [facility] [address] [phone]               Cuauhtemoc Gonzalez NP  12/21/22 4367

## 2022-12-23 LAB
BACTERIA UR CULT: NORMAL
BACTERIA UR CULT: NORMAL

## 2023-01-12 ENCOUNTER — OFFICE VISIT (OUTPATIENT)
Dept: PODIATRY | Facility: CLINIC | Age: 88
End: 2023-01-12
Payer: MEDICARE

## 2023-01-12 VITALS
HEART RATE: 69 BPM | SYSTOLIC BLOOD PRESSURE: 131 MMHG | RESPIRATION RATE: 16 BRPM | DIASTOLIC BLOOD PRESSURE: 76 MMHG | HEIGHT: 63 IN | BODY MASS INDEX: 20.73 KG/M2 | WEIGHT: 117 LBS

## 2023-01-12 DIAGNOSIS — B35.1 ONYCHOMYCOSIS OF TOENAIL: ICD-10-CM

## 2023-01-12 DIAGNOSIS — M20.42 HAMMER TOES OF BOTH FEET: ICD-10-CM

## 2023-01-12 DIAGNOSIS — M20.12 HALLUX ABDUCTO VALGUS, BILATERAL: ICD-10-CM

## 2023-01-12 DIAGNOSIS — L97.511 SKIN ULCER OF SECOND TOE OF RIGHT FOOT, LIMITED TO BREAKDOWN OF SKIN: ICD-10-CM

## 2023-01-12 DIAGNOSIS — L97.521 ULCER OF LEFT SECOND TOE, LIMITED TO BREAKDOWN OF SKIN: Primary | ICD-10-CM

## 2023-01-12 DIAGNOSIS — M20.11 HALLUX ABDUCTO VALGUS, BILATERAL: ICD-10-CM

## 2023-01-12 DIAGNOSIS — L84 PRE-ULCERATIVE CALLUSES: ICD-10-CM

## 2023-01-12 DIAGNOSIS — R60.0 EDEMA OF BOTH LOWER EXTREMITIES: ICD-10-CM

## 2023-01-12 DIAGNOSIS — M20.41 HAMMER TOES OF BOTH FEET: ICD-10-CM

## 2023-01-12 PROCEDURE — 99214 PR OFFICE/OUTPT VISIT, EST, LEVL IV, 30-39 MIN: ICD-10-PCS | Mod: S$PBB,,, | Performed by: PODIATRIST

## 2023-01-12 PROCEDURE — 99214 OFFICE O/P EST MOD 30 MIN: CPT | Mod: S$PBB,,, | Performed by: PODIATRIST

## 2023-01-12 PROCEDURE — 99999 PR PBB SHADOW E&M-EST. PATIENT-LVL IV: CPT | Mod: PBBFAC,,, | Performed by: PODIATRIST

## 2023-01-12 PROCEDURE — 99214 OFFICE O/P EST MOD 30 MIN: CPT | Mod: PBBFAC | Performed by: PODIATRIST

## 2023-01-12 PROCEDURE — 99999 PR PBB SHADOW E&M-EST. PATIENT-LVL IV: ICD-10-PCS | Mod: PBBFAC,,, | Performed by: PODIATRIST

## 2023-01-12 RX ORDER — HYDROCHLOROTHIAZIDE 12.5 MG/1
12.5 TABLET ORAL
COMMUNITY
Start: 2022-11-29 | End: 2023-01-16 | Stop reason: SDUPTHER

## 2023-01-16 NOTE — PROGRESS NOTES
Subjective:       Patient ID: Re Arciniega is a 89 y.o. female.    Chief Complaint: Follow-up, Foot Pain, Foot Swelling, Callouses, and Wound Check  Patient presents with her daughter for follow-up pain and swelling toe, HAV, edema, hammertoe, ulcer 2nd digit right foot.  Patient relates 2nd digit left foot most painful.  Has been applying moisturizer to her feet and applying socks, has not been using the gentian violet on callus/corns on the 2nd digits.  Pain level 2nd digit left foot 9/10.    Past Medical History:   Diagnosis Date    Anemia     Constipation     Hypertension     Thyroid disease      Past Surgical History:   Procedure Laterality Date    CHOLECYSTECTOMY      HYSTERECTOMY       History reviewed. No pertinent family history.  Social History     Socioeconomic History    Marital status:    Tobacco Use    Smoking status: Never    Smokeless tobacco: Never   Substance and Sexual Activity    Alcohol use: No    Drug use: No    Sexual activity: Never       Current Outpatient Medications   Medication Sig Dispense Refill    cyanocobalamin 1,000 mcg/mL injection       docusate sodium (COLACE) 50 MG capsule Take by mouth 2 (two) times daily.      erythromycin (ROMYCIN) ophthalmic ointment       ferrous sulfate (FEOSOL) 325 mg (65 mg iron) Tab tablet ferrous sulfate 325 mg (65 mg iron) tablet      hydroCHLOROthiazide (MICROZIDE) 12.5 mg capsule Take 12.5 mg by mouth every morning.      iron-vitamin C 100-250 mg, ICAR-C, 100-250 mg Tab       levothyroxine (SYNTHROID) 88 MCG tablet       losartan (COZAAR) 100 MG tablet Take 100 mg by mouth once daily.      polyethylene glycol (GLYCOLAX) 17 gram PwPk Take 17 g by mouth once daily. 30 packet 0    telmisartan (MICARDIS) 80 MG Tab        No current facility-administered medications for this visit.     Review of patient's allergies indicates:  No Known Allergies    Review of Systems   HENT:  Negative for congestion.    Respiratory:  Negative for cough and shortness  "of breath.    Cardiovascular:  Positive for leg swelling.   Musculoskeletal:  Positive for gait problem.        Walker   All other systems reviewed and are negative.    Objective:      Vitals:    01/12/23 1431   BP: 131/76   Pulse: 69   Resp: 16   Weight: 53.1 kg (117 lb)   Height: 5' 3" (1.6 m)     Physical Exam  Vitals and nursing note reviewed. Exam conducted with a chaperone present.   Constitutional:       General: She is not in acute distress.  Cardiovascular:      Pulses:           Dorsalis pedis pulses are 1+ on the right side and 1+ on the left side.        Posterior tibial pulses are 1+ on the right side and 1+ on the left side.   Pulmonary:      Effort: Pulmonary effort is normal.   Musculoskeletal:         General: Swelling, tenderness and deformity present.      Right lower leg: Edema present.      Left lower leg: Edema present.      Right foot: Decreased range of motion. Deformity (hammertoe 2nd b/l) and bunion present.      Left foot: Decreased range of motion. Deformity and bunion present.      Comments: Significant decrease in pain 2nd digit left, pain 5th digit right foot/painful lesion/callus    Feet:      Right foot:      Skin integrity: Ulcer (Ulcer limited to breakdown of the skin same location medial 2nd digit right bilateral due to hammertoe, bunion and chronic friction in this location.  No infection at this time) and callus (Painful deep IPK sub 4th met head right foot and plantar right heel) present. No erythema.      Toenail Condition: Right toenails are abnormally thick. Fungal disease present.     Left foot:      Skin integrity: Ulcer and callus (Callus plantar and posterior left heel stable) present. No erythema.      Toenail Condition: Left toenails are abnormally thick. Fungal disease present.  Skin:     Capillary Refill: Capillary refill takes 2 to 3 seconds.   Neurological:      General: No focal deficit present.   Psychiatric:         Behavior: Behavior normal. "                                              Assessment:       1. Ulcer of left second toe, limited to breakdown of skin    2. Skin ulcer of second toe of right foot, limited to breakdown of skin    3. Hammer toes of both feet    4. Hallux abducto valgus, bilateral    5. Edema of both lower extremities    6. Pre-ulcerative calluses    7. Onychomycosis of toenail        Plan:           Pointed out to patient and daughter she has developed an open ulcer the inside both 2nd digits due to pressure from the hammertoe and bunion.  Advised these areas need to be kept clean and dry, no moisturizer around the toes/between the toes.  She can apply it to the rest of the foot.  Needs to resume gentian violet daily to both of these toes with gauze in the 1st webspace to separate these toes.  Patient confirms she does have gentian violet at home.  Daughter will check areas.  Patient is at high risk for infection  Reviewed hammertoes, bunions, contracted digits   Reviewed swelling, skin care, potential complications    Ulcer 2nd digit bilateral debrided, cleansed with wound , gentian violet and gauze applied in the 1st webspace bilateral.  Preulcerative calluses debrided sub 3rd met head right foot and plantar right heel along with the posterior aspect of both heels.  These 4 areas are stable without complication at this time  Patient is at high risk  Reviewed care and maintenance of pre ulcerative calluses  Reviewed care of nails. Nails debrided, thickness reduced  Reviewed soaking in warm water and Epson salt few times a week  Check feet daily, contact office with any area of concern which has not improved in a few days  Patient/family were in understanding and agreement with treatment plan.  I counseled the patient on their conditions, implications and medical management.  Instructed patient/family to contact the office with any changes, questions, concerns, worsening of symptoms.   Total face-to-face time, exam,  assessment, treatment, discussion, documentation 30 minutes, more than half this time spent on consultation and coordination of care.  Follow up prn 6 weeks      This note was created using [employer] voice recognition software that occasionally misinterpreted phrases or words.

## 2023-02-06 DIAGNOSIS — R13.10 DYSPHAGIA, UNSPECIFIED TYPE: Primary | ICD-10-CM

## 2023-02-10 ENCOUNTER — HOSPITAL ENCOUNTER (OUTPATIENT)
Dept: RADIOLOGY | Facility: HOSPITAL | Age: 88
Discharge: HOME | End: 2023-02-10
Attending: STUDENT IN AN ORGANIZED HEALTH CARE EDUCATION/TRAINING PROGRAM
Payer: MEDICARE

## 2023-02-10 DIAGNOSIS — R13.10 DYSPHAGIA, UNSPECIFIED TYPE: ICD-10-CM

## 2023-02-10 PROCEDURE — 74220 X-RAY XM ESOPHAGUS 1CNTRST: CPT | Mod: 26,,, | Performed by: RADIOLOGY

## 2023-02-10 PROCEDURE — 74220 X-RAY XM ESOPHAGUS 1CNTRST: CPT | Mod: TC

## 2023-02-10 PROCEDURE — 25500020 PHARM REV CODE 255: Performed by: STUDENT IN AN ORGANIZED HEALTH CARE EDUCATION/TRAINING PROGRAM

## 2023-02-10 PROCEDURE — A9698 NON-RAD CONTRAST MATERIALNOC: HCPCS | Performed by: STUDENT IN AN ORGANIZED HEALTH CARE EDUCATION/TRAINING PROGRAM

## 2023-02-10 PROCEDURE — 74220 FL ESOPHAGRAM COMPLETE: ICD-10-PCS | Mod: 26,,, | Performed by: RADIOLOGY

## 2023-02-10 RX ADMIN — BARIUM SULFATE 200 ML: 0.6 SUSPENSION ORAL at 10:02

## 2023-02-10 RX ADMIN — BARIUM SULFATE 135 ML: 980 POWDER, FOR SUSPENSION ORAL at 10:02

## 2023-02-15 ENCOUNTER — OFFICE VISIT (OUTPATIENT)
Dept: SURGERY | Facility: CLINIC | Age: 88
End: 2023-02-15
Payer: MEDICARE

## 2023-02-15 VITALS
HEART RATE: 78 BPM | WEIGHT: 116 LBS | BODY MASS INDEX: 19.81 KG/M2 | SYSTOLIC BLOOD PRESSURE: 132 MMHG | DIASTOLIC BLOOD PRESSURE: 80 MMHG | RESPIRATION RATE: 16 BRPM | OXYGEN SATURATION: 96 % | HEIGHT: 64 IN

## 2023-02-15 DIAGNOSIS — K22.0 ACHALASIA: Primary | ICD-10-CM

## 2023-02-15 DIAGNOSIS — R13.10 DYSPHAGIA, UNSPECIFIED TYPE: ICD-10-CM

## 2023-02-15 PROCEDURE — 99203 OFFICE O/P NEW LOW 30 MIN: CPT | Mod: S$PBB,,, | Performed by: STUDENT IN AN ORGANIZED HEALTH CARE EDUCATION/TRAINING PROGRAM

## 2023-02-15 PROCEDURE — 99999 PR PBB SHADOW E&M-EST. PATIENT-LVL III: CPT | Mod: PBBFAC,,, | Performed by: STUDENT IN AN ORGANIZED HEALTH CARE EDUCATION/TRAINING PROGRAM

## 2023-02-15 PROCEDURE — 99203 PR OFFICE/OUTPT VISIT, NEW, LEVL III, 30-44 MIN: ICD-10-PCS | Mod: S$PBB,,, | Performed by: STUDENT IN AN ORGANIZED HEALTH CARE EDUCATION/TRAINING PROGRAM

## 2023-02-15 PROCEDURE — 99213 OFFICE O/P EST LOW 20 MIN: CPT | Mod: PBBFAC | Performed by: STUDENT IN AN ORGANIZED HEALTH CARE EDUCATION/TRAINING PROGRAM

## 2023-02-15 PROCEDURE — 99999 PR PBB SHADOW E&M-EST. PATIENT-LVL III: ICD-10-PCS | Mod: PBBFAC,,, | Performed by: STUDENT IN AN ORGANIZED HEALTH CARE EDUCATION/TRAINING PROGRAM

## 2023-02-15 NOTE — PROGRESS NOTES
[facility] Surgery H&P Note    Subjective:       Patient ID: Re Arciniega is a 89 y.o. female.    Chief Complaint: dysphagia  HPI:  Re Arciniega is a 89 y.o. female with anemia, constipation, hypertension, thyroid disease presents today for evaluation for endoscopy. Recent labwork showed mild anemia with Hgb 10.9. patient underwent upper and lower endoscopy in 2017 for the same issue. Colonoscopy was normal. 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 Esophagus stomach duodenum otherwise normal. She denies change in bowel habits or blood in stool. She does endorse difficulty swallowing. Began about 2-3 months ago. Occurs with liquids and solids. Sometimes feels choking sensation in the throat. Then food feels like it sticks in the chest. In 2021 she had a swallow evaluation at [place] and was recommended to stick with thin liquids and easy to chew foods. An esophogram ordered by me shows bird beak narrowing of the GE junction concerning for achalasia.    Past Medical History:   Diagnosis Date    Anemia     Constipation     Hypertension     Thyroid disease      Past Surgical History:   Procedure Laterality Date    CHOLECYSTECTOMY      HYSTERECTOMY       No family history on file.  Social History     Socioeconomic History    Marital status:    Tobacco Use    Smoking status: Never    Smokeless tobacco: Never   Substance and Sexual Activity    Alcohol use: No    Drug use: No    Sexual activity: Never       Current Outpatient Medications   Medication Sig Dispense Refill    cyanocobalamin 1,000 mcg/mL injection       docusate sodium (COLACE) 50 MG capsule Take by mouth 2 (two) times daily.      erythromycin (ROMYCIN) ophthalmic ointment       ferrous sulfate (FEOSOL) 325 mg (65 mg iron) Tab tablet ferrous sulfate 325 mg (65 mg iron) tablet      hydroCHLOROthiazide (MICROZIDE) 12.5 mg capsule Take 12.5 mg by mouth every morning.      iron-vitamin C 100-250 mg, ICAR-C,  "100-250 mg Tab       levothyroxine (SYNTHROID) 88 MCG tablet       polyethylene glycol (GLYCOLAX) 17 gram PwPk Take 17 g by mouth once daily. 30 packet 0    telmisartan (MICARDIS) 80 MG Tab       losartan (COZAAR) 100 MG tablet Take 100 mg by mouth once daily.       No current facility-administered medications for this visit.     Review of patient's allergies indicates:  No Known Allergies    Review of Systems   Constitutional:  Negative for activity change, appetite change, chills and fever.   HENT:  Positive for trouble swallowing. Negative for congestion, dental problem and ear discharge.    Eyes:  Negative for discharge and itching.   Respiratory:  Negative for apnea, choking and chest tightness.    Cardiovascular:  Negative for chest pain and leg swelling.   Gastrointestinal:  Negative for abdominal distention, abdominal pain, anal bleeding, constipation, diarrhea and nausea.   Endocrine: Negative for cold intolerance and heat intolerance.   Genitourinary:  Negative for difficulty urinating and dyspareunia.   Musculoskeletal:  Positive for gait problem. Negative for arthralgias and back pain.   Skin:  Negative for color change and pallor.   Neurological:  Negative for dizziness and facial asymmetry.   Hematological:  Negative for adenopathy. Does not bruise/bleed easily.   Psychiatric/Behavioral:  Negative for agitation and behavioral problems.      Objective:      Vitals:    02/15/23 1106   BP: 132/80   Pulse: 78   Resp: 16   SpO2: 96%   Weight: 52.6 kg (116 lb)   Height: 5' 4" (1.626 m)     Physical Exam  Constitutional:       General: She is not in acute distress.     Appearance: She is well-developed.   HENT:      Head: Normocephalic and atraumatic.   Eyes:      Pupils: Pupils are equal, round, and reactive to light.   Neck:      Thyroid: No thyromegaly.   Cardiovascular:      Rate and Rhythm: Normal rate and regular rhythm.   Pulmonary:      Effort: Pulmonary effort is normal.      Breath sounds: Normal " breath sounds.   Abdominal:      General: Bowel sounds are normal. There is no distension.      Palpations: Abdomen is soft.      Tenderness: There is no abdominal tenderness.   Musculoskeletal:         General: No deformity. Normal range of motion.      Cervical back: Normal range of motion and neck supple.   Skin:     General: Skin is warm.      Capillary Refill: Capillary refill takes less than 2 seconds.      Findings: No erythema.   Neurological:      Mental Status: She is alert and oriented to person, place, and time.      Cranial Nerves: No cranial nerve deficit.   Psychiatric:         Behavior: Behavior normal.       Lab Review: CBC:   Lab Results   Component Value Date    WBC 4.09 12/21/2022    RBC 3.26 (L) 12/21/2022    HGB 10.4 (L) 12/21/2022    HCT 31.7 (L) 12/21/2022     12/21/2022     BMP:   Lab Results   Component Value Date    GLU 75 12/21/2022     12/21/2022    K 4.0 12/21/2022     12/21/2022    CO2 25 12/21/2022    BUN 25 12/21/2022    CREATININE 1.0 12/21/2022    CALCIUM 9.1 12/21/2022     Diagnostics Review: X-Ray: Reviewed   Abdominal xray normal  FL esophagram suggestive of achalasia  Assessment:       1. Dysphagia, unspecified type    2. Achalasia          Plan:   Dysphagia, unspecified type    Achalasia        Medical Decision Making/Counseling:  Mild anemia, negative findings on endoscopy in 2017  Patient and family not interested in colonoscopy at this time  Dysphagia is bothersome to patient  Referral to Dr. Geoff Rushing at [facility] for achalasia as we do not have the capabilities at this facility  Will likely need manometry  Offered to perform EGD here, patient and family declined    Patient instructed that best way to communicate with my office staff is for patient to get on the [facility] epic patient portal to expedite communication and communication issues that may occur.  Patient was given instructions on how to get on the portal.  I encouraged patient to  obtain portal access as well.  Ultimately it is up to the patient to obtain access.  Patient voiced understanding.

## 2023-02-17 ENCOUNTER — TELEPHONE (OUTPATIENT)
Dept: SURGERY | Facility: CLINIC | Age: 88
End: 2023-02-17
Payer: MEDICARE

## 2023-02-17 DIAGNOSIS — K22.0 ACHALASIA: Primary | ICD-10-CM

## 2023-02-17 NOTE — TELEPHONE ENCOUNTER
Writer spoke to patient's daughter Nahomy and let her know that a new referral to Dr Husain has been faxed over to his office. Nahomy expressed verbal understanding.

## 2023-02-17 NOTE — TELEPHONE ENCOUNTER
----- Message from Flor Call sent at 2/17/2023 11:30 AM CST -----  Regarding: referral  Contact: Patient  Type: Needs Medical Advice  Who Called:  Patient  Symptoms (please be specific):  referral   How long has patient had these symptoms:    Pharmacy name and phone #:    Best Call Back Number: [phone]    Additional Information: patient has found someone local for he referral  for gastro dept. In [facility] Please call patient once it has been sent. Thanks!

## 2023-03-10 ENCOUNTER — OFFICE VISIT (OUTPATIENT)
Dept: PODIATRY | Facility: CLINIC | Age: 88
End: 2023-03-10
Payer: MEDICARE

## 2023-03-10 VITALS
DIASTOLIC BLOOD PRESSURE: 81 MMHG | HEIGHT: 64 IN | BODY MASS INDEX: 19.81 KG/M2 | WEIGHT: 116 LBS | SYSTOLIC BLOOD PRESSURE: 144 MMHG | HEART RATE: 81 BPM

## 2023-03-10 DIAGNOSIS — L84 PRE-ULCERATIVE CALLUSES: ICD-10-CM

## 2023-03-10 DIAGNOSIS — M20.42 HAMMER TOES OF BOTH FEET: Primary | ICD-10-CM

## 2023-03-10 DIAGNOSIS — R60.0 EDEMA OF BOTH LOWER EXTREMITIES: ICD-10-CM

## 2023-03-10 DIAGNOSIS — L84 FOOT CALLUS: ICD-10-CM

## 2023-03-10 DIAGNOSIS — B35.1 ONYCHOMYCOSIS OF TOENAIL: ICD-10-CM

## 2023-03-10 DIAGNOSIS — M20.41 HAMMER TOES OF BOTH FEET: Primary | ICD-10-CM

## 2023-03-10 DIAGNOSIS — M20.12 HALLUX ABDUCTO VALGUS, BILATERAL: ICD-10-CM

## 2023-03-10 DIAGNOSIS — M20.11 HALLUX ABDUCTO VALGUS, BILATERAL: ICD-10-CM

## 2023-03-10 PROCEDURE — 99214 PR OFFICE/OUTPT VISIT, EST, LEVL IV, 30-39 MIN: ICD-10-PCS | Mod: S$PBB,,, | Performed by: PODIATRIST

## 2023-03-10 PROCEDURE — 99999 PR PBB SHADOW E&M-EST. PATIENT-LVL IV: CPT | Mod: PBBFAC,,, | Performed by: PODIATRIST

## 2023-03-10 PROCEDURE — 99999 PR PBB SHADOW E&M-EST. PATIENT-LVL IV: ICD-10-PCS | Mod: PBBFAC,,, | Performed by: PODIATRIST

## 2023-03-10 PROCEDURE — 99214 OFFICE O/P EST MOD 30 MIN: CPT | Mod: PBBFAC | Performed by: PODIATRIST

## 2023-03-10 PROCEDURE — 99214 OFFICE O/P EST MOD 30 MIN: CPT | Mod: S$PBB,,, | Performed by: PODIATRIST

## 2023-03-12 NOTE — PROGRESS NOTES
Subjective:       Patient ID: Re Arciniega is a 89 y.o. female.    Chief Complaint: Follow-up and Foot Ulcer  Patient presents with her   Granddaughter for follow-up ulcer 2nd digit bilateral.  Patient states she has been applying gentian violet about once a week.   Applies cotton between the toes daily. Pain level has gone down significantly unless the toes are touched or moved.  Is wearing wide comfortable slippers with rubber insole most of the times indoors and out.    Past Medical History:   Diagnosis Date    Anemia     Constipation     Hypertension     Thyroid disease      Past Surgical History:   Procedure Laterality Date    CHOLECYSTECTOMY      HYSTERECTOMY       History reviewed. No pertinent family history.  Social History     Socioeconomic History    Marital status:    Tobacco Use    Smoking status: Never    Smokeless tobacco: Never   Substance and Sexual Activity    Alcohol use: No    Drug use: No    Sexual activity: Never       Current Outpatient Medications   Medication Sig Dispense Refill    cyanocobalamin 1,000 mcg/mL injection       docusate sodium (COLACE) 50 MG capsule Take by mouth 2 (two) times daily.      erythromycin (ROMYCIN) ophthalmic ointment       ferrous sulfate (FEOSOL) 325 mg (65 mg iron) Tab tablet ferrous sulfate 325 mg (65 mg iron) tablet      hydroCHLOROthiazide (MICROZIDE) 12.5 mg capsule Take 12.5 mg by mouth every morning.      iron-vitamin C 100-250 mg, ICAR-C, 100-250 mg Tab       levothyroxine (SYNTHROID) 88 MCG tablet       losartan (COZAAR) 100 MG tablet Take 100 mg by mouth once daily.      polyethylene glycol (GLYCOLAX) 17 gram PwPk Take 17 g by mouth once daily. 30 packet 0    telmisartan (MICARDIS) 80 MG Tab        No current facility-administered medications for this visit.     Review of patient's allergies indicates:  No Known Allergies    Review of Systems   HENT:  Negative for congestion.    Respiratory:  Negative for cough and shortness of breath.   "  Cardiovascular:  Positive for leg swelling.   Musculoskeletal:  Positive for gait problem.        Walker   All other systems reviewed and are negative.    Objective:      Vitals:    03/10/23 1337   BP: (!) 144/81   Pulse: 81   Weight: 52.6 kg (116 lb)   Height: 5' 4" (1.626 m)     Physical Exam  Vitals and nursing note reviewed. Exam conducted with a chaperone present.   Constitutional:       General: She is not in acute distress.  Cardiovascular:      Pulses:           Dorsalis pedis pulses are 1+ on the right side and 1+ on the left side.        Posterior tibial pulses are 1+ on the right side and 1+ on the left side.   Pulmonary:      Effort: Pulmonary effort is normal.   Musculoskeletal:         General: Swelling, tenderness and deformity present.      Right lower leg: Edema present.      Left lower leg: Edema present.      Right foot: Decreased range of motion. Deformity (hammertoe 2nd b/l) and bunion present.      Left foot: Decreased range of motion. Deformity and bunion present.      Comments: Significant decrease in pain 2nd digit left, pain 5th digit right foot/painful lesion/callus    Feet:      Right foot:      Skin integrity: Ulcer (Ulcer limited to breakdown of the skin same location medial 2nd digit right bilateral due to hammertoe, bunion and chronic friction in this location.  No infection at this time) and callus (Painful deep IPK sub 4th met head right foot and plantar right heel) present. No erythema.      Toenail Condition: Right toenails are abnormally thick. Fungal disease present.     Left foot:      Skin integrity: Ulcer and callus (Callus plantar and posterior left heel stable) present. No erythema.      Toenail Condition: Left toenails are abnormally thick. Fungal disease present.  Skin:     Capillary Refill: Capillary refill takes 2 to 3 seconds.   Neurological:      General: No focal deficit present.   Psychiatric:         Behavior: Behavior normal.                        Assessment: "       1. Hammer toes of both feet    2. Pre-ulcerative calluses    3. Hallux abducto valgus, bilateral    4. Edema of both lower extremities    5. Onychomycosis of toenail    6. Foot callus          Plan:           Reviewed ulcers on the inside both 2nd digits due to pressure from the hammertoe and bunion.    Advised because these areas have been kept dry they have healed completely but unfortunately due to digital deformities and bunions they continue to develop callus.  Encouraged patient to continue gentian violet once a week, cotton between the toes every day and encouraged family member to check between patient's toes on a regular basis since she is prone to developing digital ulcerations  Today debridement medial aspect 2nd digit bilateral revealed completely healthy smooth skin with no complications at this time   2 x 2 gauze applied between 1st and 2nd digits today  Reviewed swelling, skin care, potential complications    Callus/pre-ulcerative calluses debrided plantar right heel along with posterior aspect of both heels.  These areas are stable without complication at this time  Patient is at high risk  Reviewed care and maintenance of pre ulcerative calluses  Reviewed care of nails. Nails debrided, thickness reduced  Reviewed soaking in warm water and Epson salt few times a week  Check feet daily, contact office with any area of concern which has not improved in a few days  Patient/family were in understanding and agreement with treatment plan.  I counseled the patient on their conditions, implications and medical management.  Instructed patient/family to contact the office with any changes, questions, concerns, worsening of symptoms.   Total face-to-face time, exam, assessment, treatment, discussion, documentation 30 minutes, more than half this time spent on consultation and coordination of care.  Follow up 8 weeks      This note was created using [employer] voice recognition software that occasionally  misinterpreted phrases or words.

## 2023-03-23 ENCOUNTER — TELEPHONE (OUTPATIENT)
Dept: SURGERY | Facility: CLINIC | Age: 88
End: 2023-03-23
Payer: MEDICARE

## 2023-03-23 NOTE — TELEPHONE ENCOUNTER
"Writer spoke to patient's daughter Nahomy to make sure patient was taken care of for her achalasia by Dr Jonel Husain and Nahomy stated that she saw Dr Husain in clinic and is scheduled for a "ballooned Egd procedure" this Tuesday coming up 03/28/23. Writer expressed verbal understanding. Nahomy thanked writer for following up on her mom.  "

## 2023-05-12 ENCOUNTER — OFFICE VISIT (OUTPATIENT)
Dept: PODIATRY | Facility: CLINIC | Age: 88
End: 2023-05-12
Payer: MEDICARE

## 2023-05-12 VITALS
HEART RATE: 70 BPM | WEIGHT: 116 LBS | BODY MASS INDEX: 19.81 KG/M2 | DIASTOLIC BLOOD PRESSURE: 71 MMHG | HEIGHT: 64 IN | SYSTOLIC BLOOD PRESSURE: 145 MMHG

## 2023-05-12 DIAGNOSIS — L84 FOOT CALLUS: ICD-10-CM

## 2023-05-12 DIAGNOSIS — B35.1 ONYCHOMYCOSIS OF TOENAIL: ICD-10-CM

## 2023-05-12 DIAGNOSIS — L84 PRE-ULCERATIVE CALLUSES: ICD-10-CM

## 2023-05-12 DIAGNOSIS — M20.41 HAMMER TOES OF BOTH FEET: ICD-10-CM

## 2023-05-12 DIAGNOSIS — M20.42 HAMMER TOES OF BOTH FEET: ICD-10-CM

## 2023-05-12 DIAGNOSIS — R60.0 EDEMA OF BOTH LOWER EXTREMITIES: ICD-10-CM

## 2023-05-12 DIAGNOSIS — M20.12 HALLUX ABDUCTO VALGUS, BILATERAL: ICD-10-CM

## 2023-05-12 DIAGNOSIS — M20.11 HALLUX ABDUCTO VALGUS, BILATERAL: ICD-10-CM

## 2023-05-12 DIAGNOSIS — L97.511 SKIN ULCER OF SECOND TOE, RIGHT, LIMITED TO BREAKDOWN OF SKIN: Primary | ICD-10-CM

## 2023-05-12 DIAGNOSIS — M20.42 HAMMERTOE OF SECOND TOE OF LEFT FOOT: ICD-10-CM

## 2023-05-12 PROCEDURE — 99214 OFFICE O/P EST MOD 30 MIN: CPT | Mod: S$PBB,,, | Performed by: PODIATRIST

## 2023-05-12 PROCEDURE — 99214 PR OFFICE/OUTPT VISIT, EST, LEVL IV, 30-39 MIN: ICD-10-PCS | Mod: S$PBB,,, | Performed by: PODIATRIST

## 2023-05-12 PROCEDURE — 99999 PR PBB SHADOW E&M-EST. PATIENT-LVL IV: CPT | Mod: PBBFAC,,, | Performed by: PODIATRIST

## 2023-05-12 PROCEDURE — 99214 OFFICE O/P EST MOD 30 MIN: CPT | Mod: PBBFAC | Performed by: PODIATRIST

## 2023-05-12 PROCEDURE — 99999 PR PBB SHADOW E&M-EST. PATIENT-LVL IV: ICD-10-PCS | Mod: PBBFAC,,, | Performed by: PODIATRIST

## 2023-05-15 NOTE — PROGRESS NOTES
Subjective:       Patient ID: Re Arciniega is a 89 y.o. female.    Chief Complaint: Foot Pain and Foot Ulcer  Patient presents with her daughter for follow-up ulcers 2nd digit bilateral.  Follow-up hammertoes, bunions and preulcerative callus of the heels.  Patient relates heels have been painful with no change.  Patient has not applied gentian violet to the 2nd digits, uses soft makeup pad between the 1st and 2nd digits to take pressure off this area.  Reports these areas are very painful when touched, the toes moved or spread apart.  When sitting no pain       Past Medical History:   Diagnosis Date    Anemia     Constipation     Hypertension     Thyroid disease      Past Surgical History:   Procedure Laterality Date    CHOLECYSTECTOMY      HYSTERECTOMY       History reviewed. No pertinent family history.  Social History     Socioeconomic History    Marital status:    Tobacco Use    Smoking status: Never    Smokeless tobacco: Never   Substance and Sexual Activity    Alcohol use: No    Drug use: No    Sexual activity: Never       Current Outpatient Medications   Medication Sig Dispense Refill    cyanocobalamin 1,000 mcg/mL injection       docusate sodium (COLACE) 50 MG capsule Take by mouth 2 (two) times daily.      erythromycin (ROMYCIN) ophthalmic ointment       ferrous sulfate (FEOSOL) 325 mg (65 mg iron) Tab tablet ferrous sulfate 325 mg (65 mg iron) tablet      hydroCHLOROthiazide (MICROZIDE) 12.5 mg capsule Take 12.5 mg by mouth every morning.      iron-vitamin C 100-250 mg, ICAR-C, 100-250 mg Tab       levothyroxine (SYNTHROID) 88 MCG tablet       losartan (COZAAR) 100 MG tablet Take 100 mg by mouth once daily.      polyethylene glycol (GLYCOLAX) 17 gram PwPk Take 17 g by mouth once daily. 30 packet 0    telmisartan (MICARDIS) 80 MG Tab        No current facility-administered medications for this visit.     Review of patient's allergies indicates:  No Known Allergies    Review of Systems   HENT:   "Negative for congestion.    Respiratory:  Negative for cough.    Cardiovascular:  Positive for leg swelling.   Musculoskeletal:  Positive for gait problem.        Walker   All other systems reviewed and are negative.    Objective:      Vitals:    05/12/23 1111   BP: (!) 145/71   Pulse: 70   Weight: 52.6 kg (116 lb)   Height: 5' 4" (1.626 m)     Physical Exam  Vitals and nursing note reviewed. Exam conducted with a chaperone present.   Constitutional:       General: She is not in acute distress.     Appearance: Normal appearance.   Cardiovascular:      Pulses:           Dorsalis pedis pulses are 1+ on the right side and 1+ on the left side.        Posterior tibial pulses are 1+ on the right side and 1+ on the left side.   Pulmonary:      Effort: Pulmonary effort is normal.   Musculoskeletal:         General: Swelling, tenderness and deformity present.      Right lower leg: Edema present.      Left lower leg: Edema present.      Right foot: Decreased range of motion. Deformity (hammertoe 2nd b/l) and bunion present.      Left foot: Decreased range of motion. Deformity and bunion present.      Comments: Pain, hypersensitive 2nd digit bilateral    Feet:      Right foot:      Skin integrity: Ulcer (Ulcer limited to breakdown of the skin medial 2nd digit right due to hammertoe, bunion and chronic friction. Macerated, granular base, very painful, edema, no calor. Maceration lateral IPJ right hallux, no skin opening) and callus present. No erythema.      Toenail Condition: Right toenails are abnormally thick. Fungal disease present.     Left foot:      Skin integrity: Ulcer (Raised thick callus with underlying maceration medial 2nd digit left, no skin opeining. Maceration lateral IPJ hallux, no callus or skin opening. Edema, no caore) and callus (Callus plantar and posterior left heel stable) present. No erythema.      Toenail Condition: Left toenails are abnormally thick. Fungal disease present.  Skin:     Capillary " Refill: Capillary refill takes 2 to 3 seconds.   Neurological:      General: No focal deficit present.      Mental Status: She is alert.   Psychiatric:         Mood and Affect: Mood normal.         Behavior: Behavior normal.                            Assessment:       1. Skin ulcer of second toe, right, limited to breakdown of skin    2. Hammer toes of both feet    3. Hammertoe of second toe of left foot    4. Hallux abducto valgus, bilateral    5. Edema of both lower extremities    6. Pre-ulcerative calluses - Left Foot    7. Onychomycosis of toenail    8. Foot callus          Plan:         Reviewed digital deformities, hammertoes, bunions, arthritis, lack of range of motion toes and front of feet all complicated with chronic edema  Reviewed ulcers on the inside both 2nd digits due to pressure from the hammertoe and bunion, this area on the inside of the right 2nd digit remains an open ulcer.  Explained why patient needs to d/c soaking  High risk for complications due to digital deformities and edema.   Advised because these areas have remained moist this has increased her pain with high risk for infection  Apply gentian violet every other day, apply silver alginate, showed patient how to apply, then utilize cushion.  Keep between the toes dry  Discussed silver alginate, medication in this wound care product to try to prevent infection and to absorb moisture.  Needs to be applied/change daily.    Do not wrap the toes   Do not apply ointment, antibiotic ointment, lotion or moisturizer  Wound care  Debridement ulcer 2nd digit right foot, pre ulcerative callus left foot.  Iodine, silver alginate, gauze applied between the toes  Callus/pre-ulcerative calluses debrided plantar right heel along with posterior aspect of both heels.  These areas are stable without complication at this time  Reviewed care and maintenance of heel calluses  Reviewed care of nails. Nails debrided, thickness reduced  Check feet daily, contact  office with any area of concern which has not improved in a few days  Patient/family were in understanding and agreement with treatment plan.  I counseled the patient on their conditions, implications and medical management.  Instructed patient/family to contact the office with any changes, questions, concerns, worsening of symptoms.   Total face-to-face time, exam, assessment, treatment, discussion, documentation 30 minutes, more than half this time spent on consultation and coordination of care.  Follow up 4 weeks      This note was created using [employer] voice recognition software that occasionally misinterpreted phrases or words.

## 2023-06-01 ENCOUNTER — HOSPITAL ENCOUNTER (OUTPATIENT)
Dept: RADIOLOGY | Facility: HOSPITAL | Age: 88
Discharge: HOME | End: 2023-06-01
Attending: FAMILY MEDICINE
Payer: MEDICARE

## 2023-06-01 DIAGNOSIS — M79.669 CALF PAIN: ICD-10-CM

## 2023-06-01 DIAGNOSIS — R60.9 EDEMA: ICD-10-CM

## 2023-06-01 PROCEDURE — 93970 US LOWER EXTREMITY VEINS BILATERAL: ICD-10-PCS | Mod: 26,,, | Performed by: RADIOLOGY

## 2023-06-01 PROCEDURE — 93970 EXTREMITY STUDY: CPT | Mod: 26,,, | Performed by: RADIOLOGY

## 2023-06-01 PROCEDURE — 93970 EXTREMITY STUDY: CPT | Mod: TC

## 2023-06-07 RX ORDER — HYDROCHLOROTHIAZIDE 25 MG/1
TABLET ORAL
COMMUNITY
Start: 2023-05-31 | End: 2023-09-07 | Stop reason: ALTCHOICE

## 2023-06-07 RX ORDER — HYDROCHLOROTHIAZIDE 12.5 MG/1
12.5 TABLET ORAL
COMMUNITY
Start: 2023-03-06 | End: 2023-06-11 | Stop reason: SDUPTHER

## 2023-06-07 RX ORDER — POTASSIUM CHLORIDE 750 MG/1
TABLET, EXTENDED RELEASE ORAL
Status: ON HOLD | COMMUNITY
Start: 2023-05-31 | End: 2024-01-31

## 2023-06-08 ENCOUNTER — OFFICE VISIT (OUTPATIENT)
Dept: PODIATRY | Facility: CLINIC | Age: 88
End: 2023-06-08
Payer: MEDICARE

## 2023-06-08 VITALS
DIASTOLIC BLOOD PRESSURE: 71 MMHG | WEIGHT: 113.63 LBS | RESPIRATION RATE: 16 BRPM | HEART RATE: 68 BPM | SYSTOLIC BLOOD PRESSURE: 126 MMHG | BODY MASS INDEX: 19.4 KG/M2 | HEIGHT: 64 IN

## 2023-06-08 DIAGNOSIS — M20.12 HALLUX ABDUCTO VALGUS, BILATERAL: ICD-10-CM

## 2023-06-08 DIAGNOSIS — M20.11 HALLUX ABDUCTO VALGUS, BILATERAL: ICD-10-CM

## 2023-06-08 DIAGNOSIS — M20.41 HAMMER TOES OF BOTH FEET: ICD-10-CM

## 2023-06-08 DIAGNOSIS — L84 PRE-ULCERATIVE CALLUSES: ICD-10-CM

## 2023-06-08 DIAGNOSIS — M79.675 PAIN AND SWELLING OF TOE OF LEFT FOOT: ICD-10-CM

## 2023-06-08 DIAGNOSIS — R60.0 EDEMA OF BOTH LOWER EXTREMITIES: ICD-10-CM

## 2023-06-08 DIAGNOSIS — M20.42 HAMMER TOES OF BOTH FEET: ICD-10-CM

## 2023-06-08 DIAGNOSIS — L97.511 SKIN ULCER OF SECOND TOE, RIGHT, LIMITED TO BREAKDOWN OF SKIN: Primary | ICD-10-CM

## 2023-06-08 DIAGNOSIS — M79.89 PAIN AND SWELLING OF TOE OF LEFT FOOT: ICD-10-CM

## 2023-06-08 PROCEDURE — 99214 OFFICE O/P EST MOD 30 MIN: CPT | Mod: S$PBB,,, | Performed by: PODIATRIST

## 2023-06-08 PROCEDURE — 99214 PR OFFICE/OUTPT VISIT, EST, LEVL IV, 30-39 MIN: ICD-10-PCS | Mod: S$PBB,,, | Performed by: PODIATRIST

## 2023-06-08 PROCEDURE — 99999 PR PBB SHADOW E&M-EST. PATIENT-LVL IV: CPT | Mod: PBBFAC,,, | Performed by: PODIATRIST

## 2023-06-08 PROCEDURE — 99999 PR PBB SHADOW E&M-EST. PATIENT-LVL IV: ICD-10-PCS | Mod: PBBFAC,,, | Performed by: PODIATRIST

## 2023-06-08 PROCEDURE — 99214 OFFICE O/P EST MOD 30 MIN: CPT | Mod: PBBFAC | Performed by: PODIATRIST

## 2023-06-11 NOTE — PROGRESS NOTES
Subjective:       Patient ID: Re Arciniega is a 89 y.o. female.    Chief Complaint: Follow-up, Skin Ulcer, Foot Pain, Callouses, Hammer Toe, and Foot Swelling  Patient presents with her daughter for follow-up ulcers 2nd digit bilateral, follow-up hammertoes, bunions and preulcerative callus of the heels.  Patient relates toes are more painful, feet are also very painful. Wants to know why she has all this swelling in her feet and legs. Has been soaking her feet.      Past Medical History:   Diagnosis Date    Anemia     Constipation     Hypertension     Thyroid disease      Past Surgical History:   Procedure Laterality Date    CHOLECYSTECTOMY      HYSTERECTOMY       History reviewed. No pertinent family history.  Social History     Socioeconomic History    Marital status:    Tobacco Use    Smoking status: Never    Smokeless tobacco: Never   Substance and Sexual Activity    Alcohol use: No    Drug use: No    Sexual activity: Never       Current Outpatient Medications   Medication Sig Dispense Refill    cyanocobalamin 1,000 mcg/mL injection       docusate sodium (COLACE) 50 MG capsule Take by mouth 2 (two) times daily.      erythromycin (ROMYCIN) ophthalmic ointment       ferrous sulfate (FEOSOL) 325 mg (65 mg iron) Tab tablet ferrous sulfate 325 mg (65 mg iron) tablet      iron-vitamin C 100-250 mg, ICAR-C, 100-250 mg Tab       levothyroxine (SYNTHROID) 88 MCG tablet       losartan (COZAAR) 100 MG tablet Take 100 mg by mouth once daily.      polyethylene glycol (GLYCOLAX) 17 gram PwPk Take 17 g by mouth once daily. 30 packet 0    potassium chloride SA (K-DUR,KLOR-CON M) 10 MEQ tablet       telmisartan (MICARDIS) 80 MG Tab       hydroCHLOROthiazide (HYDRODIURIL) 25 MG tablet       hydroCHLOROthiazide (MICROZIDE) 12.5 mg capsule Take 12.5 mg by mouth every morning.       No current facility-administered medications for this visit.     Review of patient's allergies indicates:  No Known Allergies    Review of  "Systems   Respiratory:  Negative for shortness of breath.    Cardiovascular:  Positive for leg swelling.   Musculoskeletal:  Positive for gait problem.        Walker   All other systems reviewed and are negative.    Objective:      Vitals:    06/08/23 1347   BP: 126/71   Pulse: 68   Resp: 16   Weight: 51.5 kg (113 lb 9.6 oz)   Height: 5' 4" (1.626 m)     Physical Exam  Vitals and nursing note reviewed. Exam conducted with a chaperone present.   Constitutional:       General: She is not in acute distress.     Appearance: Normal appearance.   Cardiovascular:      Pulses:           Dorsalis pedis pulses are 1+ on the right side and 1+ on the left side.        Posterior tibial pulses are 1+ on the right side and 1+ on the left side.   Pulmonary:      Effort: Pulmonary effort is normal.   Musculoskeletal:         General: Swelling, tenderness and deformity present.      Right lower leg: Edema present.      Left lower leg: Edema present.      Right foot: Decreased range of motion. Deformity (hammertoe 2nd b/l) and bunion present.      Left foot: Decreased range of motion. Deformity and bunion present.      Comments: Pain, edema digits, feet, very painful, hypersensitive 2nd digit bilateral    Feet:      Right foot:      Skin integrity: Ulcer (macerated ulcer limited to breakdown of the skin medial 2nd digit right due to hammertoe, bunion and chronic pressure, very painful, no tracking) and callus present. No erythema.      Toenail Condition: Right toenails are abnormally thick. Fungal disease present.     Left foot:      Skin integrity: Ulcer (Healed ulcer, raised pre ulcerative callus medial 2nd digit left, much improved. No maceration, still very painful) and callus (Callus plantar and posterior left heel stable) present. No erythema.      Toenail Condition: Left toenails are abnormally thick. Fungal disease present.  Skin:     Capillary Refill: Capillary refill takes 2 to 3 seconds.   Neurological:      General: No " focal deficit present.      Mental Status: She is alert.      Comments: Neuritis pain bilateral feet   Psychiatric:         Behavior: Behavior normal.                            Assessment:       1. Skin ulcer of second toe, right, limited to breakdown of skin    2. Hammer toes of both feet    3. Hallux abducto valgus, bilateral    4. Pain and swelling of toe of left foot - Left Foot    5. Edema of both lower extremities    6. Pre-ulcerative calluses - Left Foot          Plan:         Explained 2nd digit left is dry and much improved, but moisture 2nd right puts her at great risk for infection  Showed these areas to her daughter   Reviewed swelling causing pressure on digital deformities, hammertoes, bunions, arthritis  Advised patient and daughter swelling has been ongoing and chronic before I started seeing her.  Unfortunately longer the swelling progresses the more difficult it is to resolve.  She would need to have a lengthy discussion with her PCP if anything further could be done to resolve the swelling.  Advised patient at the very least she should have her feet elevated at all times when at home  We discussed pain associated with chronic swelling.  Explained swelling causes compression of the nerves, her toes are very painful but she also has neuritis/inflammation across the ball of both feet due to chronic swelling.  Unfortunately healing these ulcers on the 2nd digits I do not believe are going to make much difference regarding her pain  Again explained why patient needs to d/c soaking, no lotion, ointment between the toes  Since her daughter has been present with her on just the last 2 visits explained patient pretty much been noncompliant not followed advised regarding keeping areas dry to prevent infection.  High risk for complications due to digital deformities and edema.   Wound care  Debridement ulcer 2nd digit right foot, pre ulcerative callus left foot.  Iodine, silver alginate, applied between the  toes right foot, silver alginate applied between the toes left foot.  Instructed to perform this treatment daily  Callus/pre-ulcerative calluses debrided plantar right heel along with posterior aspect of both heels. Areas are stable without complications  Reviewed care and maintenance of nails, heel calluses  Check feet daily, contact office with any area of concern which has not improved in a few days  Patient was in understanding and agreement with treatment plan.  I counseled the patient on their conditions, implications and medical management.  Instructed patient/family to contact the office with any changes, questions, concerns, worsening of symptoms.   Total face to face time 30 minutes, exam, assessment, treatment, discussion, additional time for review of chart prior to and following appointment and visit documentation, consultation and coordination of care.    Follow up 4 weeks      This note was created using [employer] voice recognition software that occasionally misinterpreted phrases or words.

## 2023-07-06 ENCOUNTER — HOSPITAL ENCOUNTER (OUTPATIENT)
Dept: CARDIOLOGY | Facility: HOSPITAL | Age: 88
Discharge: HOME | End: 2023-07-06
Attending: FAMILY MEDICINE
Payer: MEDICARE

## 2023-07-06 VITALS — WEIGHT: 113 LBS | HEIGHT: 64 IN | BODY MASS INDEX: 19.29 KG/M2

## 2023-07-06 DIAGNOSIS — R60.9 EDEMA: ICD-10-CM

## 2023-07-06 DIAGNOSIS — R01.1 HEART MURMUR: ICD-10-CM

## 2023-07-06 DIAGNOSIS — I50.9 CHF (CONGESTIVE HEART FAILURE): ICD-10-CM

## 2023-07-06 LAB
AORTIC ROOT ANNULUS: 3.03 CM
AORTIC VALVE CUSP SEPERATION: 0.95 CM
ASCENDING AORTA: 2.78 CM
AV INDEX (PROSTH): 0.36
AV MEAN GRADIENT: 13 MMHG
AV PEAK GRADIENT: 21 MMHG
AV REGURGITATION PRESSURE HALF TIME: 744.57 MS
AV VALVE AREA: 0.9 CM2
AV VELOCITY RATIO: 0.37
BSA FOR ECHO PROCEDURE: 1.52 M2
CV ECHO LV RWT: 0.69 CM
DOP CALC AO PEAK VEL: 2.28 M/S
DOP CALC AO VTI: 57.2 CM
DOP CALC LVOT AREA: 2.5 CM2
DOP CALC LVOT DIAMETER: 1.78 CM
DOP CALC LVOT PEAK VEL: 0.85 M/S
DOP CALC LVOT STROKE VOLUME: 51.48 CM3
DOP CALC MV VTI: 31.2 CM
DOP CALCLVOT PEAK VEL VTI: 20.7 CM
E WAVE DECELERATION TIME: 214.89 MSEC
E/A RATIO: 0.58
E/E' RATIO: 21.43 M/S
ECHO LV POSTERIOR WALL: 1.26 CM (ref 0.6–1.1)
EJECTION FRACTION: 45 %
FRACTIONAL SHORTENING: 22 % (ref 28–44)
INTERVENTRICULAR SEPTUM: 1.31 CM (ref 0.6–1.1)
IVC DIAMETER: 1.13 CM
IVRT: 49.48 MSEC
LA MAJOR: 4.02 CM
LA MINOR: 2.78 CM
LEFT ATRIUM SIZE: 4.13 CM
LEFT ATRIUM VOLUME INDEX MOD: 13.3 ML/M2
LEFT ATRIUM VOLUME MOD: 20.32 CM3
LEFT INTERNAL DIMENSION IN SYSTOLE: 2.84 CM (ref 2.1–4)
LEFT VENTRICLE DIASTOLIC VOLUME INDEX: 36.2 ML/M2
LEFT VENTRICLE DIASTOLIC VOLUME: 55.39 ML
LEFT VENTRICLE MASS INDEX: 104 G/M2
LEFT VENTRICLE SYSTOLIC VOLUME INDEX: 20 ML/M2
LEFT VENTRICLE SYSTOLIC VOLUME: 30.61 ML
LEFT VENTRICULAR INTERNAL DIMENSION IN DIASTOLE: 3.63 CM (ref 3.5–6)
LEFT VENTRICULAR MASS: 159.09 G
LV LATERAL E/E' RATIO: 18.75 M/S
LV SEPTAL E/E' RATIO: 25 M/S
LVOT MG: 1.75 MMHG
LVOT MV: 0.63 CM/S
MV MEAN GRADIENT: 2 MMHG
MV PEAK A VEL: 1.29 M/S
MV PEAK E VEL: 0.75 M/S
MV PEAK GRADIENT: 7 MMHG
MV STENOSIS PRESSURE HALF TIME: 56.48 MS
MV VALVE AREA BY CONTINUITY EQUATION: 1.65 CM2
MV VALVE AREA P 1/2 METHOD: 3.9 CM2
PISA AR MAX VEL: 3.75 M/S
PISA MRMAX VEL: 2.5 M/S
PISA TR MAX VEL: 2.67 M/S
PULM VEIN S/D RATIO: 3.42
PV MEAN GRADIENT: 2 MMHG
PV MV: 0.64 M/S
PV PEAK D VEL: 0.24 M/S
PV PEAK S VEL: 0.82 M/S
PV PEAK VELOCITY: 0.83 CM/S
RA MAJOR: 3.3 CM
RA PRESSURE: 3 MMHG
RA WIDTH: 3.31 CM
RIGHT VENTRICULAR END-DIASTOLIC DIMENSION: 2.67 CM
RIGHT VENTRICULAR LENGTH IN DIASTOLE (APICAL 4-CHAMBER VIEW): 49.4 CM
RV MID DIAMA: 1.42 CM
STJ: 2.79 CM
TDI LATERAL: 0.04 M/S
TDI SEPTAL: 0.03 M/S
TDI: 0.04 M/S
TR MAX PG: 29 MMHG
TRICUSPID VALVE PEAK A WAVE VELOCITY: 0.49 M/S
TV PEAK E VEL: 0.4 M/S
TV REST PULMONARY ARTERY PRESSURE: 32 MMHG

## 2023-07-06 PROCEDURE — 93306 TTE W/DOPPLER COMPLETE: CPT | Mod: 26,,, | Performed by: INTERNAL MEDICINE

## 2023-07-06 PROCEDURE — 93356 MYOCRD STRAIN IMG SPCKL TRCK: CPT

## 2023-07-06 PROCEDURE — 93356 MYOCRD STRAIN IMG SPCKL TRCK: CPT | Mod: ,,, | Performed by: INTERNAL MEDICINE

## 2023-07-06 PROCEDURE — 93356 ECHO (CUPID ONLY): ICD-10-PCS | Mod: ,,, | Performed by: INTERNAL MEDICINE

## 2023-07-06 PROCEDURE — 93306 ECHO (CUPID ONLY): ICD-10-PCS | Mod: 26,,, | Performed by: INTERNAL MEDICINE

## 2023-07-07 ENCOUNTER — OFFICE VISIT (OUTPATIENT)
Dept: PODIATRY | Facility: CLINIC | Age: 88
End: 2023-07-07
Payer: MEDICARE

## 2023-07-07 VITALS
HEART RATE: 73 BPM | WEIGHT: 113 LBS | DIASTOLIC BLOOD PRESSURE: 73 MMHG | HEIGHT: 64 IN | BODY MASS INDEX: 19.29 KG/M2 | SYSTOLIC BLOOD PRESSURE: 133 MMHG

## 2023-07-07 DIAGNOSIS — R60.0 EDEMA OF BOTH LOWER EXTREMITIES: ICD-10-CM

## 2023-07-07 DIAGNOSIS — M20.41 HAMMER TOES OF BOTH FEET: ICD-10-CM

## 2023-07-07 DIAGNOSIS — B35.1 ONYCHOMYCOSIS OF TOENAIL: ICD-10-CM

## 2023-07-07 DIAGNOSIS — L97.511 SKIN ULCER OF SECOND TOE, RIGHT, LIMITED TO BREAKDOWN OF SKIN: Primary | ICD-10-CM

## 2023-07-07 DIAGNOSIS — L84 PRE-ULCERATIVE CALLUSES: ICD-10-CM

## 2023-07-07 DIAGNOSIS — M20.42 HAMMER TOES OF BOTH FEET: ICD-10-CM

## 2023-07-07 DIAGNOSIS — L84 FOOT CALLUS: ICD-10-CM

## 2023-07-07 DIAGNOSIS — M20.11 HALLUX ABDUCTO VALGUS, BILATERAL: ICD-10-CM

## 2023-07-07 DIAGNOSIS — M20.12 HALLUX ABDUCTO VALGUS, BILATERAL: ICD-10-CM

## 2023-07-07 PROCEDURE — 99999 PR PBB SHADOW E&M-EST. PATIENT-LVL IV: CPT | Mod: PBBFAC,,, | Performed by: PODIATRIST

## 2023-07-07 PROCEDURE — 99999 PR PBB SHADOW E&M-EST. PATIENT-LVL IV: ICD-10-PCS | Mod: PBBFAC,,, | Performed by: PODIATRIST

## 2023-07-07 PROCEDURE — 99214 OFFICE O/P EST MOD 30 MIN: CPT | Mod: PBBFAC | Performed by: PODIATRIST

## 2023-07-07 PROCEDURE — 99214 PR OFFICE/OUTPT VISIT, EST, LEVL IV, 30-39 MIN: ICD-10-PCS | Mod: S$PBB,,, | Performed by: PODIATRIST

## 2023-07-07 PROCEDURE — 99214 OFFICE O/P EST MOD 30 MIN: CPT | Mod: S$PBB,,, | Performed by: PODIATRIST

## 2023-07-10 NOTE — PROGRESS NOTES
Subjective:       Patient ID: Re Arciniega is a 89 y.o. female.    Chief Complaint: Foot Ulcer and Hammer Toe  Patient presents with her daughter for follow-up ulcers 2nd digit bilateral, follow-up hammertoes, bunions and preulcerative callus of the heels.  Patient relates keeping daya dry, applying iodine and placing silver alginate between the 1st and 2nd digits daily.  Relates having ultrasounds to rule out clot since last visit patient states results were normal with no clots found.  Relates chronic swelling in her legs for many years.  Feet and legs are sensitive and painful all the time. Pain level 9/10      Past Medical History:   Diagnosis Date    Anemia     Constipation     Hypertension     Thyroid disease      Past Surgical History:   Procedure Laterality Date    CHOLECYSTECTOMY      HYSTERECTOMY       No family history on file.  Social History     Socioeconomic History    Marital status:    Tobacco Use    Smoking status: Never    Smokeless tobacco: Never   Substance and Sexual Activity    Alcohol use: No    Drug use: No    Sexual activity: Never       Current Outpatient Medications   Medication Sig Dispense Refill    cyanocobalamin 1,000 mcg/mL injection       docusate sodium (COLACE) 50 MG capsule Take by mouth 2 (two) times daily.      erythromycin (ROMYCIN) ophthalmic ointment       ferrous sulfate (FEOSOL) 325 mg (65 mg iron) Tab tablet ferrous sulfate 325 mg (65 mg iron) tablet      hydroCHLOROthiazide (HYDRODIURIL) 25 MG tablet       iron-vitamin C 100-250 mg, ICAR-C, 100-250 mg Tab       levothyroxine (SYNTHROID) 88 MCG tablet       losartan (COZAAR) 100 MG tablet Take 100 mg by mouth once daily.      polyethylene glycol (GLYCOLAX) 17 gram PwPk Take 17 g by mouth once daily. 30 packet 0    potassium chloride SA (K-DUR,KLOR-CON M) 10 MEQ tablet       hydroCHLOROthiazide (MICROZIDE) 12.5 mg capsule Take 12.5 mg by mouth every morning.      telmisartan (MICARDIS) 80 MG Tab        No current  "facility-administered medications for this visit.     Review of patient's allergies indicates:  No Known Allergies    Review of Systems   Cardiovascular:  Positive for leg swelling.   Musculoskeletal:  Positive for gait problem.        Walker   All other systems reviewed and are negative.    Objective:      Vitals:    07/07/23 1352   BP: 133/73   BP Location: Right arm   Patient Position: Sitting   Pulse: 73   Weight: 51.3 kg (113 lb)   Height: 5' 4" (1.626 m)     Physical Exam  Vitals and nursing note reviewed. Exam conducted with a chaperone present.   Constitutional:       General: She is not in acute distress.     Appearance: Normal appearance.   Cardiovascular:      Pulses:           Dorsalis pedis pulses are 1+ on the right side and 1+ on the left side.        Posterior tibial pulses are 1+ on the right side and 1+ on the left side.   Pulmonary:      Effort: Pulmonary effort is normal.   Musculoskeletal:         General: Swelling, tenderness and deformity present.      Right lower leg: Edema present.      Left lower leg: Edema present.      Right foot: Decreased range of motion. Deformity (hammertoe 2nd b/l) and bunion present.      Left foot: Decreased range of motion. Deformity and bunion present.      Comments: Pain digits and forefoot bilateral, very painful across the ball of the feet, hypersensitive 2nd digit bilateral    Feet:      Right foot:      Skin integrity: Ulcer (less maceration around ulcer limited to breakdown of the skin medial 2nd digit right due to hammertoe, decreased edema, no calor, very painful, no tracking) and callus present. No erythema.      Toenail Condition: Right toenails are abnormally thick and long. Fungal disease present.     Left foot:      Skin integrity: Ulcer (much improved pre ulcerative callus medial 2nd digit left, dry, no skin opening, less pain) and callus (Callus plantar and posterior left heel stable) present. No erythema.      Toenail Condition: Left toenails are " abnormally thick and long. Fungal disease present.  Skin:     Capillary Refill: Capillary refill takes 2 to 3 seconds.   Neurological:      General: No focal deficit present.      Mental Status: She is alert.      Comments: Neuritis pain forefoot with pain upon compression all common plantar digital nerves bilateral feet   Psychiatric:         Behavior: Behavior normal.                              Assessment:       1. Skin ulcer of second toe, right, limited to breakdown of skin    2. Hammer toes of both feet    3. Hallux abducto valgus, bilateral    4. Pre-ulcerative calluses - Left Foot    5. Edema of both lower extremities    6. Foot callus    7. Onychomycosis of toenail          Plan:         Explained 2nd digit left remains dry and much improved, this has allowed for some of the swelling to resolve and there is actually less pressure and more room between the 1st and 2nd digits.  IM able to touch this area with less pain today  Advised 2nd digit right foot is improved but still has moisture and a small superficial opening.  She needs to continue iodine to the 2nd digit right foot daily and applying silver alginate between the 1st and 2nd digits on both feet every day, even if swelling and pain are improved, treatment to keep the skin dry, prevent infection and padding to remove pressure is necessary on a daily basis indefinitely  Reviewed swelling causing pressure on digital deformities, hammertoes, bunions, arthritis  Reviewed pain associated with chronic swelling, most likely some underlying neuropathy.  We discussed neuritis/inflammation and nerves across the ball of both feet as she has pain upon compression of all webspaces on both feet.  Since ulcer is healed on the 2nd digit left foot she can soak the left foot only once daily in warm water and Epson salt, may help with some of her.  Explained this can not be done on the right foot until the ulcer is completely healed on the 2nd toe, continue to keep  dry.  Do not wrap toes, use silver alginate between  High risk for complications due to digital deformities and edema.   Wound care  Debridement ulcer 2nd digit right foot, pre ulcerative callus left foot.  Iodine, silver alginate, applied between the toes right foot, silver alginate applied between the toes left foot.    Instructed to perform this treatment daily  Callus/pre-ulcerative calluses debrided plantar right heel along with posterior aspect of both heels. Areas are stable without complications  Reviewed care and maintenance of nails, heel calluses  Check feet daily, contact office with any area of concern which has not improved in a few days  Patient was in understanding and agreement with treatment plan.  I counseled the patient on their conditions, implications and medical management.  Instructed patient/family to contact the office with any changes, questions, concerns, worsening of symptoms.   Total face to face time 30 minutes, exam, assessment, treatment, discussion, additional time for review of chart prior to and following appointment and visit documentation, consultation and coordination of care.    Follow up 4 weeks      This note was created using [employer] voice recognition software that occasionally misinterpreted phrases or words.

## 2023-09-07 ENCOUNTER — OFFICE VISIT (OUTPATIENT)
Dept: PODIATRY | Facility: CLINIC | Age: 88
End: 2023-09-07
Payer: MEDICARE

## 2023-09-07 VITALS
SYSTOLIC BLOOD PRESSURE: 102 MMHG | RESPIRATION RATE: 16 BRPM | HEIGHT: 64 IN | DIASTOLIC BLOOD PRESSURE: 52 MMHG | BODY MASS INDEX: 20.38 KG/M2 | WEIGHT: 119.38 LBS | HEART RATE: 71 BPM

## 2023-09-07 DIAGNOSIS — L84 PRE-ULCERATIVE CALLUSES: ICD-10-CM

## 2023-09-07 DIAGNOSIS — M20.41 HAMMER TOES OF BOTH FEET: ICD-10-CM

## 2023-09-07 DIAGNOSIS — M20.12 HALLUX ABDUCTO VALGUS, BILATERAL: ICD-10-CM

## 2023-09-07 DIAGNOSIS — L97.511 SKIN ULCER OF SECOND TOE, RIGHT, LIMITED TO BREAKDOWN OF SKIN: Primary | ICD-10-CM

## 2023-09-07 DIAGNOSIS — L84 FOOT CALLUS: ICD-10-CM

## 2023-09-07 DIAGNOSIS — M20.11 HALLUX ABDUCTO VALGUS, BILATERAL: ICD-10-CM

## 2023-09-07 DIAGNOSIS — R60.0 EDEMA OF BOTH LOWER EXTREMITIES: ICD-10-CM

## 2023-09-07 DIAGNOSIS — M20.42 HAMMER TOES OF BOTH FEET: ICD-10-CM

## 2023-09-07 PROCEDURE — 99214 PR OFFICE/OUTPT VISIT, EST, LEVL IV, 30-39 MIN: ICD-10-PCS | Mod: S$PBB,,, | Performed by: PODIATRIST

## 2023-09-07 PROCEDURE — 99999 PR PBB SHADOW E&M-EST. PATIENT-LVL IV: CPT | Mod: PBBFAC,,, | Performed by: PODIATRIST

## 2023-09-07 PROCEDURE — 99214 OFFICE O/P EST MOD 30 MIN: CPT | Mod: S$PBB,,, | Performed by: PODIATRIST

## 2023-09-07 PROCEDURE — 99214 OFFICE O/P EST MOD 30 MIN: CPT | Mod: PBBFAC | Performed by: PODIATRIST

## 2023-09-07 PROCEDURE — 99999 PR PBB SHADOW E&M-EST. PATIENT-LVL IV: ICD-10-PCS | Mod: PBBFAC,,, | Performed by: PODIATRIST

## 2023-09-07 RX ORDER — CLOBETASOL PROPIONATE 0.5 MG/G
OINTMENT TOPICAL 2 TIMES DAILY
Qty: 60 G | Refills: 1 | Status: SHIPPED | OUTPATIENT
Start: 2023-09-07 | End: 2024-01-11 | Stop reason: SDUPTHER

## 2023-09-07 RX ORDER — HYDROCHLOROTHIAZIDE 12.5 MG/1
12.5 TABLET ORAL
COMMUNITY
Start: 2023-07-28 | End: 2023-11-13 | Stop reason: ALTCHOICE

## 2023-09-11 NOTE — PROGRESS NOTES
Subjective:       Patient ID: Re Arciniega is a 89 y.o. female.    Chief Complaint: Follow-up, Hammer Toe, Foot Ulcer, and Toe Pain  Patient presents with her daughter for follow-up ulcers 2nd digit bilateral, follow-up hammertoes, bunions and preulcerative callus of the heels.  Patient relates keeping daya dry, applying soft makeup pad between the 1st and 2nd digits daily.  Patient relates these areas are painful because are so dry and would like to know what she can apply to them. Reports a lot less pain unless toes are touched, moved, pressure. Has had chronic swelling in her legs for many years.  Feet and legs are sensitive and painful all the time.    Past Medical History:   Diagnosis Date    Anemia     Constipation     Hypertension     Thyroid disease      Past Surgical History:   Procedure Laterality Date    CHOLECYSTECTOMY      HYSTERECTOMY       History reviewed. No pertinent family history.  Social History     Socioeconomic History    Marital status:    Tobacco Use    Smoking status: Never    Smokeless tobacco: Never   Substance and Sexual Activity    Alcohol use: No    Drug use: No    Sexual activity: Never       Current Outpatient Medications   Medication Sig Dispense Refill    cyanocobalamin 1,000 mcg/mL injection       docusate sodium (COLACE) 50 MG capsule Take by mouth 2 (two) times daily.      erythromycin (ROMYCIN) ophthalmic ointment       ferrous sulfate (FEOSOL) 325 mg (65 mg iron) Tab tablet ferrous sulfate 325 mg (65 mg iron) tablet      hydroCHLOROthiazide (HYDRODIURIL) 12.5 MG Tab Take 12.5 mg by mouth.      iron-vitamin C 100-250 mg, ICAR-C, 100-250 mg Tab       levothyroxine (SYNTHROID) 88 MCG tablet       losartan (COZAAR) 100 MG tablet Take 100 mg by mouth once daily.      polyethylene glycol (GLYCOLAX) 17 gram PwPk Take 17 g by mouth once daily. 30 packet 0    potassium chloride SA (K-DUR,KLOR-CON M) 10 MEQ tablet       telmisartan (MICARDIS) 80 MG Tab       clobetasol 0.05%  "(TEMOVATE) 0.05 % Oint Apply topically 2 (two) times daily. Twice daily to heels 60 g 1     No current facility-administered medications for this visit.     Review of patient's allergies indicates:  No Known Allergies    Review of Systems   Cardiovascular:  Positive for leg swelling.   Musculoskeletal:  Positive for gait problem.        Walker   All other systems reviewed and are negative.      Objective:      Vitals:    09/07/23 1319   BP: (!) 102/52   Pulse: 71   Resp: 16   Weight: 54.2 kg (119 lb 6.4 oz)   Height: 5' 4" (1.626 m)     Physical Exam  Vitals and nursing note reviewed. Exam conducted with a chaperone present.   Constitutional:       General: She is not in acute distress.     Appearance: Normal appearance.   Cardiovascular:      Pulses:           Dorsalis pedis pulses are 1+ on the right side and 1+ on the left side.        Posterior tibial pulses are 0 on the right side and 1+ on the left side.   Pulmonary:      Effort: Pulmonary effort is normal.   Musculoskeletal:         General: Swelling, tenderness and deformity present.      Right lower leg: Edema present.      Left lower leg: Edema present.      Right foot: Decreased range of motion. Deformity (hammertoe 2nd b/l) and bunion present.      Left foot: Decreased range of motion. Deformity and bunion present.      Comments: Pain digits and forefoot bilateral, very painful across the ball of the feet, hypersensitive 2nd digit bilateral    Feet:      Right foot:      Skin integrity: Ulcer (both previous ulcer limited to breakdown of the skin medial 2nd digit bilateral due to hammertoes are healed with raised, very tender dry callus) and callus present. No erythema.      Toenail Condition: Right toenails are abnormally thick and long. Fungal disease present.     Left foot:      Skin integrity: Ulcer (much improved pre ulcerative callus medial 2nd digit left, dry, no skin opening, less pain) and callus (raised callus plantar posterior heels) present. " No erythema.      Toenail Condition: Left toenails are abnormally thick and long. Fungal disease present.  Skin:     Capillary Refill: Capillary refill takes 2 to 3 seconds.   Neurological:      General: No focal deficit present.      Mental Status: She is alert.      Comments: Neuritis forefoot bilateral   Psychiatric:         Behavior: Behavior normal.                                          Assessment:       1. Skin ulcer of second toe, right, limited to breakdown of skin    2. Hallux abducto valgus, bilateral    3. Pre-ulcerative calluses - Left Foot    4. Hammer toes of both feet    5. Edema of both lower extremities    6. Foot callus          Plan:         CLOBETASOL OINTMENT  0.05% APPLY ONCE DAILY TO TOES, TWICE DAILY TO HEELS      Explained both 2nd digit ulcers are dry and much improved, this has allowed underlying tissue to heal  Advised patient due to hammertoes, arthritis of these joints, rubbing against the big toe, amount of swelling in her feet and ulcer she is had on these toes on and off for quite a while she is at high risk for recurrence  Patient understands she is not to apply any antibiotic ointment  Prescribed clobetasol and instructed how to apply once daily to the toes to help address inflammation and callus.  Explained apply a small amount before bed, make sure it is thoroughly absorbed into the skin and dry, allow it to dry all night before applying socks and shoes.  This area needs to be checked on a daily basis to make sure it is staying clean and dry  In the morning she is to continue any type of soft pad between the 1st and 2nd digits with a wide shoe  Instructed patient she can apply clobetasol twice daily to the heels  Reviewed elevation for swelling, pressure on digital deformities, hammertoes, bunions, arthritis  Reviewed pain associated with chronic swelling, most likely some underlying neuropathy.  We discussed neuritis/inflammation and nerves across the ball of both feet avoid  shoes which compression the front of the foot  High risk for complications due to digital deformities and edema.   Wound care  Debridement ulcer/pre ulcerative calluses 2nd digit bilateral, 2 x 2 gauze applied 1st webspace  Callus/pre-ulcerative calluses debrided plantar right heel along with posterior aspect of both heels. Areas are stable without complications  Reviewed care and maintenance of nails, skin, heel calluses  Check feet daily, contact office with any area of concern which has not improved in a few days  Patient was in understanding and agreement with treatment plan.  I counseled the patient on their conditions, implications and medical management.  Instructed patient/family to contact the office with any changes, questions, concerns, worsening of symptoms.   Total face to face time 30 minutes, exam, assessment, treatment, discussion, additional time for review of chart prior to and following appointment and visit documentation, consultation and coordination of care.    Follow up 4 weeks      This note was created using [employer] voice recognition software that occasionally misinterpreted phrases or words.

## 2023-11-10 ENCOUNTER — OFFICE VISIT (OUTPATIENT)
Dept: PODIATRY | Facility: CLINIC | Age: 88
End: 2023-11-10
Payer: MEDICARE

## 2023-11-10 VITALS
HEART RATE: 95 BPM | SYSTOLIC BLOOD PRESSURE: 156 MMHG | RESPIRATION RATE: 18 BRPM | DIASTOLIC BLOOD PRESSURE: 83 MMHG | BODY MASS INDEX: 20.63 KG/M2 | HEIGHT: 64 IN | WEIGHT: 120.81 LBS

## 2023-11-10 DIAGNOSIS — M20.12 HALLUX ABDUCTO VALGUS, BILATERAL: Primary | ICD-10-CM

## 2023-11-10 DIAGNOSIS — M20.42 HAMMER TOES OF BOTH FEET: ICD-10-CM

## 2023-11-10 DIAGNOSIS — M79.675 PAIN IN LEFT TOE(S): ICD-10-CM

## 2023-11-10 DIAGNOSIS — M20.41 HAMMER TOES OF BOTH FEET: ICD-10-CM

## 2023-11-10 DIAGNOSIS — M20.11 HALLUX ABDUCTO VALGUS, BILATERAL: Primary | ICD-10-CM

## 2023-11-10 DIAGNOSIS — L84 PRE-ULCERATIVE CALLUSES: ICD-10-CM

## 2023-11-10 DIAGNOSIS — R60.0 EDEMA OF BOTH LOWER EXTREMITIES: ICD-10-CM

## 2023-11-10 PROCEDURE — 99999 PR PBB SHADOW E&M-EST. PATIENT-LVL IV: CPT | Mod: PBBFAC,,, | Performed by: PODIATRIST

## 2023-11-10 PROCEDURE — 99214 OFFICE O/P EST MOD 30 MIN: CPT | Mod: PBBFAC | Performed by: PODIATRIST

## 2023-11-10 PROCEDURE — 99214 PR OFFICE/OUTPT VISIT, EST, LEVL IV, 30-39 MIN: ICD-10-PCS | Mod: S$PBB,,, | Performed by: PODIATRIST

## 2023-11-10 PROCEDURE — 99999 PR PBB SHADOW E&M-EST. PATIENT-LVL IV: ICD-10-PCS | Mod: PBBFAC,,, | Performed by: PODIATRIST

## 2023-11-10 PROCEDURE — 99214 OFFICE O/P EST MOD 30 MIN: CPT | Mod: S$PBB,,, | Performed by: PODIATRIST

## 2023-11-10 RX ORDER — HYDROCHLOROTHIAZIDE 25 MG/1
25 TABLET ORAL
Status: ON HOLD | COMMUNITY
Start: 2023-11-02 | End: 2024-01-31

## 2023-11-13 NOTE — PROGRESS NOTES
Subjective:       Patient ID: Re Arciniega is a 89 y.o. female.    Chief Complaint: Follow-up, Skin Ulcer, Callouses, Foot Swelling, and Toe Pain  Patient presents with her daughter for follow-up ulcers 2nd digit bilateral, follow-up hammertoes, bunions and preulcerative callus of the heels.  Patient and daughter request to have 2nd digit left foot numbed up before trimming callus as this causes lot of pain for patient.  Patient relates increased pain of this toe, pain level 10/10.  Relates 2nd digit right foot has improved, recently when removing the soft padding between the toes some of the corn came off.  Confirms she is applying clobetasol to the toes and heels.  Chronic swelling in legs for years.  Feet and legs are sensitive and painful all the time.    Past Medical History:   Diagnosis Date    Anemia     Constipation     Hypertension     Thyroid disease      Past Surgical History:   Procedure Laterality Date    CHOLECYSTECTOMY      HYSTERECTOMY       History reviewed. No pertinent family history.  Social History     Socioeconomic History    Marital status:    Tobacco Use    Smoking status: Never    Smokeless tobacco: Never   Substance and Sexual Activity    Alcohol use: No    Drug use: No    Sexual activity: Never       Current Outpatient Medications   Medication Sig Dispense Refill    cyanocobalamin 1,000 mcg/mL injection       docusate sodium (COLACE) 50 MG capsule Take by mouth 2 (two) times daily.      erythromycin (ROMYCIN) ophthalmic ointment       ferrous sulfate (FEOSOL) 325 mg (65 mg iron) Tab tablet ferrous sulfate 325 mg (65 mg iron) tablet      hydroCHLOROthiazide (HYDRODIURIL) 25 MG tablet Take 25 mg by mouth.      iron-vitamin C 100-250 mg, ICAR-C, 100-250 mg Tab       levothyroxine (SYNTHROID) 88 MCG tablet       losartan (COZAAR) 100 MG tablet Take 100 mg by mouth once daily.      polyethylene glycol (GLYCOLAX) 17 gram PwPk Take 17 g by mouth once daily. 30 packet 0    potassium  "chloride SA (K-DUR,KLOR-CON M) 10 MEQ tablet       telmisartan (MICARDIS) 80 MG Tab       clobetasol 0.05% (TEMOVATE) 0.05 % Oint Apply topically 2 (two) times daily. Twice daily to heels 60 g 1     No current facility-administered medications for this visit.     Review of patient's allergies indicates:  No Known Allergies    Review of Systems   Cardiovascular:  Positive for leg swelling.   Musculoskeletal:  Positive for gait problem.        Walker   All other systems reviewed and are negative.      Objective:      Vitals:    11/10/23 1408   BP: (!) 156/83   Pulse: 95   Resp: 18   Weight: 54.8 kg (120 lb 12.8 oz)   Height: 5' 4" (1.626 m)     Physical Exam  Vitals and nursing note reviewed. Exam conducted with a chaperone present.   Constitutional:       General: She is not in acute distress.     Appearance: Normal appearance.   Cardiovascular:      Pulses:           Dorsalis pedis pulses are 1+ on the right side and 1+ on the left side.        Posterior tibial pulses are 0 on the right side and 0 on the left side.   Pulmonary:      Effort: Pulmonary effort is normal.   Musculoskeletal:         General: Tenderness and deformity present.      Right lower leg: Edema present.      Left lower leg: Edema present.      Right foot: Decreased range of motion. Deformity (hammertoe 2nd b/l) and bunion present.      Left foot: Decreased range of motion. Deformity and bunion present.      Comments: Pain 2nd digit left   Feet:      Right foot:      Skin integrity: Callus (Preulcerative callus medial 2nd digit right, improved, less callus, no pain) present. No erythema.      Toenail Condition: Right toenails are abnormally thick and long. Fungal disease present.     Left foot:      Skin integrity: Ulcer (Tender, thick, raised, dry pre ulcerative callus medial 2nd digit left) and callus (raised callus plantar posterior heels) present. No erythema.      Toenail Condition: Left toenails are abnormally thick and long. Fungal disease " present.  Skin:     Capillary Refill: Capillary refill takes 2 to 3 seconds.   Neurological:      General: No focal deficit present.      Mental Status: She is alert.      Comments: Neuritis forefoot bilateral   Psychiatric:         Behavior: Behavior normal.         Thought Content: Thought content normal.                                      Assessment:       1. Hallux abducto valgus, bilateral    2. Hammer toes of both feet    3. Pain in left toe(s)    4. Pre-ulcerative calluses - Left Foot    5. Edema of both lower extremities          Plan:         2 ML 1 CC LIDOCAINE PLAIN 2ND DIGIT LEFT  CONTINUE CLOBETASOL OINTMENT  0.05% APPLY ONCE DAILY TO TOES, TWICE DAILY TO HEELS    Reviewed injection to anesthetize toe 2nd digit left foot with patient and daughter at length, temporary numbing of the area to remove preulcerative callus, will last for a few hours, injection is very sensitive in the toe, having this done to her more thoroughly remove this area can be very beneficial as long as she continues to apply clobetasol daily and keep soft cushion between the toes  Continue clobetasol to 2nd digit right and the back of the heels daily it has provide some moisture without keeping the areas  Patient was in understanding and agreement with treatment plan and did want 2nd digit left foot numbed up  2 cc 1% lidocaine administered under preulcerative callus 2nd digit left foot  More thorough debridement removing all callus tissue, no bleeding, skin break  Gauze and light Coban applied, can remove this evening or in the morning and resume her treatment  2nd digit right was debrided  Reviewed potential risks and complications regarding digital pre ulcerative calluses as she is had ulcers in both of these locations in the past  Reviewed care and maintenance of calluses, skin, nails  Preulcerative calluses debrided posterior heels bilateral with no change or complication at this time  Reviewed elevation for swelling,  pressure on digital deformities, hammertoes, bunions, arthritis  Reviewed pain associated with chronic swelling, most likely some underlying neuropathy  Reviewed neuritis/inflammation and nerves across the ball of both feet, chronic edema contributes, avoid shoes which compression the front of the foot  High risk for complications due to digital deformities and edema.   Wound care  Debridement ulcer/pre ulcerative calluses 2nd digit bilateral, 2 x 2 gauze applied 1st webspace  Reviewed care and maintenance of nails, thickness reduced  Check feet daily, contact office with any area of concern which has not improved in a few days  Patient was in understanding and agreement with treatment plan.  I counseled the patient on their conditions, implications and medical management.  Instructed patient/family to contact the office with any changes, questions, concerns, worsening of symptoms.   Total face to face time 30 minutes, exam, assessment, treatment, discussion, additional time for review of chart prior to and following appointment and visit documentation, consultation and coordination of care.    Follow up 6 weeks      This note was created using [employer] voice recognition software that occasionally misinterpreted phrases or words.

## 2023-11-15 ENCOUNTER — HOSPITAL ENCOUNTER (OUTPATIENT)
Dept: RADIOLOGY | Facility: HOSPITAL | Age: 88
Discharge: HOME | End: 2023-11-15
Attending: FAMILY MEDICINE
Payer: MEDICARE

## 2023-11-15 DIAGNOSIS — R10.9 ABDOMINAL PAIN: ICD-10-CM

## 2023-11-15 DIAGNOSIS — D64.9 ANEMIA: ICD-10-CM

## 2023-11-15 PROCEDURE — A9698 NON-RAD CONTRAST MATERIALNOC: HCPCS | Performed by: FAMILY MEDICINE

## 2023-11-15 PROCEDURE — 25500020 PHARM REV CODE 255: Performed by: FAMILY MEDICINE

## 2023-11-15 RX ADMIN — IOHEXOL 750 ML: 9 SOLUTION ORAL at 03:11

## 2024-01-11 ENCOUNTER — OFFICE VISIT (OUTPATIENT)
Dept: PODIATRY | Facility: CLINIC | Age: 89
End: 2024-01-11
Payer: MEDICARE

## 2024-01-11 VITALS
BODY MASS INDEX: 20.75 KG/M2 | HEART RATE: 90 BPM | TEMPERATURE: 98 F | WEIGHT: 120.88 LBS | SYSTOLIC BLOOD PRESSURE: 150 MMHG | DIASTOLIC BLOOD PRESSURE: 79 MMHG

## 2024-01-11 DIAGNOSIS — R60.0 EDEMA OF BOTH LOWER EXTREMITIES: ICD-10-CM

## 2024-01-11 DIAGNOSIS — M20.42 HAMMER TOES OF BOTH FEET: Primary | ICD-10-CM

## 2024-01-11 DIAGNOSIS — M20.11 HALLUX ABDUCTO VALGUS, BILATERAL: ICD-10-CM

## 2024-01-11 DIAGNOSIS — M20.41 HAMMER TOES OF BOTH FEET: Primary | ICD-10-CM

## 2024-01-11 DIAGNOSIS — L84 FOOT CALLUS: ICD-10-CM

## 2024-01-11 DIAGNOSIS — M79.675 PAIN IN LEFT TOE(S): ICD-10-CM

## 2024-01-11 DIAGNOSIS — M20.12 HALLUX ABDUCTO VALGUS, BILATERAL: ICD-10-CM

## 2024-01-11 DIAGNOSIS — L84 PRE-ULCERATIVE CALLUSES: ICD-10-CM

## 2024-01-11 PROCEDURE — 99999 PR PBB SHADOW E&M-EST. PATIENT-LVL IV: CPT | Mod: PBBFAC,,, | Performed by: PODIATRIST

## 2024-01-11 PROCEDURE — 99214 OFFICE O/P EST MOD 30 MIN: CPT | Mod: S$PBB,,, | Performed by: PODIATRIST

## 2024-01-11 PROCEDURE — 99214 OFFICE O/P EST MOD 30 MIN: CPT | Mod: PBBFAC | Performed by: PODIATRIST

## 2024-01-11 RX ORDER — CLOBETASOL PROPIONATE 0.5 MG/G
OINTMENT TOPICAL 2 TIMES DAILY
Qty: 60 G | Refills: 1 | Status: ON HOLD | OUTPATIENT
Start: 2024-01-11 | End: 2024-01-31

## 2024-01-14 NOTE — PROGRESS NOTES
Subjective:       Patient ID: Re Arciniega is a 89 y.o. female.    Chief Complaint: Follow-up and Toe Pain  Patient presents with her daughter for follow-up ulcers 2nd digit L>R.  Patient relates she did receive a lot of relief after removal of callus 2nd digit left foot.  Confirms applying clobetasol to preulcerative calluses on the 2nd digits daily and she did start applying it to the calluses on her heels daily.  Pain level down from 10 to 7/10.  Is using a soft thin circular make up pad between the 1st and 2nd toes which she states works much better than wound care dressing or gauze.      Past Medical History:   Diagnosis Date    Anemia     Constipation     Hypertension     Thyroid disease      Past Surgical History:   Procedure Laterality Date    CHOLECYSTECTOMY      HYSTERECTOMY       History reviewed. No pertinent family history.  Social History     Socioeconomic History    Marital status:    Tobacco Use    Smoking status: Never    Smokeless tobacco: Never   Substance and Sexual Activity    Alcohol use: No    Drug use: No    Sexual activity: Never       Current Outpatient Medications   Medication Sig Dispense Refill    cyanocobalamin 1,000 mcg/mL injection       docusate sodium (COLACE) 50 MG capsule Take by mouth 2 (two) times daily.      erythromycin (ROMYCIN) ophthalmic ointment       ferrous sulfate (FEOSOL) 325 mg (65 mg iron) Tab tablet ferrous sulfate 325 mg (65 mg iron) tablet      hydroCHLOROthiazide (HYDRODIURIL) 25 MG tablet Take 25 mg by mouth.      iron-vitamin C 100-250 mg, ICAR-C, 100-250 mg Tab       levothyroxine (SYNTHROID) 88 MCG tablet       losartan (COZAAR) 100 MG tablet Take 100 mg by mouth once daily.      polyethylene glycol (GLYCOLAX) 17 gram PwPk Take 17 g by mouth once daily. 30 packet 0    potassium chloride SA (K-DUR,KLOR-CON M) 10 MEQ tablet       telmisartan (MICARDIS) 80 MG Tab       clobetasol 0.05% (TEMOVATE) 0.05 % Oint Apply topically 2 (two) times daily. Once daily  to heels 60 g 1     No current facility-administered medications for this visit.     Review of patient's allergies indicates:  No Known Allergies    Review of Systems   Cardiovascular:  Positive for leg swelling.   Musculoskeletal:  Positive for gait problem.        Walker   All other systems reviewed and are negative.      Objective:      Vitals:    01/11/24 1454   BP: (!) 150/79   BP Location: Right arm   Patient Position: Sitting   BP Method: Small (Automatic)   Pulse: 90   Temp: 98.3 °F (36.8 °C)   TempSrc: Oral   Weight: 54.8 kg (120 lb 14.4 oz)     Physical Exam  Vitals and nursing note reviewed. Exam conducted with a chaperone present.   Constitutional:       General: She is not in acute distress.     Appearance: Normal appearance.   Cardiovascular:      Pulses:           Dorsalis pedis pulses are 1+ on the right side and 1+ on the left side.        Posterior tibial pulses are 0 on the right side and 0 on the left side.   Pulmonary:      Effort: Pulmonary effort is normal.   Musculoskeletal:         General: Tenderness and deformity present.      Right lower leg: Edema present.      Left lower leg: Edema present.      Right foot: Decreased range of motion. Deformity (hammertoe 2nd b/l) and bunion present.      Left foot: Decreased range of motion. Deformity and bunion present.      Comments: Pain 2nd digit left   Feet:      Right foot:      Skin integrity: Callus (Preulcerative callus medial 2nd digit right much improved, almost resolved) present. No erythema.      Toenail Condition: Right toenails are abnormally thick and long. Fungal disease present.     Left foot:      Skin integrity: Ulcer (Tender, dry pre ulcerative callus medial 2nd digit left, improved, not as thicked/raised) and callus (raised callus plantar posterior heels) present. No erythema.      Toenail Condition: Left toenails are abnormally thick and long. Fungal disease present.  Skin:     Capillary Refill: Capillary refill takes 2 to 3  seconds.   Neurological:      General: No focal deficit present.      Mental Status: She is alert.      Comments: Neuritis forefoot bilateral   Psychiatric:         Behavior: Behavior normal.         Thought Content: Thought content normal.                        Assessment:       1. Hammer toes of both feet    2. Pain in left toe(s)    3. Hallux abducto valgus, bilateral    4. Edema of both lower extremities    5. Pre-ulcerative calluses - Left Foot    6. Foot callus          Plan:           REFILL CLOBETASOL OINTMENT  0.05% APPLY ONCE DAILY     Advised patient there has been significant improvement with steroid cream, long ulcer/pre ulcerative callus 2nd digit right foot has just about resolved.  Continue to apply steroid once daily in this area, make sure it does not develop any moisture  Advised patient there is significant improvement ulcer/preulcerative callus 2nd digit left foot.  Some callus has recurred but it is a lot less raised/thick than previous visit.  Explained it is crucial she continues to treat this area daily to prevent recurrence and hopefully continued daily treatment will be successful in this area as well to completely resolve.  Advised patient it is tighter between the 1st and 2nd digits on the left foot and she does need to continue padding between the 1st and 2nd toes on both feet on a daily basis weather painful or not  Padding she is using is soft and works very well  Debridement pre ulcerative callus medial 2nd digit left, cleansed with wound , patient's own padding was applied back to this area  Wound care  Very light debridement of pre ulcerative callus 2nd digit right foot, cleansed with wound , patient's padding applied between toes  More thorough debridement a of chronic raised in very thick calluses posterior aspect of both heels today.  Advised patient she can continue to apply steroid cream twice daily to the heels, only once daily between the toes  Again make sure  she thoroughly cleaned and dries well between the 1st and 2nd toes, if she notices any moisture, any white tissue she is to apply iodine/Betadine once daily until that is back to normal skin color  Patient confirms she understands keeping these areas moist will cause them to become an open ulcer again  She patient confirms she understands she is at risk for recurrence due to swelling, arthritis stiffness of the toes  Reviewed care and maintenance of nails, thickness reduced  Check feet daily, contact office with any area of concern which has not improved in a few days  Patient was in understanding and agreement with treatment plan.  I counseled the patient on their conditions, implications and medical management.  Instructed patient/family to contact the office with any changes, questions, concerns, worsening of symptoms.   Total face to face time 30 minutes, exam, assessment, treatment, discussion, additional time for review of chart prior to and following appointment and visit documentation, consultation and coordination of care.    Follow up 6 weeks      This note was created using [employer] voice recognition software that occasionally misinterpreted phrases or words.

## 2024-01-27 ENCOUNTER — HOSPITAL ENCOUNTER (INPATIENT)
Facility: HOSPITAL | Age: 89
LOS: 3 days | Discharge: NURSING, REHAB OR LONG-TERM CARE | DRG: 280 | End: 2024-01-31
Attending: EMERGENCY MEDICINE | Admitting: STUDENT IN AN ORGANIZED HEALTH CARE EDUCATION/TRAINING PROGRAM
Payer: MEDICARE

## 2024-01-27 DIAGNOSIS — I50.9 CONGESTIVE HEART FAILURE, UNSPECIFIED HF CHRONICITY, UNSPECIFIED HEART FAILURE TYPE: Primary | ICD-10-CM

## 2024-01-27 DIAGNOSIS — R06.02 SHORTNESS OF BREATH: ICD-10-CM

## 2024-01-27 DIAGNOSIS — R07.9 CHEST PAIN: ICD-10-CM

## 2024-01-27 DIAGNOSIS — R79.89 ELEVATED TROPONIN: ICD-10-CM

## 2024-01-27 PROBLEM — E03.9 HYPOTHYROIDISM: Status: ACTIVE | Noted: 2024-01-27

## 2024-01-27 PROBLEM — I21.4 NSTEMI (NON-ST ELEVATED MYOCARDIAL INFARCTION): Status: ACTIVE | Noted: 2024-01-27

## 2024-01-27 PROBLEM — R74.01 TRANSAMINITIS: Status: ACTIVE | Noted: 2024-01-27

## 2024-01-27 PROBLEM — J96.01 ACUTE RESPIRATORY FAILURE WITH HYPOXIA: Status: ACTIVE | Noted: 2024-01-27

## 2024-01-27 PROBLEM — Z71.89 ADVANCED CARE PLANNING/COUNSELING DISCUSSION: Status: ACTIVE | Noted: 2024-01-27

## 2024-01-27 PROBLEM — I10 HYPERTENSION: Status: ACTIVE | Noted: 2024-01-27

## 2024-01-27 PROBLEM — I50.20 HEART FAILURE WITH REDUCED EJECTION FRACTION: Status: ACTIVE | Noted: 2024-01-27

## 2024-01-27 LAB
ALBUMIN SERPL BCP-MCNC: 4.2 G/DL (ref 3.5–5.2)
ALP SERPL-CCNC: 66 U/L (ref 55–135)
ALT SERPL W/O P-5'-P-CCNC: 46 U/L (ref 10–44)
ANION GAP SERPL CALC-SCNC: 11 MMOL/L (ref 8–16)
AST SERPL-CCNC: 41 U/L (ref 10–40)
BASOPHILS # BLD AUTO: 0.03 K/UL (ref 0–0.2)
BASOPHILS NFR BLD: 0.8 % (ref 0–1.9)
BILIRUB SERPL-MCNC: 0.6 MG/DL (ref 0.1–1)
BNP SERPL-MCNC: 646 PG/ML (ref 0–99)
BUN SERPL-MCNC: 27 MG/DL (ref 10–30)
CALCIUM SERPL-MCNC: 9.3 MG/DL (ref 8.7–10.5)
CHLORIDE SERPL-SCNC: 107 MMOL/L (ref 95–110)
CHOLEST SERPL-MCNC: 193 MG/DL (ref 120–199)
CHOLEST/HDLC SERPL: 2.4 {RATIO} (ref 2–5)
CO2 SERPL-SCNC: 24 MMOL/L (ref 23–29)
CREAT SERPL-MCNC: 1.1 MG/DL (ref 0.5–1.4)
DIFFERENTIAL METHOD BLD: ABNORMAL
EOSINOPHIL # BLD AUTO: 0.1 K/UL (ref 0–0.5)
EOSINOPHIL NFR BLD: 3.1 % (ref 0–8)
ERYTHROCYTE [DISTWIDTH] IN BLOOD BY AUTOMATED COUNT: 13.5 % (ref 11.5–14.5)
EST. GFR  (NO RACE VARIABLE): 46.9 ML/MIN/1.73 M^2
ESTIMATED AVG GLUCOSE: 103 MG/DL (ref 68–131)
GLUCOSE SERPL-MCNC: 109 MG/DL (ref 70–110)
HBA1C MFR BLD: 5.2 % (ref 4–5.6)
HCT VFR BLD AUTO: 34.2 % (ref 37–48.5)
HCV AB SERPL QL IA: NORMAL
HDLC SERPL-MCNC: 81 MG/DL (ref 40–75)
HDLC SERPL: 42 % (ref 20–50)
HGB BLD-MCNC: 11 G/DL (ref 12–16)
HIV 1+2 AB+HIV1 P24 AG SERPL QL IA: NORMAL
IMM GRANULOCYTES # BLD AUTO: 0 K/UL (ref 0–0.04)
IMM GRANULOCYTES NFR BLD AUTO: 0 % (ref 0–0.5)
INFLUENZA A, MOLECULAR: NEGATIVE
INFLUENZA B, MOLECULAR: NEGATIVE
LDLC SERPL CALC-MCNC: 103.2 MG/DL (ref 63–159)
LYMPHOCYTES # BLD AUTO: 1.2 K/UL (ref 1–4.8)
LYMPHOCYTES NFR BLD: 31.7 % (ref 18–48)
MCH RBC QN AUTO: 31.7 PG (ref 27–31)
MCHC RBC AUTO-ENTMCNC: 32.2 G/DL (ref 32–36)
MCV RBC AUTO: 99 FL (ref 82–98)
MONOCYTES # BLD AUTO: 0.3 K/UL (ref 0.3–1)
MONOCYTES NFR BLD: 8.2 % (ref 4–15)
NEUTROPHILS # BLD AUTO: 2.2 K/UL (ref 1.8–7.7)
NEUTROPHILS NFR BLD: 56.2 % (ref 38–73)
NONHDLC SERPL-MCNC: 112 MG/DL
NRBC BLD-RTO: 0 /100 WBC
PLATELET # BLD AUTO: 151 K/UL (ref 150–450)
PMV BLD AUTO: 10.6 FL (ref 9.2–12.9)
POTASSIUM SERPL-SCNC: 4.6 MMOL/L (ref 3.5–5.1)
PROT SERPL-MCNC: 7.6 G/DL (ref 6–8.4)
RBC # BLD AUTO: 3.47 M/UL (ref 4–5.4)
SARS-COV-2 RDRP RESP QL NAA+PROBE: NEGATIVE
SODIUM SERPL-SCNC: 142 MMOL/L (ref 136–145)
SPECIMEN SOURCE: NORMAL
TRIGL SERPL-MCNC: 44 MG/DL (ref 30–150)
TROPONIN I SERPL DL<=0.01 NG/ML-MCNC: 0.04 NG/ML (ref 0–0.03)
TROPONIN I SERPL DL<=0.01 NG/ML-MCNC: 0.05 NG/ML (ref 0–0.03)
TSH SERPL DL<=0.005 MIU/L-ACNC: 2.7 UIU/ML (ref 0.4–4)
WBC # BLD AUTO: 3.91 K/UL (ref 3.9–12.7)

## 2024-01-27 PROCEDURE — 86803 HEPATITIS C AB TEST: CPT | Performed by: EMERGENCY MEDICINE

## 2024-01-27 PROCEDURE — 93005 ELECTROCARDIOGRAM TRACING: CPT

## 2024-01-27 PROCEDURE — G0378 HOSPITAL OBSERVATION PER HR: HCPCS

## 2024-01-27 PROCEDURE — 25000003 PHARM REV CODE 250: Performed by: STUDENT IN AN ORGANIZED HEALTH CARE EDUCATION/TRAINING PROGRAM

## 2024-01-27 PROCEDURE — U0002 COVID-19 LAB TEST NON-CDC: HCPCS | Performed by: EMERGENCY MEDICINE

## 2024-01-27 PROCEDURE — 94660 CPAP INITIATION&MGMT: CPT

## 2024-01-27 PROCEDURE — 83880 ASSAY OF NATRIURETIC PEPTIDE: CPT | Performed by: EMERGENCY MEDICINE

## 2024-01-27 PROCEDURE — 99223 1ST HOSP IP/OBS HIGH 75: CPT | Mod: AI,,, | Performed by: STUDENT IN AN ORGANIZED HEALTH CARE EDUCATION/TRAINING PROGRAM

## 2024-01-27 PROCEDURE — 84484 ASSAY OF TROPONIN QUANT: CPT | Mod: 91 | Performed by: EMERGENCY MEDICINE

## 2024-01-27 PROCEDURE — 83036 HEMOGLOBIN GLYCOSYLATED A1C: CPT | Performed by: STUDENT IN AN ORGANIZED HEALTH CARE EDUCATION/TRAINING PROGRAM

## 2024-01-27 PROCEDURE — 27000221 HC OXYGEN, UP TO 24 HOURS

## 2024-01-27 PROCEDURE — 99291 CRITICAL CARE FIRST HOUR: CPT

## 2024-01-27 PROCEDURE — 96374 THER/PROPH/DIAG INJ IV PUSH: CPT

## 2024-01-27 PROCEDURE — 63600175 PHARM REV CODE 636 W HCPCS: Performed by: EMERGENCY MEDICINE

## 2024-01-27 PROCEDURE — 80061 LIPID PANEL: CPT | Performed by: STUDENT IN AN ORGANIZED HEALTH CARE EDUCATION/TRAINING PROGRAM

## 2024-01-27 PROCEDURE — 27100171 HC OXYGEN HIGH FLOW UP TO 24 HOURS

## 2024-01-27 PROCEDURE — 27000190 HC CPAP FULL FACE MASK W/VALVE

## 2024-01-27 PROCEDURE — 25000242 PHARM REV CODE 250 ALT 637 W/ HCPCS: Performed by: EMERGENCY MEDICINE

## 2024-01-27 PROCEDURE — 71045 X-RAY EXAM CHEST 1 VIEW: CPT | Mod: 26,,, | Performed by: RADIOLOGY

## 2024-01-27 PROCEDURE — 93010 ELECTROCARDIOGRAM REPORT: CPT | Mod: ,,, | Performed by: INTERNAL MEDICINE

## 2024-01-27 PROCEDURE — 94640 AIRWAY INHALATION TREATMENT: CPT

## 2024-01-27 PROCEDURE — 85025 COMPLETE CBC W/AUTO DIFF WBC: CPT | Performed by: EMERGENCY MEDICINE

## 2024-01-27 PROCEDURE — 87502 INFLUENZA DNA AMP PROBE: CPT

## 2024-01-27 PROCEDURE — 87389 HIV-1 AG W/HIV-1&-2 AB AG IA: CPT | Performed by: EMERGENCY MEDICINE

## 2024-01-27 PROCEDURE — 80053 COMPREHEN METABOLIC PANEL: CPT | Performed by: EMERGENCY MEDICINE

## 2024-01-27 PROCEDURE — 99900035 HC TECH TIME PER 15 MIN (STAT)

## 2024-01-27 PROCEDURE — 71045 X-RAY EXAM CHEST 1 VIEW: CPT | Mod: TC

## 2024-01-27 PROCEDURE — 84484 ASSAY OF TROPONIN QUANT: CPT | Performed by: EMERGENCY MEDICINE

## 2024-01-27 PROCEDURE — 84443 ASSAY THYROID STIM HORMONE: CPT | Performed by: STUDENT IN AN ORGANIZED HEALTH CARE EDUCATION/TRAINING PROGRAM

## 2024-01-27 PROCEDURE — 87502 INFLUENZA DNA AMP PROBE: CPT | Performed by: EMERGENCY MEDICINE

## 2024-01-27 RX ORDER — LEVOTHYROXINE SODIUM 88 UG/1
88 TABLET ORAL
Status: DISCONTINUED | OUTPATIENT
Start: 2024-01-28 | End: 2024-01-31 | Stop reason: HOSPADM

## 2024-01-27 RX ORDER — SODIUM CHLORIDE 0.9 % (FLUSH) 0.9 %
10 SYRINGE (ML) INJECTION EVERY 12 HOURS PRN
Status: DISCONTINUED | OUTPATIENT
Start: 2024-01-27 | End: 2024-01-31 | Stop reason: HOSPADM

## 2024-01-27 RX ORDER — IBUPROFEN 200 MG
16 TABLET ORAL
Status: DISCONTINUED | OUTPATIENT
Start: 2024-01-27 | End: 2024-01-31 | Stop reason: HOSPADM

## 2024-01-27 RX ORDER — DOCUSATE SODIUM 100 MG/1
200 CAPSULE, LIQUID FILLED ORAL DAILY
Status: DISCONTINUED | OUTPATIENT
Start: 2024-01-27 | End: 2024-01-29

## 2024-01-27 RX ORDER — IBUPROFEN 200 MG
24 TABLET ORAL
Status: DISCONTINUED | OUTPATIENT
Start: 2024-01-27 | End: 2024-01-31 | Stop reason: HOSPADM

## 2024-01-27 RX ORDER — PROCHLORPERAZINE EDISYLATE 5 MG/ML
5 INJECTION INTRAMUSCULAR; INTRAVENOUS EVERY 6 HOURS PRN
Status: DISCONTINUED | OUTPATIENT
Start: 2024-01-27 | End: 2024-01-31 | Stop reason: HOSPADM

## 2024-01-27 RX ORDER — IPRATROPIUM BROMIDE AND ALBUTEROL SULFATE 2.5; .5 MG/3ML; MG/3ML
3 SOLUTION RESPIRATORY (INHALATION) EVERY 6 HOURS PRN
Status: DISCONTINUED | OUTPATIENT
Start: 2024-01-27 | End: 2024-01-29

## 2024-01-27 RX ORDER — NAPROXEN SODIUM 220 MG/1
81 TABLET, FILM COATED ORAL DAILY
Status: DISCONTINUED | OUTPATIENT
Start: 2024-01-27 | End: 2024-01-31 | Stop reason: HOSPADM

## 2024-01-27 RX ORDER — ALUMINUM HYDROXIDE, MAGNESIUM HYDROXIDE, AND SIMETHICONE 1200; 120; 1200 MG/30ML; MG/30ML; MG/30ML
30 SUSPENSION ORAL 4 TIMES DAILY PRN
Status: DISCONTINUED | OUTPATIENT
Start: 2024-01-27 | End: 2024-01-31 | Stop reason: HOSPADM

## 2024-01-27 RX ORDER — ONDANSETRON HYDROCHLORIDE 2 MG/ML
4 INJECTION, SOLUTION INTRAVENOUS EVERY 8 HOURS PRN
Status: DISCONTINUED | OUTPATIENT
Start: 2024-01-27 | End: 2024-01-31 | Stop reason: HOSPADM

## 2024-01-27 RX ORDER — IPRATROPIUM BROMIDE AND ALBUTEROL SULFATE 2.5; .5 MG/3ML; MG/3ML
3 SOLUTION RESPIRATORY (INHALATION)
Status: COMPLETED | OUTPATIENT
Start: 2024-01-27 | End: 2024-01-27

## 2024-01-27 RX ORDER — NALOXONE HCL 0.4 MG/ML
0.02 VIAL (ML) INJECTION
Status: DISCONTINUED | OUTPATIENT
Start: 2024-01-27 | End: 2024-01-31 | Stop reason: HOSPADM

## 2024-01-27 RX ORDER — FUROSEMIDE 10 MG/ML
40 INJECTION INTRAMUSCULAR; INTRAVENOUS
Status: COMPLETED | OUTPATIENT
Start: 2024-01-27 | End: 2024-01-27

## 2024-01-27 RX ORDER — ENOXAPARIN SODIUM 100 MG/ML
30 INJECTION SUBCUTANEOUS EVERY 24 HOURS
Status: DISCONTINUED | OUTPATIENT
Start: 2024-01-28 | End: 2024-01-31 | Stop reason: HOSPADM

## 2024-01-27 RX ORDER — GLUCAGON 1 MG
1 KIT INJECTION
Status: DISCONTINUED | OUTPATIENT
Start: 2024-01-27 | End: 2024-01-31 | Stop reason: HOSPADM

## 2024-01-27 RX ADMIN — ASPIRIN 81 MG: 81 TABLET, CHEWABLE ORAL at 09:01

## 2024-01-27 RX ADMIN — IPRATROPIUM BROMIDE AND ALBUTEROL SULFATE 3 ML: .5; 2.5 SOLUTION RESPIRATORY (INHALATION) at 07:01

## 2024-01-27 RX ADMIN — DOCUSATE SODIUM 200 MG: 100 CAPSULE ORAL at 03:01

## 2024-01-27 RX ADMIN — FUROSEMIDE 40 MG: 10 INJECTION, SOLUTION INTRAVENOUS at 08:01

## 2024-01-27 NOTE — ED PROVIDER NOTES
Encounter Date: 1/27/2024       History     Chief Complaint   Patient presents with    Shortness of Breath     89-year-old female, here from home via EMS complaining of shortness of breath.  Patient is accompanied by family member.  Patient states her shortness of breath started earlier this morning, perhaps around 4:00 a.m..  She denies any history of similar shortness of breath in the past.  Denies CHF, denies COPD.  States history of asthma.  She takes medications for hypertension and thyroid disease.  Patient states she has been taking her blood pressure medicines as prescribed.  Patient does admit that her legs have been swelling for the last six months.  No fever, no chills, no chest pain or palpitations.  Symptoms are worse with exertion.  Patient states she took her blood pressure medications last night as prescribed and has not missed any doses, but her pressure is elevated here upon arrival.  O2 saturation on room air 88%.      Review of patient's allergies indicates:  No Known Allergies  Past Medical History:   Diagnosis Date    Anemia     Constipation     Hypertension     Thyroid disease      Past Surgical History:   Procedure Laterality Date    CHOLECYSTECTOMY      HYSTERECTOMY       No family history on file.  Social History     Tobacco Use    Smoking status: Never    Smokeless tobacco: Never   Substance Use Topics    Alcohol use: No    Drug use: No     Review of Systems   Constitutional:  Negative for chills and fever.   HENT:  Negative for congestion, rhinorrhea and sore throat.    Eyes: Negative.    Respiratory:  Positive for shortness of breath. Negative for cough, wheezing and stridor.    Cardiovascular:  Positive for leg swelling.   Gastrointestinal:  Negative for abdominal pain, constipation, diarrhea and nausea.   Genitourinary: Negative.    Musculoskeletal: Negative.    Neurological: Negative.    Psychiatric/Behavioral: Negative.         Physical Exam     Initial Vitals   BP Pulse Resp Temp  SpO2   01/27/24 0710 01/27/24 0704 01/27/24 0704 01/27/24 0704 01/27/24 0704   (!) 185/107 104 (!) 28 98.7 °F (37.1 °C) (!) 88 %      MAP       --                Physical Exam    Nursing note and vitals reviewed.  Constitutional: She appears well-developed and well-nourished. No distress.   Elderly female, looks somewhat younger than stated age, obvious increased work of breathing and mild to moderate respiratory distress.   HENT:   Head: Normocephalic and atraumatic.   Nose: Nose normal.   Mouth/Throat: Oropharynx is clear and moist. No oropharyngeal exudate.   Eyes: Conjunctivae and EOM are normal. Pupils are equal, round, and reactive to light.   Neck: Neck supple. JVD present.   Normal range of motion.  Cardiovascular:  Normal rate, regular rhythm, normal heart sounds and intact distal pulses.           No murmur heard.  Pulmonary/Chest: No stridor. She is in respiratory distress. She has no wheezes. She has no rhonchi. She has rales.   Abdominal: Abdomen is soft. Bowel sounds are normal. She exhibits no distension. There is no abdominal tenderness.   Musculoskeletal:         General: Edema present. No tenderness. Normal range of motion.      Cervical back: Normal range of motion and neck supple.      Comments: Both lower extremities edematous with 2+ pitting edema almost to the knees.     Neurological: She is alert and oriented to person, place, and time. She has normal strength. No cranial nerve deficit or sensory deficit. GCS score is 15. GCS eye subscore is 4. GCS verbal subscore is 5. GCS motor subscore is 6.   Skin: Skin is warm and dry. Capillary refill takes less than 2 seconds. No rash noted. No erythema.   Psychiatric: She has a normal mood and affect. Her behavior is normal.   Anxious, but otherwise normal psychiatric exam         ED Course   Critical Care    Date/Time: 1/27/2024 5:50 PM    Performed by: Jose Luis Sibley MD  Authorized by: Yg Talavera MD  Direct patient critical care time: 27  minutes  Additional history critical care time: 6 minutes  Ordering / reviewing critical care time: 7 minutes  Documentation critical care time: 16 minutes  Consulting other physicians critical care time: 7 minutes  Total critical care time (exclusive of procedural time) : 63 minutes  Critical care time was exclusive of separately billable procedures and treating other patients and teaching time.  Critical care was necessary to treat or prevent imminent or life-threatening deterioration of the following conditions: respiratory failure.  Critical care was time spent personally by me on the following activities: development of treatment plan with patient or surrogate, discussions with consultants, discussions with primary provider, gastric intubation, interpretation of cardiac output measurements, evaluation of patient's response to treatment, examination of patient, obtaining history from patient or surrogate, ordering and performing treatments and interventions, ordering and review of laboratory studies, ordering and review of radiographic studies, pulse oximetry, re-evaluation of patient's condition and review of old charts.        Labs Reviewed   CBC W/ AUTO DIFFERENTIAL - Abnormal; Notable for the following components:       Result Value    RBC 3.47 (*)     Hemoglobin 11.0 (*)     Hematocrit 34.2 (*)     MCV 99 (*)     MCH 31.7 (*)     All other components within normal limits    Narrative:     Release to patient->Immediate   COMPREHENSIVE METABOLIC PANEL - Abnormal; Notable for the following components:    AST 41 (*)     ALT 46 (*)     eGFR 46.9 (*)     All other components within normal limits    Narrative:     Release to patient->Immediate   TROPONIN I - Abnormal; Notable for the following components:    Troponin I 0.044 (*)     All other components within normal limits    Narrative:     Release to patient->Immediate   B-TYPE NATRIURETIC PEPTIDE - Abnormal; Notable for the following components:     (*)      All other components within normal limits    Narrative:     Release to patient->Immediate   TROPONIN I - Abnormal; Notable for the following components:    Troponin I 0.047 (*)     All other components within normal limits   LIPID PANEL - Abnormal; Notable for the following components:    HDL 81 (*)     All other components within normal limits   INFLUENZA A & B BY MOLECULAR   SARS-COV-2 RNA AMPLIFICATION, QUAL    Narrative:     Is the patient symptomatic?->No   HEMOGLOBIN A1C   TSH     EKG Readings: (Independently Interpreted)   EKG personally reviewed by me shows normal sinus rhythm, possible left atrial enlargement, left axis, left bundle-branch block.  73 beats per minute, VT interval 146, .      ECG Results              EKG 12-lead (Final result)  Result time 01/29/24 08:21:55      Final result by Interface, Lab In [facility] (01/29/24 08:21:55)                   Narrative:    Test Reason : R06.02,    Vent. Rate : 073 BPM     Atrial Rate : 073 BPM     P-R Int : 146 ms          QRS Dur : 126 ms      QT Int : 436 ms       P-R-T Axes : 044 -30 075 degrees     QTc Int : 480 ms    Normal sinus rhythm  Possible Left atrial enlargement  Left axis deviation  Left bundle branch block  Abnormal ECG  When compared with ECG of 31-AUG-2021 16:46,  Left bundle branch block is now Present  Confirmed by Hakan Bradley MD ([id number]) on 1/29/2024 8:21:41 AM    Referred By: [email or web address]   SELF           Confirmed By:Hakan Bradley MD                                     EKG 12-LEAD (Final result)  Result time 01/30/24 09:01:21      Final result by Unknown User (01/30/24 09:01:21)                                      Imaging Results              X-Ray Chest AP (Final result)  Result time 01/27/24 07:52:33      Final result by Galen Mancilla MD (01/27/24 07:52:33)                   Impression:      Mild interstitial changes raising consideration for interstitial pulmonary edema or atypical infectious process.      Electronically signed  by: Galen Mancilla MD  Date:    01/27/2024  Time:    07:52               Narrative:    EXAMINATION:  XR CHEST AP PORTABLE    CLINICAL HISTORY:  shortness of breath;    TECHNIQUE:  Single frontal view of the chest was performed.    COMPARISON:  02/20/2014    FINDINGS:  The heart is mildly enlarged there is calcification of the aorta.  Prominent additional markings is present bilaterally.  No pleural effusion                                    X-Rays:   Independently Interpreted Readings:   Other Readings:  Chest x-ray, personally reviewed by me shows cardiomegaly.  Diffuse interstitial opacities suggestive of mild CHF.  Normal skeletal structures.    Medications   albuterol-ipratropium 2.5 mg-0.5 mg/3 mL nebulizer solution 3 mL (3 mLs Nebulization Given 1/27/24 0721)   furosemide injection 40 mg (40 mg Intravenous Given 1/27/24 0823)   magnesium sulfate in dextrose IVPB (premix) 1 g (0 g Intravenous Stopped 1/28/24 0950)   tuberculin injection 5 Units (5 Units Intradermal Given 1/29/24 1449)     Medical Decision Making  Differential includes CHF, pneumonia, COVID-19, influenza, COPD, myocardial infarction, pulmonary embolus, etc.    Patient was signs and symptoms of CHF, although she denies any history of CHF, COPD, etc..  She does not take Lasix, but does take HCTZ.  Review of her chart shows that she was seen by cardiology about 6 months ago for heart murmur and CHF.  Cardiac echo at that time showed an EF of 45 %.  Today, patient has some mild pulmonary edema on chest x-ray.  Also cardiomegaly.  Legs are edematous, she has JVD on exam, and auscultation of lungs reveals rales.  Labs show BNP greater than 600.  Troponin slightly elevated, likely secondary to the CHF.  EKG shows no evidence of STEMI, left atrial enlargement, left axis, and left bundle-branch block.  No recent EKGs, but an old EKG in her chart does not show any evidence of left bundle-branch block.  Patient initially denied pain, but now that she  is feeling better and able to talk, she does describe some intermittent pains over the last few days.  Myocardial infarction is a possibility.  Patient was placed on BiPAP and is doing well.  Blood pressure has corrected nicely and patient is comfortable.  Patient will need hospitalization for further treatment.  Hospitalist will be contacted.    Amount and/or Complexity of Data Reviewed  Labs: ordered.  Radiology: ordered.    Risk  Prescription drug management.                                      Clinical Impression:  Final diagnoses:  [R06.02] Shortness of breath  [I50.9] Congestive heart failure, unspecified HF chronicity, unspecified heart failure type (Primary)          ED Disposition Condition    Observation Stable                Jose Luis Sibley MD  01/27/24 [id number]       Jose Luis Sibley MD  02/04/24 2342

## 2024-01-27 NOTE — ASSESSMENT & PLAN NOTE
Elevated troponin with associated chest pain and diaphoresis  ECG w/ new LBBB  Advised patient that she may be having acute MI. She states that she would not want any procedures including LHC or aggressive measures  Patient appears to understand risk and benefits  Family in room for conversation

## 2024-01-27 NOTE — ASSESSMENT & PLAN NOTE
Had GOC conversation with patient and her family. Patient is DNR/DNI  She does not want any procedures or aggressive measures

## 2024-01-27 NOTE — ASSESSMENT & PLAN NOTE
Patient with Hypoxic Respiratory failure which is Acute.  she is not on home oxygen. Supplemental oxygen was provided and noted- Oxygen Concentration (%):  [28] 28    .   Signs/symptoms of respiratory failure include- increased work of breathing. Contributing diagnoses includes - CHF Labs and images were reviewed. Patient Has not had a recent ABG. Will treat underlying causes and adjust management of respiratory failure as follows-     Treat per CHF pathway  Weaned to RA currently

## 2024-01-27 NOTE — PLAN OF CARE
Patient received on 2 lpm in distress.Patient placed on BIPAP with settings as documented. Duoneb treatment then given in line. Some improvement noted. Will continue to monitor.

## 2024-01-27 NOTE — H&P
[facility] Medicine  History & Physical    Patient Name: Re Arciniega  MRN: [id number]  Patient Class: OP- Observation  Admission Date: 1/27/2024  Attending Physician: Michel Mackay MD   Primary Care Provider: Anthony Emanuel MD         Patient information was obtained from patient and ER records.     Subjective:     Principal Problem:NSTEMI (non-ST elevated myocardial infarction)    Chief Complaint:   Chief Complaint   Patient presents with    Shortness of Breath        HPI: 89 yo w/ hx of HTN, Hypothyroidism, Chronic HFrEF presenting with SOB. Symptoms started acutely this am when patient was trying to get out of bed. Patient could not catch her breath and called daughter who called 911. Nothing like this before. For the past 3 days patient states she has been having pain in her left axilla. Pain comes and goes. Not associated with activity. Nothing like this before. Also having associated diaphoresis. No NV. No hx of COPD. No fever, chills, diarrhea. Patient endorses chronic LE swelling that is no worse than usual. No problems laying flat.     Past Medical History:   Diagnosis Date    Anemia     Constipation     Hypertension     Thyroid disease        Past Surgical History:   Procedure Laterality Date    CHOLECYSTECTOMY      HYSTERECTOMY         Review of patient's allergies indicates:  No Known Allergies    No current facility-administered medications on file prior to encounter.     Current Outpatient Medications on File Prior to Encounter   Medication Sig    clobetasol 0.05% (TEMOVATE) 0.05 % Oint Apply topically 2 (two) times daily. Once daily to heels    cyanocobalamin 1,000 mcg/mL injection     docusate sodium (COLACE) 50 MG capsule Take by mouth 2 (two) times daily.    erythromycin (ROMYCIN) ophthalmic ointment     ferrous sulfate (FEOSOL) 325 mg (65 mg iron) Tab tablet ferrous sulfate 325 mg (65 mg iron) tablet    hydroCHLOROthiazide (HYDRODIURIL) 25 MG tablet Take 25 mg  by mouth.    iron-vitamin C 100-250 mg, ICAR-C, 100-250 mg Tab     levothyroxine (SYNTHROID) 88 MCG tablet     losartan (COZAAR) 100 MG tablet Take 100 mg by mouth once daily.    polyethylene glycol (GLYCOLAX) 17 gram PwPk Take 17 g by mouth once daily.    potassium chloride SA (K-DUR,KLOR-CON M) 10 MEQ tablet     telmisartan (MICARDIS) 80 MG Tab      Family History    Non-contributory       Tobacco Use    Smoking status: Never    Smokeless tobacco: Never   Substance and Sexual Activity    Alcohol use: No    Drug use: No    Sexual activity: Never     Review of Systems   All other systems reviewed and are negative.    Objective:     Vital Signs (Most Recent):  Temp: 98.7 °F (37.1 °C) (01/27/24 0704)  Pulse: 66 (01/27/24 1033)  Resp: 18 (01/27/24 1033)  BP: 137/78 (01/27/24 1033)  SpO2: 98 % (01/27/24 1033) Vital Signs (24h Range):  Temp:  [98.7 °F (37.1 °C)] 98.7 °F (37.1 °C)  Pulse:  [] 66  Resp:  [16-34] 18  SpO2:  [88 %-100 %] 98 %  BP: (137-185)/() 137/78     Weight: 54.4 kg (120 lb)  Body mass index is 19.37 kg/m².     Physical Exam        Vitals reviewed  General: NAD, elderly, frail  Head: NC/AT  Eyes: EOMI, KATHERINE  Cardiovascular: Pulses intact distally, Regular Rate and rhythm  Pulmonary: Normal Respiratory Rate, No respiratory distress  Gi: Soft, Non-tender  Extremities: Warm, Non pitting edema bilateral lower extremities  Skin: Warm, dry  Neuro: Alert, Oriented, No focal Deficit  Psych: Appropriate mood and affect       Significant Labs: All pertinent labs within the past 24 hours have been reviewed.    Significant Imaging: I have reviewed all pertinent imaging results/findings within the past 24 hours.  Assessment/Plan:     * NSTEMI (non-ST elevated myocardial infarction)  Elevated troponin with associated chest pain and diaphoresis  ECG w/ new LBBB  Advised patient that she may be having acute MI. She states that she would not want any procedures including LHC or aggressive measures  Patient  appears to understand risk and benefits  Family in room for conversation  Continue ASA  Holding statin with mild transaminitis    Transaminitis  Mild  Possibly congestive hepatopathy  Monitor daily  If no improvement by tomorrow will consider RUQ US and hep panel      Acute respiratory failure with hypoxia  Patient with Hypoxic Respiratory failure which is Acute.  she is not on home oxygen. Supplemental oxygen was provided and noted- Oxygen Concentration (%):  [28] 28    .   Signs/symptoms of respiratory failure include- increased work of breathing. Contributing diagnoses includes - CHF Labs and images were reviewed. Patient Has not had a recent ABG. Will treat underlying causes and adjust management of respiratory failure as follows-     Treat per CHF pathway  Weaned to RA currently    Advanced care planning/counseling discussion  Had GOC conversation with patient and her family. Patient is DNR/DNI  She does not want any procedures or aggressive measures      Heart failure with reduced ejection fraction  Acute on chronic HFrEF exacerbation  On CHF pathway  Given IV diuresis in ED. Good UOP and now on RA. Hold further doses for now  Keep K>4, Mg>2  Tele  Strict I's and O's  Daily weight  TTE ordered      Hypothyroidism  F/U TSH  Continue synthroid      Hypertension  Chronic  Controlled  Continue home medications as tolerated and titrate as needed  PRN Hydral if BP >180/110 and patient with significant symptoms of chest pain, nausea, AMS, SOB      VTE Risk Mitigation (From admission, onward)           Ordered     enoxaparin injection 30 mg  Daily         01/27/24 0956     IP VTE HIGH RISK PATIENT  Once         01/27/24 0956     Place sequential compression device  Until discontinued         01/27/24 0956                       On 01/27/2024, patient should be placed in hospital observation services under my care.        Pharmacist Renal Dose Adjustment Note    Re Arciniega is a 89 y.o. female being treated with the  medication Enoxaparin.    Patient Data:    Vital Signs (Most Recent):  Temp: 98.7 °F (37.1 °C) (01/27/24 0704)  Pulse: 66 (01/27/24 1004)  Resp: 17 (01/27/24 1004)  BP: (!) 147/95 (01/27/24 1004)  SpO2: 97 % (01/27/24 1004) Vital Signs (72h Range):  Temp:  [98.7 °F (37.1 °C)]   Pulse:  []   Resp:  [16-34]   BP: (139-185)/()   SpO2:  [88 %-100 %]      Recent Labs   Lab 01/27/24  0717   CREATININE 1.1     Serum creatinine: 1.1 mg/dL 01/27/24 0717  Estimated creatinine clearance: 27.4 mL/min    Enoxaparin 40 mg every 24 hours has been renally adjusted to enoxaparin 30 mg every 24 hours.    Pharmacist's Name: Mary WhitmanD  Pharmacist's Extension: [phone]      Michel Mackay MD  Department of [facility] Medicine  [facility] Emergency Dept

## 2024-01-27 NOTE — SUBJECTIVE & OBJECTIVE
Past Medical History:   Diagnosis Date    Anemia     Constipation     Hypertension     Thyroid disease        Past Surgical History:   Procedure Laterality Date    CHOLECYSTECTOMY      HYSTERECTOMY         Review of patient's allergies indicates:  No Known Allergies    No current facility-administered medications on file prior to encounter.     Current Outpatient Medications on File Prior to Encounter   Medication Sig    clobetasol 0.05% (TEMOVATE) 0.05 % Oint Apply topically 2 (two) times daily. Once daily to heels    cyanocobalamin 1,000 mcg/mL injection     docusate sodium (COLACE) 50 MG capsule Take by mouth 2 (two) times daily.    erythromycin (ROMYCIN) ophthalmic ointment     ferrous sulfate (FEOSOL) 325 mg (65 mg iron) Tab tablet ferrous sulfate 325 mg (65 mg iron) tablet    hydroCHLOROthiazide (HYDRODIURIL) 25 MG tablet Take 25 mg by mouth.    iron-vitamin C 100-250 mg, ICAR-C, 100-250 mg Tab     levothyroxine (SYNTHROID) 88 MCG tablet     losartan (COZAAR) 100 MG tablet Take 100 mg by mouth once daily.    polyethylene glycol (GLYCOLAX) 17 gram PwPk Take 17 g by mouth once daily.    potassium chloride SA (K-DUR,KLOR-CON M) 10 MEQ tablet     telmisartan (MICARDIS) 80 MG Tab      Family History    Non-contributory       Tobacco Use    Smoking status: Never    Smokeless tobacco: Never   Substance and Sexual Activity    Alcohol use: No    Drug use: No    Sexual activity: Never     Review of Systems   All other systems reviewed and are negative.    Objective:     Vital Signs (Most Recent):  Temp: 98.7 °F (37.1 °C) (01/27/24 0704)  Pulse: 66 (01/27/24 1033)  Resp: 18 (01/27/24 1033)  BP: 137/78 (01/27/24 1033)  SpO2: 98 % (01/27/24 1033) Vital Signs (24h Range):  Temp:  [98.7 °F (37.1 °C)] 98.7 °F (37.1 °C)  Pulse:  [] 66  Resp:  [16-34] 18  SpO2:  [88 %-100 %] 98 %  BP: (137-185)/() 137/78     Weight: 54.4 kg (120 lb)  Body mass index is 19.37 kg/m².     Physical Exam        Vitals reviewed  General:  NAD, elderly, frail  Head: NC/AT  Eyes: EOMI, KATHERINE  Cardiovascular: Pulses intact distally, Regular Rate and rhythm  Pulmonary: Normal Respiratory Rate, No respiratory distress  Gi: Soft, Non-tender  Extremities: Warm, Non pitting edema bilateral lower extremities  Skin: Warm, dry  Neuro: Alert, Oriented, No focal Deficit  Psych: Appropriate mood and affect       Significant Labs: All pertinent labs within the past 24 hours have been reviewed.    Significant Imaging: I have reviewed all pertinent imaging results/findings within the past 24 hours.

## 2024-01-27 NOTE — PROGRESS NOTES
Pharmacist Renal Dose Adjustment Note    Re Arciniega is a 89 y.o. female being treated with the medication Enoxaparin.    Patient Data:    Vital Signs (Most Recent):  Temp: 98.7 °F (37.1 °C) (01/27/24 0704)  Pulse: 66 (01/27/24 1004)  Resp: 17 (01/27/24 1004)  BP: (!) 147/95 (01/27/24 1004)  SpO2: 97 % (01/27/24 1004) Vital Signs (72h Range):  Temp:  [98.7 °F (37.1 °C)]   Pulse:  []   Resp:  [16-34]   BP: (139-185)/()   SpO2:  [88 %-100 %]      Recent Labs   Lab 01/27/24  0717   CREATININE 1.1     Serum creatinine: 1.1 mg/dL 01/27/24 0717  Estimated creatinine clearance: 27.4 mL/min    Enoxaparin 40 mg every 24 hours has been renally adjusted to enoxaparin 30 mg every 24 hours.    Pharmacist's Name: Raphael Valadez, PharmD  Pharmacist's Extension: [phone]

## 2024-01-27 NOTE — NURSING
Received from Er via stretcher.  Awake and oriented.  In no distress.  VSS.  Respirations even and unlabored.  Nasal canula at 2 liters.  Sats 98%.  External catheter is in place.  Incontinence pads clean and dry.  Daughter is at bedside.  Plan of care discussed.  Verbalized understanding.  Call light within reach.  See flowsheet for further assessment.

## 2024-01-27 NOTE — ASSESSMENT & PLAN NOTE
Elevated troponin with associated chest pain and diaphoresis  ECG w/ new LBBB  Advised patient that she may be having acute MI. She states that she would not want any procedures including LHC or aggressive measures  Patient appears to understand risk and benefits  Family in room for conversation  Continue ASA  Holding statin with mild transaminitis- can start tomorrow if lfts stable  TTE monday

## 2024-01-27 NOTE — ED TRIAGE NOTES
Pt presents to the er with c/o shortness of breath that started this morning. Pt reports that she has also been having left sided chest pains on and off for the last couple days prior to the shortness of breath episode that started this morning. Pt denies chest pains at this time.

## 2024-01-27 NOTE — ASSESSMENT & PLAN NOTE
Acute on chronic HFrEF exacerbation  On CHF pathway  Given IV diuresis in ED. Good UOP and now on RA. Hold further doses for now  Keep K>4, Mg>2  Tele  Strict I's and O's  Daily weight  TTE ordered

## 2024-01-27 NOTE — ASSESSMENT & PLAN NOTE
Chronic  Controlled  Continue home medications as tolerated and titrate as needed  PRN Hydral if BP >180/110 and patient with significant symptoms of chest pain, nausea, AMS, SOB

## 2024-01-28 LAB
ALBUMIN SERPL BCP-MCNC: 3.4 G/DL (ref 3.5–5.2)
ALP SERPL-CCNC: 52 U/L (ref 55–135)
ALT SERPL W/O P-5'-P-CCNC: 29 U/L (ref 10–44)
ANION GAP SERPL CALC-SCNC: 11 MMOL/L (ref 8–16)
AST SERPL-CCNC: 24 U/L (ref 10–40)
BASOPHILS # BLD AUTO: 0.02 K/UL (ref 0–0.2)
BASOPHILS NFR BLD: 0.6 % (ref 0–1.9)
BILIRUB SERPL-MCNC: 0.8 MG/DL (ref 0.1–1)
BUN SERPL-MCNC: 25 MG/DL (ref 10–30)
CALCIUM SERPL-MCNC: 8.7 MG/DL (ref 8.7–10.5)
CHLORIDE SERPL-SCNC: 104 MMOL/L (ref 95–110)
CO2 SERPL-SCNC: 25 MMOL/L (ref 23–29)
CREAT SERPL-MCNC: 0.9 MG/DL (ref 0.5–1.4)
DIFFERENTIAL METHOD BLD: ABNORMAL
EOSINOPHIL # BLD AUTO: 0.1 K/UL (ref 0–0.5)
EOSINOPHIL NFR BLD: 4 % (ref 0–8)
ERYTHROCYTE [DISTWIDTH] IN BLOOD BY AUTOMATED COUNT: 13.5 % (ref 11.5–14.5)
EST. GFR  (NO RACE VARIABLE): 59.6 ML/MIN/1.73 M^2
GLUCOSE SERPL-MCNC: 76 MG/DL (ref 70–110)
HCT VFR BLD AUTO: 30.2 % (ref 37–48.5)
HGB BLD-MCNC: 10 G/DL (ref 12–16)
IMM GRANULOCYTES # BLD AUTO: 0 K/UL (ref 0–0.04)
IMM GRANULOCYTES NFR BLD AUTO: 0 % (ref 0–0.5)
LYMPHOCYTES # BLD AUTO: 1.2 K/UL (ref 1–4.8)
LYMPHOCYTES NFR BLD: 32.8 % (ref 18–48)
MAGNESIUM SERPL-MCNC: 1.4 MG/DL (ref 1.6–2.6)
MCH RBC QN AUTO: 31.8 PG (ref 27–31)
MCHC RBC AUTO-ENTMCNC: 33.1 G/DL (ref 32–36)
MCV RBC AUTO: 96 FL (ref 82–98)
MONOCYTES # BLD AUTO: 0.5 K/UL (ref 0.3–1)
MONOCYTES NFR BLD: 12.7 % (ref 4–15)
NEUTROPHILS # BLD AUTO: 1.8 K/UL (ref 1.8–7.7)
NEUTROPHILS NFR BLD: 49.9 % (ref 38–73)
NRBC BLD-RTO: 0 /100 WBC
PLATELET # BLD AUTO: 139 K/UL (ref 150–450)
PMV BLD AUTO: 10.8 FL (ref 9.2–12.9)
POTASSIUM SERPL-SCNC: 4.4 MMOL/L (ref 3.5–5.1)
PROT SERPL-MCNC: 6.3 G/DL (ref 6–8.4)
RBC # BLD AUTO: 3.14 M/UL (ref 4–5.4)
SODIUM SERPL-SCNC: 140 MMOL/L (ref 136–145)
WBC # BLD AUTO: 3.54 K/UL (ref 3.9–12.7)

## 2024-01-28 PROCEDURE — 99233 SBSQ HOSP IP/OBS HIGH 50: CPT | Mod: ,,, | Performed by: STUDENT IN AN ORGANIZED HEALTH CARE EDUCATION/TRAINING PROGRAM

## 2024-01-28 PROCEDURE — 80053 COMPREHEN METABOLIC PANEL: CPT | Performed by: STUDENT IN AN ORGANIZED HEALTH CARE EDUCATION/TRAINING PROGRAM

## 2024-01-28 PROCEDURE — 83735 ASSAY OF MAGNESIUM: CPT | Performed by: STUDENT IN AN ORGANIZED HEALTH CARE EDUCATION/TRAINING PROGRAM

## 2024-01-28 PROCEDURE — 85025 COMPLETE CBC W/AUTO DIFF WBC: CPT | Performed by: STUDENT IN AN ORGANIZED HEALTH CARE EDUCATION/TRAINING PROGRAM

## 2024-01-28 PROCEDURE — 99900035 HC TECH TIME PER 15 MIN (STAT)

## 2024-01-28 PROCEDURE — 25000003 PHARM REV CODE 250: Performed by: STUDENT IN AN ORGANIZED HEALTH CARE EDUCATION/TRAINING PROGRAM

## 2024-01-28 PROCEDURE — 63600175 PHARM REV CODE 636 W HCPCS: Performed by: STUDENT IN AN ORGANIZED HEALTH CARE EDUCATION/TRAINING PROGRAM

## 2024-01-28 PROCEDURE — 25000003 PHARM REV CODE 250: Performed by: INTERNAL MEDICINE

## 2024-01-28 PROCEDURE — 21400001 HC TELEMETRY ROOM

## 2024-01-28 RX ORDER — MAGNESIUM SULFATE 1 G/100ML
1 INJECTION INTRAVENOUS ONCE
Status: COMPLETED | OUTPATIENT
Start: 2024-01-28 | End: 2024-01-28

## 2024-01-28 RX ORDER — FUROSEMIDE 20 MG/1
20 TABLET ORAL DAILY
Status: DISCONTINUED | OUTPATIENT
Start: 2024-01-28 | End: 2024-01-31 | Stop reason: HOSPADM

## 2024-01-28 RX ORDER — ACETAMINOPHEN 325 MG/1
650 TABLET ORAL EVERY 6 HOURS PRN
Status: DISCONTINUED | OUTPATIENT
Start: 2024-01-28 | End: 2024-01-31 | Stop reason: HOSPADM

## 2024-01-28 RX ADMIN — LEVOTHYROXINE SODIUM 88 MCG: 88 TABLET ORAL at 05:01

## 2024-01-28 RX ADMIN — FUROSEMIDE 20 MG: 20 TABLET ORAL at 08:01

## 2024-01-28 RX ADMIN — MAGNESIUM SULFATE IN DEXTROSE 1 G: 10 INJECTION, SOLUTION INTRAVENOUS at 08:01

## 2024-01-28 RX ADMIN — DOCUSATE SODIUM 200 MG: 100 CAPSULE ORAL at 03:01

## 2024-01-28 RX ADMIN — ASPIRIN 81 MG: 81 TABLET, CHEWABLE ORAL at 08:01

## 2024-01-28 RX ADMIN — ACETAMINOPHEN 650 MG: 325 TABLET ORAL at 01:01

## 2024-01-28 RX ADMIN — ENOXAPARIN SODIUM 30 MG: 30 INJECTION SUBCUTANEOUS at 04:01

## 2024-01-28 NOTE — CARE UPDATE
"Per nursing communication, "I have Mrs Demian she is requesting some tylenol for some foot pain she normally has at night. also on exam I noted a heart murmur that she said she normally doesn't have. no chest pain reported or sob."    -Tylenol ordered  -Primary to follow up heart murmur in am  "

## 2024-01-28 NOTE — ASSESSMENT & PLAN NOTE
Acute on chronic HFrEF exacerbation  On CHF pathway  Given IV diuresis in ED. Good UOP and now on RA. Hold further doses for now  PO lasix  Keep K>4, Mg>2  Tele  Strict I's and O's  Daily weight  TTE ordered

## 2024-01-28 NOTE — PROGRESS NOTES
[facility]  Progress Note    Patient Name: Re Arciniega  MRN: [id number]  Patient Class: OP- Observation   Admission Date: 1/27/2024  Length of Stay: 0 days  Attending Physician: Michel Mackay MD  Primary Care Provider: Anthony Emanuel MD        Subjective:     Principal Problem:NSTEMI (non-ST elevated myocardial infarction)        HPI:  89 yo w/ hx of HTN, Hypothyroidism, Chronic HFrEF presenting with SOB. Symptoms started acutely this am when patient was trying to get out of bed. Patient could not catch her breath and called daughter who called 911. Nothing like this before. For the past 3 days patient states she has been having pain in her left axilla. Pain comes and goes. Not associated with activity. Nothing like this before. Also having associated diaphoresis. No NV. No hx of COPD. No fever, chills, diarrhea. Patient endorses chronic LE swelling that is no worse than usual. No problems laying flat.     Overview/Hospital Course:  No notes on file    Interval History: NAEON. On RA. Trop downdtrended. TTE tomorrow and then likely discharge    Review of Systems   All other systems reviewed and are negative.    Objective:     Vital Signs (Most Recent):  Temp: 97.8 °F (36.6 °C) (01/28/24 0400)  Pulse: 66 (01/28/24 0735)  Resp: 17 (01/28/24 0735)  BP: 124/73 (01/28/24 0400)  SpO2: 97 % (01/28/24 0735) Vital Signs (24h Range):  Temp:  [97.8 °F (36.6 °C)-98.3 °F (36.8 °C)] 97.8 °F (36.6 °C)  Pulse:  [62-79] 66  Resp:  [12-23] 17  SpO2:  [95 %-100 %] 97 %  BP: (111-136)/(64-80) 124/73     Weight: 55 kg (121 lb 4.1 oz)  Body mass index is 19.57 kg/m².    Intake/Output Summary (Last 24 hours) at 1/28/2024 1039  Last data filed at 1/28/2024 0000  Gross per 24 hour   Intake --   Output 1750 ml   Net -1750 ml         Physical Exam      Vitals reviewed  General: NAD, elderly, frail  Head: NC/AT  Eyes: EOMI, KATHERINE  Cardiovascular: Pulses intact distally, Regular Rate and  rhythm  Pulmonary: Normal Respiratory Rate, No respiratory distress  Gi: Soft, Non-tender  Extremities: Warm, Non pitting edema bilateral lower extremities  Skin: Warm, dry  Neuro: Alert, Oriented, No focal Deficit  Psych: Appropriate mood and affect     Significant Labs: All pertinent labs within the past 24 hours have been reviewed.    Significant Imaging: I have reviewed all pertinent imaging results/findings within the past 24 hours.    Assessment/Plan:      * NSTEMI (non-ST elevated myocardial infarction)  Elevated troponin with associated chest pain and diaphoresis  ECG w/ new LBBB  Advised patient that she may be having acute MI. She states that she would not want any procedures including LHC or aggressive measures  Patient appears to understand risk and benefits  Family in room for conversation  Continue ASA  Holding statin with mild transaminitis- can start tomorrow if lfts stable  TTE monday    Transaminitis  Mild  Possibly congestive hepatopathy  Monitor daily  Resolved      Acute respiratory failure with hypoxia  Patient with Hypoxic Respiratory failure which is Acute.  she is not on home oxygen. Supplemental oxygen was provided and noted- Oxygen Concentration (%):  [28] 28    .   Signs/symptoms of respiratory failure include- increased work of breathing. Contributing diagnoses includes - CHF Labs and images were reviewed. Patient Has not had a recent ABG. Will treat underlying causes and adjust management of respiratory failure as follows-     Treat per CHF pathway  Weaned to RA currently    Advanced care planning/counseling discussion  Had GOC conversation with patient and her family. Patient is DNR/DNI  She does not want any procedures or aggressive measures      Heart failure with reduced ejection fraction  Acute on chronic HFrEF exacerbation  On CHF pathway  Given IV diuresis in ED. Good UOP and now on RA. Hold further doses for now  PO lasix  Keep K>4, Mg>2  Tele  Strict I's and O's  Daily  weight  TTE ordered      Hypothyroidism  F/U TSH  Continue synthroid      Hypertension  Chronic  Controlled  Continue home medications as tolerated and titrate as needed  PRN Hydral if BP >180/110 and patient with significant symptoms of chest pain, nausea, AMS, SOB      VTE Risk Mitigation (From admission, onward)           Ordered     enoxaparin injection 30 mg  Daily         01/27/24 0956     IP VTE HIGH RISK PATIENT  Once         01/27/24 0956     Place sequential compression device  Until discontinued         01/27/24 0956                    Discharge Planning   ART:      Code Status: DNR   Is the patient medically ready for discharge?:     Reason for patient still in hospital (select all that apply): Treatment                     Michel Mackay MD  Department of Hospital Medicine   [place] - Intensive Care

## 2024-01-28 NOTE — PLAN OF CARE
Problem: Gas Exchange Impaired  Goal: Optimal Gas Exchange  Outcome: Ongoing, Progressing  Intervention: Optimize Oxygenation and Ventilation  Flowsheets (Taken 1/28/2024 4773)  Airway/Ventilation Management: airway patency maintained  Head of Bed (HOB) Positioning: HOB elevated   Patient in no apparent distress. Sat's  97 % on room air.PRN treatments not needed . Will continue to monitor.

## 2024-01-28 NOTE — NURSING
Report received.  Assessment done.  VSS.  Resting quietly.  No complaints at this time.  Respirations even and unlabored, on room air.  External catheter is in place.  Incontinence pads are clean and dry.  Skin intact.  Call light within reach.  Plan of care discussed with patient and daughter.  Verbalized agreement.  See flowsheet for further assessment.

## 2024-01-28 NOTE — PLAN OF CARE
Problem: Adult Inpatient Plan of Care  Goal: Plan of Care Review  Outcome: Ongoing, Progressing  Goal: Patient-Specific Goal (Individualized)  Outcome: Ongoing, Progressing  Goal: Absence of Hospital-Acquired Illness or Injury  Outcome: Ongoing, Progressing  Goal: Optimal Comfort and Wellbeing  Outcome: Ongoing, Progressing  Intervention: Monitor Pain and Promote Comfort  Flowsheets (Taken 1/28/2024 0037)  Pain Management Interventions:   diversional activity provided   quiet environment facilitated   relaxation techniques promoted     Problem: Skin Injury Risk Increased  Goal: Skin Health and Integrity  Outcome: Ongoing, Progressing  Intervention: Optimize Skin Protection  Flowsheets (Taken 1/28/2024 0037)  Pressure Reduction Techniques: frequent weight shift encouraged  Pressure Reduction Devices: heel offloading device utilized  Skin Protection: tubing/devices free from skin contact  Head of Bed (HOB) Positioning: HOB at 20-30 degrees     Problem: Fall Injury Risk  Goal: Absence of Fall and Fall-Related Injury  Outcome: Ongoing, Progressing  Intervention: Identify and Manage Contributors  Flowsheets (Taken 1/28/2024 0037)  Self-Care Promotion: BADL personal objects within reach  Medication Review/Management: medications reviewed

## 2024-01-28 NOTE — SUBJECTIVE & OBJECTIVE
Interval History: NAEON. On RA. Trop downdtrended. TTE tomorrow and then likely discharge    Review of Systems   All other systems reviewed and are negative.    Objective:     Vital Signs (Most Recent):  Temp: 97.8 °F (36.6 °C) (01/28/24 0400)  Pulse: 66 (01/28/24 0735)  Resp: 17 (01/28/24 0735)  BP: 124/73 (01/28/24 0400)  SpO2: 97 % (01/28/24 0735) Vital Signs (24h Range):  Temp:  [97.8 °F (36.6 °C)-98.3 °F (36.8 °C)] 97.8 °F (36.6 °C)  Pulse:  [62-79] 66  Resp:  [12-23] 17  SpO2:  [95 %-100 %] 97 %  BP: (111-136)/(64-80) 124/73     Weight: 55 kg (121 lb 4.1 oz)  Body mass index is 19.57 kg/m².    Intake/Output Summary (Last 24 hours) at 1/28/2024 1039  Last data filed at 1/28/2024 0000  Gross per 24 hour   Intake --   Output 1750 ml   Net -1750 ml         Physical Exam      Vitals reviewed  General: NAD, elderly, frail  Head: NC/AT  Eyes: EOMI, KATHERINE  Cardiovascular: Pulses intact distally, Regular Rate and rhythm  Pulmonary: Normal Respiratory Rate, No respiratory distress  Gi: Soft, Non-tender  Extremities: Warm, Non pitting edema bilateral lower extremities  Skin: Warm, dry  Neuro: Alert, Oriented, No focal Deficit  Psych: Appropriate mood and affect     Significant Labs: All pertinent labs within the past 24 hours have been reviewed.    Significant Imaging: I have reviewed all pertinent imaging results/findings within the past 24 hours.

## 2024-01-29 LAB
ALBUMIN SERPL BCP-MCNC: 3.6 G/DL (ref 3.5–5.2)
ALP SERPL-CCNC: 58 U/L (ref 55–135)
ALT SERPL W/O P-5'-P-CCNC: 25 U/L (ref 10–44)
ANION GAP SERPL CALC-SCNC: 12 MMOL/L (ref 8–16)
AST SERPL-CCNC: 22 U/L (ref 10–40)
BASOPHILS # BLD AUTO: 0.03 K/UL (ref 0–0.2)
BASOPHILS NFR BLD: 0.7 % (ref 0–1.9)
BILIRUB SERPL-MCNC: 0.7 MG/DL (ref 0.1–1)
BUN SERPL-MCNC: 26 MG/DL (ref 10–30)
CALCIUM SERPL-MCNC: 9.1 MG/DL (ref 8.7–10.5)
CHLORIDE SERPL-SCNC: 104 MMOL/L (ref 95–110)
CO2 SERPL-SCNC: 24 MMOL/L (ref 23–29)
CREAT SERPL-MCNC: 1 MG/DL (ref 0.5–1.4)
DIFFERENTIAL METHOD BLD: ABNORMAL
EOSINOPHIL # BLD AUTO: 0.2 K/UL (ref 0–0.5)
EOSINOPHIL NFR BLD: 4.8 % (ref 0–8)
ERYTHROCYTE [DISTWIDTH] IN BLOOD BY AUTOMATED COUNT: 13.4 % (ref 11.5–14.5)
EST. GFR  (NO RACE VARIABLE): 52.5 ML/MIN/1.73 M^2
GLUCOSE SERPL-MCNC: 79 MG/DL (ref 70–110)
HCT VFR BLD AUTO: 33.9 % (ref 37–48.5)
HGB BLD-MCNC: 11.2 G/DL (ref 12–16)
IMM GRANULOCYTES # BLD AUTO: 0.01 K/UL (ref 0–0.04)
IMM GRANULOCYTES NFR BLD AUTO: 0.2 % (ref 0–0.5)
LYMPHOCYTES # BLD AUTO: 1.3 K/UL (ref 1–4.8)
LYMPHOCYTES NFR BLD: 31.5 % (ref 18–48)
MAGNESIUM SERPL-MCNC: 1.6 MG/DL (ref 1.6–2.6)
MCH RBC QN AUTO: 31.5 PG (ref 27–31)
MCHC RBC AUTO-ENTMCNC: 33 G/DL (ref 32–36)
MCV RBC AUTO: 96 FL (ref 82–98)
MONOCYTES # BLD AUTO: 0.5 K/UL (ref 0.3–1)
MONOCYTES NFR BLD: 12.5 % (ref 4–15)
NEUTROPHILS # BLD AUTO: 2.1 K/UL (ref 1.8–7.7)
NEUTROPHILS NFR BLD: 50.3 % (ref 38–73)
NRBC BLD-RTO: 0 /100 WBC
PLATELET # BLD AUTO: 144 K/UL (ref 150–450)
PMV BLD AUTO: 11 FL (ref 9.2–12.9)
POTASSIUM SERPL-SCNC: 4.5 MMOL/L (ref 3.5–5.1)
PROT SERPL-MCNC: 6.8 G/DL (ref 6–8.4)
RBC # BLD AUTO: 3.55 M/UL (ref 4–5.4)
SODIUM SERPL-SCNC: 140 MMOL/L (ref 136–145)
WBC # BLD AUTO: 4.16 K/UL (ref 3.9–12.7)

## 2024-01-29 PROCEDURE — 25000003 PHARM REV CODE 250: Performed by: STUDENT IN AN ORGANIZED HEALTH CARE EDUCATION/TRAINING PROGRAM

## 2024-01-29 PROCEDURE — 99233 SBSQ HOSP IP/OBS HIGH 50: CPT | Mod: ,,, | Performed by: STUDENT IN AN ORGANIZED HEALTH CARE EDUCATION/TRAINING PROGRAM

## 2024-01-29 PROCEDURE — 83735 ASSAY OF MAGNESIUM: CPT | Performed by: STUDENT IN AN ORGANIZED HEALTH CARE EDUCATION/TRAINING PROGRAM

## 2024-01-29 PROCEDURE — 80053 COMPREHEN METABOLIC PANEL: CPT | Performed by: STUDENT IN AN ORGANIZED HEALTH CARE EDUCATION/TRAINING PROGRAM

## 2024-01-29 PROCEDURE — 97166 OT EVAL MOD COMPLEX 45 MIN: CPT

## 2024-01-29 PROCEDURE — 97161 PT EVAL LOW COMPLEX 20 MIN: CPT

## 2024-01-29 PROCEDURE — 97535 SELF CARE MNGMENT TRAINING: CPT

## 2024-01-29 PROCEDURE — 30200315 PPD INTRADERMAL TEST REV CODE 302: Performed by: STUDENT IN AN ORGANIZED HEALTH CARE EDUCATION/TRAINING PROGRAM

## 2024-01-29 PROCEDURE — 85025 COMPLETE CBC W/AUTO DIFF WBC: CPT | Performed by: STUDENT IN AN ORGANIZED HEALTH CARE EDUCATION/TRAINING PROGRAM

## 2024-01-29 PROCEDURE — 94761 N-INVAS EAR/PLS OXIMETRY MLT: CPT

## 2024-01-29 PROCEDURE — 86580 TB INTRADERMAL TEST: CPT | Performed by: STUDENT IN AN ORGANIZED HEALTH CARE EDUCATION/TRAINING PROGRAM

## 2024-01-29 PROCEDURE — 21400001 HC TELEMETRY ROOM

## 2024-01-29 PROCEDURE — 63600175 PHARM REV CODE 636 W HCPCS: Performed by: STUDENT IN AN ORGANIZED HEALTH CARE EDUCATION/TRAINING PROGRAM

## 2024-01-29 RX ORDER — POLYETHYLENE GLYCOL 3350 17 G/17G
17 POWDER, FOR SOLUTION ORAL DAILY
Status: DISCONTINUED | OUTPATIENT
Start: 2024-01-29 | End: 2024-01-31 | Stop reason: HOSPADM

## 2024-01-29 RX ORDER — SODIUM CHLORIDE 0.9 % (FLUSH) 0.9 %
10 SYRINGE (ML) INJECTION
Status: DISCONTINUED | OUTPATIENT
Start: 2024-01-29 | End: 2024-01-31 | Stop reason: HOSPADM

## 2024-01-29 RX ORDER — MUPIROCIN 20 MG/G
OINTMENT TOPICAL 2 TIMES DAILY
Status: DISCONTINUED | OUTPATIENT
Start: 2024-01-29 | End: 2024-01-31 | Stop reason: HOSPADM

## 2024-01-29 RX ADMIN — MUPIROCIN: 20 OINTMENT TOPICAL at 09:01

## 2024-01-29 RX ADMIN — POLYETHYLENE GLYCOL (3350) 17 G: 17 POWDER, FOR SOLUTION ORAL at 03:01

## 2024-01-29 RX ADMIN — LEVOTHYROXINE SODIUM 88 MCG: 88 TABLET ORAL at 05:01

## 2024-01-29 RX ADMIN — ASPIRIN 81 MG: 81 TABLET, CHEWABLE ORAL at 09:01

## 2024-01-29 RX ADMIN — FUROSEMIDE 20 MG: 20 TABLET ORAL at 09:01

## 2024-01-29 RX ADMIN — TUBERCULIN PURIFIED PROTEIN DERIVATIVE 5 UNITS: 5 INJECTION INTRADERMAL at 02:01

## 2024-01-29 RX ADMIN — ENOXAPARIN SODIUM 30 MG: 30 INJECTION SUBCUTANEOUS at 04:01

## 2024-01-29 NOTE — PLAN OF CARE
[facility], [place], operated by [facility]  Initial Discharge Assessment       Primary Care Provider: Anthony Emanuel MD    Admission Diagnosis: Shortness of breath [R06.02]  Chest pain [R07.9]  Congestive heart failure, unspecified HF chronicity, unspecified heart failure type [I50.9]    Admission Date: 1/27/2024  Expected Discharge Date: 1/29/2024    Assessment completed with patient who was alert and oriented. Patient's daughter Nahomy Dash was also at bedside. Patient lives with her 61 year old blind son who she takes care of. She has both a rolling walker and a rollator at home. Patient states that she is getting weaker and needs to build up her strength to take care of her son. SW discussed home health and SNF with patient and she was unable to make a decision about what is best for her. Patient is worried that her daughters will not assist in her care if she goes home with home health and will not help take care of her son if she goes to SNF. Patient wanted to call and speak with her other daughters before making a decision. Patient's PCP is Dr. Anthony Emanuel and her pharmacy of choice is [facility]. CM will follow to determine best discharge plan.        Payor: MEDICARE / Plan: MEDICARE PART A & B / Product Type: Government /     Extended Emergency Contact Information  Primary Emergency Contact: Izzy,[place]  Mobile Phone: [phone]  Relation: Daughter  Preferred language: English   needed? No  Secondary Emergency Contact: rafi crespo  Mobile Phone: [phone]  Relation: Daughter    Discharge Plan A: Home Health  Discharge Plan B: Skilled Nursing Facility      [facility] [place], [place] - [address] [address]  Phone: [phone] Fax: [phone]      Initial Assessment (most recent)       Adult Discharge Assessment - 01/29/24 0944          Discharge Assessment    Assessment Type Discharge Planning Assessment     Confirmed/corrected address, phone number and  insurance Yes     Confirmed Demographics Correct on Facesheet     Source of Information patient;family     Does patient/caregiver understand observation status Yes     Communicated ART with patient/caregiver Yes     Reason For Admission shortness of breath; NSTEMI (non-ST elevated myocardial infraction)     People in Home alone;child(mehdi), adult   61 year old blind son    Do you expect to return to your current living situation? Other (see comments)   patient is unsure if wants to return to home or SNF    Do you have help at home or someone to help you manage your care at home? No   patient is unsure if her daughters will be available to help if she returns home or if they will help with son if she goes to SNF    Prior to hospitilization cognitive status: Unable to Assess     Current cognitive status: Alert/Oriented     Equipment Currently Used at Home walker, standard;rollator     Readmission within 30 days? No     Patient currently being followed by outpatient case management? No     Do you currently have service(s) that help you manage your care at home? No     Do you take prescription medications? Yes     Do you have prescription coverage? Yes     Do you have any problems affording any of your prescribed medications? No     Is the patient taking medications as prescribed? yes     Who is going to help you get home at discharge? daughters     How do you get to doctors appointments? family or friend will provide     Are you on dialysis? No     Do you take coumadin? No     Discharge Plan A Home Health     Discharge Plan B Skilled Nursing Facility     DME Needed Upon Discharge  none     Discharge Plan discussed with: Patient;Adult children   daughter Nahomy Dash       Physical Activity    On average, how many days per week do you engage in moderate to strenuous exercise (like a brisk walk)? 0 days     On average, how many minutes do you engage in exercise at this level? 0 min        Financial Resource Strain    How  hard is it for you to pay for the very basics like food, housing, medical care, and heating? Not very hard        Housing Stability    In the last 12 months, was there a time when you were not able to pay the mortgage or rent on time? No     In the last 12 months, was there a time when you did not have a steady place to sleep or slept in a shelter (including now)? No        Transportation Needs    In the past 12 months, has lack of transportation kept you from medical appointments or from getting medications? No     In the past 12 months, has lack of transportation kept you from meetings, work, or from getting things needed for daily living? No        Food Insecurity    Within the past 12 months, you worried that your food would run out before you got the money to buy more. Never true     Within the past 12 months, the food you bought just didn't last and you didn't have money to get more. Never true        Stress    Do you feel stress - tense, restless, nervous, or anxious, or unable to sleep at night because your mind is troubled all the time - these days? To some extent        Social Connections    In a typical week, how many times do you talk on the phone with family, friends, or neighbors? Patient declined     How often do you get together with friends or relatives? Patient declined     How often do you attend Baptist or Denominational services? Patient declined     Do you belong to any clubs or organizations such as Baptist groups, unions, fraternal or athletic groups, or school groups? Patient declined     How often do you attend meetings of the clubs or organizations you belong to? Patient declined     Are you , , , , never , or living with a partner? Patient declined        Alcohol Use    Q1: How often do you have a drink containing alcohol? Patient declined     Q2: How many drinks containing alcohol do you have on a typical day when you are drinking? Patient declined     Q3:  How often do you have six or more drinks on one occasion? Patient declined        OTHER    Name(s) of People in Home 61 year old blind son

## 2024-01-29 NOTE — SUBJECTIVE & OBJECTIVE
Interval History: NAEON. TTE tomorrow. PT/OT recommending SNF    Review of Systems   All other systems reviewed and are negative.    Objective:     Vital Signs (Most Recent):  Temp: 97.9 °F (36.6 °C) (01/28/24 2334)  Pulse: 70 (01/28/24 2334)  Resp: 14 (01/28/24 2334)  BP: 115/64 (01/29/24 0943)  SpO2: 95 % (01/29/24 0826) Vital Signs (24h Range):  Temp:  [97.9 °F (36.6 °C)] 97.9 °F (36.6 °C)  Pulse:  [70] 70  Resp:  [14] 14  SpO2:  [95 %] 95 %  BP: (115-120)/(64-70) 115/64     Weight: 55 kg (121 lb 4.1 oz)  Body mass index is 19.57 kg/m².    Intake/Output Summary (Last 24 hours) at 1/29/2024 1644  Last data filed at 1/28/2024 1746  Gross per 24 hour   Intake 120 ml   Output --   Net 120 ml         Physical Exam      Vitals reviewed  General: NAD, elderly, frail  Head: NC/AT  Eyes: EOMI, KATHERINE  Cardiovascular: Pulses intact distally, Regular Rate and rhythm  Pulmonary: Normal Respiratory Rate, No respiratory distress  Gi: Soft, Non-tender  Extremities: Warm, edema resolved in bilateral LE  Skin: Warm, dry  Neuro: Alert, Oriented, No focal Deficit  Psych: Appropriate mood and affect       Significant Labs: All pertinent labs within the past 24 hours have been reviewed.    Significant Imaging: I have reviewed all pertinent imaging results/findings within the past 24 hours.

## 2024-01-29 NOTE — PLAN OF CARE
Met with patient and family at bedside. Discussed therapy eval- all in agreement with SNF.   Would like [facility] if possible. Okay with referral being sent to [facility] as back up options       01/29/24 8202   Post-Acute Status   Post-Acute Authorization Placement   Post-Acute Placement Status Referrals Sent   Patient choice form signed by patient/caregiver List from System Post-Acute Care

## 2024-01-29 NOTE — PT/OT/SLP EVAL
"Occupational Therapy   Evaluation    Name: Re Arciniega  MRN: [id number]  Admitting Diagnosis: NSTEMI (non-ST elevated myocardial infarction)  Recent Surgery: * No surgery found *      Recommendations:     Discharge Recommendations:  dc to skilled vs home w/ hh  services w/ family  Discharge Equipment Recommendations:   bsc  Barriers to discharge:   patient lives w/ sons who work.   Assessment:     Re Arciniega is a 89 y.o. female with a medical diagnosis of NSTEMI (non-ST elevated myocardial infarction).  She presents with decreased ind w/ all adls. Performance deficits affecting function:  decreased functional mobility, decreased strength, decreased functional activity tolerance.        Rehab Prognosis: Good; patient would benefit from acute skilled OT services to address these deficits and reach maximum level of function.       Plan:     Patient to be seen  3+-5x's per week  to address the above listed problems via  ot intervention  Plan of Care Expires:  upon dc    Plan of Care Reviewed with:  patient/family     Subjective     Chief Complaint: chf  Patient/Family Comments/goals: "for her to get stronger to go home."    Occupational Profile:  Living Environment: patient lives in a raised home w / approx 4 steps to enter w/ hand rail on right side to enter home    Previous level of function: mod ind to ind w/ all adls and iadls.   Roles and Routines: typical self care  Equipment Used at Home:  rolling walker  Assistance upon Discharge: will have some assistance, but the sons she lives with both work      Pain/Comfort:   No pain reported at this time.     Patients cultural, spiritual, Mu-ism conflicts given the current situation:  will not interfere w/ her treatment    Objective:     Communicated with: nursing prior to session.  Patient found supine with purewick in place   upon OT entry to room.    General Precautions: Standard,    Orthopedic Precautions:  none  Braces:  none  Respiratory Status: Room " air    Occupational Performance:    Bed Mobility:    Patient completed Rolling/Turning to Left with  contact guard assistance  Patient completed Rolling/Turning to Right with contact guard assistance  Patient completed Scooting/Bridging with contact guard assistance  Patient completed Supine to Sit with minimum assistance  Patient completed Sit to Supine with minimum assistance    Functional Mobility/Transfers:  Patient completed Sit <> Stand Transfer with contact guard assistance  with  hand-held assist   Patient completed Bed <> Chair Transfer using Stand Pivot technique with minimum assistance with hand-held assist  Patient completed Chair <> Mat Stand Pivot technique with minimum assistance with hand-held assist      Activities of Daily Living:  Feeding:  supervision sua  Grooming: contact guard assistance from eob  Bathing: moderate assistance for bed bath   Upper Body Dressing: contact guard assistance on eob  Lower Body Dressing: minimum assistance sitting eob  Toileting: moderate assistance for perineal care    Cognitive/Visual Perceptual:  Cognitive/Psychosocial Skills:     -       Oriented to: Person, Place, and Time   -       Follows Commands/attention:Follows two-step commands  -       Communication: clear/fluent  -       Memory: Impaired STM  -       Safety awareness/insight to disability: intact     Physical Exam:  Patient's static/dynamic sitting balance is fair+/fair on eob; her static/dyanmic stand balance is fair/poor+ w/ use of rw. ; patient's arom bue's wfls all planes, strength bue's grossly 3+/5 all ranges.  Patient w/ poor activity tolerance as evidenced by patient requiring 4 rest breaks during ot eval.         Treatment & Education:  Patient performed all bed mobility w/ sba to cga. Patient performed lb dressing for socks while sitting on eob w/ min a to don.  Patient performed static/dynamic stand balance activities demonstrating fair/poor +during stand tasks. Patient performed simple g/h  task w/ sua. Patient was able to assist back into bed w/ cga.  Patient required frequent rest breaks during all transitional movements.   Patient left supine with all lines intact and daughter present    GOALS:   Patient will perform lb dressing ind'ly  Patient will increase her functional activity tolerance to fair+ in order to increase her ind w/ all adls.       History:     Past Medical History:   Diagnosis Date    Anemia     Constipation     Hypertension     Thyroid disease          Past Surgical History:   Procedure Laterality Date    CHOLECYSTECTOMY      HYSTERECTOMY         Time Tracking:     OT Date of Treatment:  01/29/2024  OT Start Time:  9:31   OT Stop Time:  10:00  OT Total Time (min):  29 min    Billable Minutes:Evaluation 15  Self Care/Home Management 14    1/29/2024

## 2024-01-29 NOTE — HOSPITAL COURSE
Patient admitted for acute on chronic HFrEF exacerbation with volume overload and acute hypoxic respiratory failure w/ mild troponin elevation and new LBBB on ECG. Patient not wanting any procedures or aggressive measures. Now on RA. On PO lasix. Clear medically for discharge pending TTE. PT/OT recommending SNF

## 2024-01-29 NOTE — PLAN OF CARE
01/29/24 0952   Medicare Message   Important Message from Medicare regarding Discharge Appeal Rights Signed/date by patient/caregiver   Date IMM was signed 01/27/24   Time IMM was signed 1027   OWEN Message   Medicare Outpatient and Observation Notification regarding financial responsibility Signed/date by patient/caregiver   Date OWEN was signed 01/27/24   Time OWEN was signed 1028

## 2024-01-29 NOTE — PLAN OF CARE
Problem: Adult Inpatient Plan of Care  Goal: Plan of Care Review  Outcome: Ongoing, Progressing  Goal: Patient-Specific Goal (Individualized)  Outcome: Ongoing, Progressing  Goal: Absence of Hospital-Acquired Illness or Injury  Outcome: Ongoing, Progressing  Goal: Optimal Comfort and Wellbeing  Outcome: Ongoing, Progressing  Goal: Readiness for Transition of Care  Outcome: Ongoing, Progressing     Problem: Skin Injury Risk Increased  Goal: Skin Health and Integrity  Outcome: Ongoing, Progressing     Problem: Fall Injury Risk  Goal: Absence of Fall and Fall-Related Injury  Outcome: Ongoing, Progressing  Intervention: Identify and Manage Contributors  Flowsheets (Taken 1/29/2024 0230)  Self-Care Promotion: BADL personal objects within reach  Medication Review/Management: medications reviewed     Problem: Gas Exchange Impaired  Goal: Optimal Gas Exchange  Outcome: Ongoing, Progressing  Intervention: Optimize Oxygenation and Ventilation  Flowsheets (Taken 1/29/2024 0230)  Airway/Ventilation Management:   calming measures promoted   airway patency maintained  Head of Bed (HOB) Positioning: HOB at 20-30 degrees

## 2024-01-29 NOTE — PROGRESS NOTES
[facility] Medicine  Progress Note    Patient Name: Re Arciniega  MRN: [id number]  Patient Class: IP- Inpatient   Admission Date: 1/27/2024  Length of Stay: 1 days  Attending Physician: Michel Mackay MD  Primary Care Provider: Anthony Emanuel MD        Subjective:     Principal Problem:NSTEMI (non-ST elevated myocardial infarction)        HPI:  89 yo w/ hx of HTN, Hypothyroidism, Chronic HFrEF presenting with SOB. Symptoms started acutely this am when patient was trying to get out of bed. Patient could not catch her breath and called daughter who called 911. Nothing like this before. For the past 3 days patient states she has been having pain in her left axilla. Pain comes and goes. Not associated with activity. Nothing like this before. Also having associated diaphoresis. No NV. No hx of COPD. No fever, chills, diarrhea. Patient endorses chronic LE swelling that is no worse than usual. No problems laying flat.     Overview/Hospital Course:  Patient admitted for acute on chronic HFrEF exacerbation with volume overload and acute hypoxic respiratory failure w/ mild troponin elevation and new LBBB on ECG. Patient not wanting any procedures or aggressive measures. Now on RA. On PO lasix. Clear medically for discharge pending TTE. PT/OT recommending SNF    Interval History: NAEON. TTE tomorrow. PT/OT recommending SNF    Review of Systems   All other systems reviewed and are negative.    Objective:     Vital Signs (Most Recent):  Temp: 97.9 °F (36.6 °C) (01/28/24 2334)  Pulse: 70 (01/28/24 2334)  Resp: 14 (01/28/24 2334)  BP: 115/64 (01/29/24 0943)  SpO2: 95 % (01/29/24 0826) Vital Signs (24h Range):  Temp:  [97.9 °F (36.6 °C)] 97.9 °F (36.6 °C)  Pulse:  [70] 70  Resp:  [14] 14  SpO2:  [95 %] 95 %  BP: (115-120)/(64-70) 115/64     Weight: 55 kg (121 lb 4.1 oz)  Body mass index is 19.57 kg/m².    Intake/Output Summary (Last 24 hours) at 1/29/2024 1644  Last data filed at 1/28/2024  1746  Gross per 24 hour   Intake 120 ml   Output --   Net 120 ml         Physical Exam      Vitals reviewed  General: NAD, elderly, frail  Head: NC/AT  Eyes: EOMI, KATHERINE  Cardiovascular: Pulses intact distally, Regular Rate and rhythm  Pulmonary: Normal Respiratory Rate, No respiratory distress  Gi: Soft, Non-tender  Extremities: Warm, edema resolved in bilateral LE  Skin: Warm, dry  Neuro: Alert, Oriented, No focal Deficit  Psych: Appropriate mood and affect       Significant Labs: All pertinent labs within the past 24 hours have been reviewed.    Significant Imaging: I have reviewed all pertinent imaging results/findings within the past 24 hours.    Assessment/Plan:      * NSTEMI (non-ST elevated myocardial infarction)  Elevated troponin with associated chest pain and diaphoresis  ECG w/ new LBBB  Advised patient that she may be having acute MI. She states that she would not want any procedures including LHC or aggressive measures  Patient appears to understand risk and benefits  Family in room for conversation  Continue ASA  Holding statin with mild transaminitis- can start tomorrow if lfts stable  TTE monday    Transaminitis  Mild  Possibly congestive hepatopathy  Monitor daily  Resolved      Acute respiratory failure with hypoxia  Patient with Hypoxic Respiratory failure which is Acute.  she is not on home oxygen. Supplemental oxygen was provided and noted- Oxygen Concentration (%):  [28] 28    .   Signs/symptoms of respiratory failure include- increased work of breathing. Contributing diagnoses includes - CHF Labs and images were reviewed. Patient Has not had a recent ABG. Will treat underlying causes and adjust management of respiratory failure as follows-     Treat per CHF pathway  Weaned to RA currently    Advanced care planning/counseling discussion  Had GOC conversation with patient and her family. Patient is DNR/DNI  She does not want any procedures or aggressive measures      Heart failure with reduced  ejection fraction  Acute on chronic HFrEF exacerbation  On CHF pathway  Given IV diuresis in ED. Good UOP and now on RA. Hold further doses for now  PO lasix  Keep K>4, Mg>2  Tele  Strict I's and O's  Daily weight  TTE ordered      Hypothyroidism  F/U TSH  Continue synthroid      Hypertension  Chronic  Controlled  Continue home medications as tolerated and titrate as needed  PRN Hydral if BP >180/110 and patient with significant symptoms of chest pain, nausea, AMS, SOB      VTE Risk Mitigation (From admission, onward)           Ordered     enoxaparin injection 30 mg  Daily         01/27/24 0956     IP VTE HIGH RISK PATIENT  Once         01/27/24 0956     Place sequential compression device  Until discontinued         01/27/24 0956                    Discharge Planning   ART: 1/30/2024     Code Status: DNR   Is the patient medically ready for discharge?:     Reason for patient still in hospital (select all that apply): Treatment  Discharge Plan A: Skilled Nursing Facility                  Michel Mackay MD  Department of Hospital Medicine   [place] - Intensive Care

## 2024-01-29 NOTE — PT/OT/SLP EVAL
Physical Therapy Evaluation    Patient Name:  Re Arciniega   MRN:  [id number]    Recommendations:     Discharge Recommendations: skilled nursing    Discharge Equipment Recommendations: none    Barriers to discharge: None    Assessment:     Re Arciniega is a 89 y.o. female admitted with a medical diagnosis of NSTEMI (non-ST elevated myocardial infarction).  She presents with the following impairments/functional limitations:   BLE weakness, decreased functional mobility.    Rehab Prognosis: Good; patient would benefit from acute skilled PT services to address these deficits and reach maximum level of function.    Recent Surgery: * No surgery found *      Plan:     During this hospitalization, patient to be seen   to address the identified rehab impairments via gait training, therapeutic activities, therapeutic exercises and progress toward the following goals:    Plan of Care Expires:       Subjective     Chief Complaint: weakness, unable to move  Patient/Family Comments/goals: get stronger  Pain/Comfort:       Patients cultural, spiritual, Adventism conflicts given the current situation: yes    Living Environment:  One level home with adult son with disabilities, other family members check on her daily  Prior to admission, patients level of function was min A ADL's.  Equipment used at home: walker, rolling.  DME owned (not currently used): none.  Upon discharge, patient will have assistance from family.    Objective:     Communicated with RN prior to session.  Patient found supine with    upon PT entry to room.    General Precautions: Standard,    Orthopedic Precautions:    Braces:    Respiratory Status: Room air    Exams:  RLE Strength: 3/5 throughout  LLE Strength: 3/5 throughout    Functional Mobility:  Bed Mobility:     Supine to Sit: maximal assistance  Sit to Supine: maximal assistance      AM-PAC 6 CLICK MOBILITY  Total Score:        Treatment & Education:  Benefits of PT    Patient left supine with all lines  intact and call button in reach.    GOALS:   Multidisciplinary Problems       Physical Therapy Goals       Not on file                    History:     Past Medical History:   Diagnosis Date    Anemia     Constipation     Hypertension     Thyroid disease        Past Surgical History:   Procedure Laterality Date    CHOLECYSTECTOMY      HYSTERECTOMY         Time Tracking:     PT Received On:    PT Start Time:       PT Stop Time: 1430  PT Total Time (min):       Billable Minutes: Evaluation 15      01/29/2024

## 2024-01-30 LAB
ALBUMIN SERPL BCP-MCNC: 3.3 G/DL (ref 3.5–5.2)
ALP SERPL-CCNC: 54 U/L (ref 55–135)
ALT SERPL W/O P-5'-P-CCNC: 20 U/L (ref 10–44)
ANION GAP SERPL CALC-SCNC: 11 MMOL/L (ref 8–16)
AORTIC ROOT ANNULUS: 2.42 CM
AORTIC VALVE CUSP SEPERATION: 0.83 CM
ASCENDING AORTA: 2.7 CM
AST SERPL-CCNC: 18 U/L (ref 10–40)
AV INDEX (PROSTH): 0.37
AV MEAN GRADIENT: 9 MMHG
AV PEAK GRADIENT: 15 MMHG
AV REGURGITATION PRESSURE HALF TIME: 490.66 MS
AV VALVE AREA BY VELOCITY RATIO: 0.74 CM²
AV VALVE AREA: 0.92 CM²
AV VELOCITY RATIO: 0.3
BASOPHILS # BLD AUTO: 0.03 K/UL (ref 0–0.2)
BASOPHILS NFR BLD: 0.9 % (ref 0–1.9)
BILIRUB SERPL-MCNC: 0.7 MG/DL (ref 0.1–1)
BSA FOR ECHO PROCEDURE: 1.66 M2
BUN SERPL-MCNC: 25 MG/DL (ref 10–30)
CALCIUM SERPL-MCNC: 8.9 MG/DL (ref 8.7–10.5)
CHLORIDE SERPL-SCNC: 103 MMOL/L (ref 95–110)
CO2 SERPL-SCNC: 25 MMOL/L (ref 23–29)
CREAT SERPL-MCNC: 0.9 MG/DL (ref 0.5–1.4)
CV ECHO LV RWT: 0.64 CM
DIFFERENTIAL METHOD BLD: ABNORMAL
DOP CALC AO PEAK VEL: 1.96 M/S
DOP CALC AO VTI: 33.6 CM
DOP CALC LVOT AREA: 2.5 CM2
DOP CALC LVOT DIAMETER: 1.79 CM
DOP CALC LVOT PEAK VEL: 0.58 M/S
DOP CALC LVOT STROKE VOLUME: 30.94 CM3
DOP CALC MV VTI: 29.7 CM
DOP CALCLVOT PEAK VEL VTI: 12.3 CM
E WAVE DECELERATION TIME: 143.02 MSEC
E/A RATIO: 0.65
E/E' RATIO: 18.75 M/S
ECHO LV POSTERIOR WALL: 1.28 CM (ref 0.6–1.1)
EJECTION FRACTION: 20 %
EOSINOPHIL # BLD AUTO: 0.2 K/UL (ref 0–0.5)
EOSINOPHIL NFR BLD: 5.1 % (ref 0–8)
ERYTHROCYTE [DISTWIDTH] IN BLOOD BY AUTOMATED COUNT: 13.3 % (ref 11.5–14.5)
EST. GFR  (NO RACE VARIABLE): 59.6 ML/MIN/1.73 M^2
FRACTIONAL SHORTENING: 12 % (ref 28–44)
GLOBAL LONGITUIDAL STRAIN: -7 %
GLUCOSE SERPL-MCNC: 73 MG/DL (ref 70–110)
HCT VFR BLD AUTO: 33.2 % (ref 37–48.5)
HGB BLD-MCNC: 11.1 G/DL (ref 12–16)
IMM GRANULOCYTES # BLD AUTO: 0 K/UL (ref 0–0.04)
IMM GRANULOCYTES NFR BLD AUTO: 0 % (ref 0–0.5)
INTERVENTRICULAR SEPTUM: 1.28 CM (ref 0.6–1.1)
IVC DIAMETER: 1.35 CM
IVRT: 26.64 MSEC
LA MAJOR: 4.5 CM
LA MINOR: 3.68 CM
LEFT ATRIUM SIZE: 4.76 CM
LEFT ATRIUM VOLUME INDEX MOD: 16.9 ML/M2
LEFT ATRIUM VOLUME MOD: 28.24 CM3
LEFT INTERNAL DIMENSION IN SYSTOLE: 3.53 CM (ref 2.1–4)
LEFT VENTRICLE DIASTOLIC VOLUME INDEX: 41.69 ML/M2
LEFT VENTRICLE DIASTOLIC VOLUME: 69.62 ML
LEFT VENTRICLE MASS INDEX: 109 G/M2
LEFT VENTRICLE SYSTOLIC VOLUME INDEX: 31.1 ML/M2
LEFT VENTRICLE SYSTOLIC VOLUME: 51.93 ML
LEFT VENTRICULAR INTERNAL DIMENSION IN DIASTOLE: 3.99 CM (ref 3.5–6)
LEFT VENTRICULAR MASS: 181.55 G
LV LATERAL E/E' RATIO: 15 M/S
LV SEPTAL E/E' RATIO: 25 M/S
LVOT MG: 0.77 MMHG
LVOT MV: 0.41 CM/S
LYMPHOCYTES # BLD AUTO: 1 K/UL (ref 1–4.8)
LYMPHOCYTES NFR BLD: 31 % (ref 18–48)
MAGNESIUM SERPL-MCNC: 1.5 MG/DL (ref 1.6–2.6)
MCH RBC QN AUTO: 31.9 PG (ref 27–31)
MCHC RBC AUTO-ENTMCNC: 33.4 G/DL (ref 32–36)
MCV RBC AUTO: 95 FL (ref 82–98)
MONOCYTES # BLD AUTO: 0.5 K/UL (ref 0.3–1)
MONOCYTES NFR BLD: 14 % (ref 4–15)
MV MEAN GRADIENT: 3 MMHG
MV PEAK A VEL: 1.16 M/S
MV PEAK E VEL: 0.75 M/S
MV PEAK GRADIENT: 8 MMHG
MV STENOSIS PRESSURE HALF TIME: 53.66 MS
MV VALVE AREA BY CONTINUITY EQUATION: 1.04 CM2
MV VALVE AREA P 1/2 METHOD: 4.1 CM2
NEUTROPHILS # BLD AUTO: 1.6 K/UL (ref 1.8–7.7)
NEUTROPHILS NFR BLD: 49 % (ref 38–73)
NRBC BLD-RTO: 0 /100 WBC
PISA AR MAX VEL: 3.87 M/S
PISA MRMAX VEL: 4.57 M/S
PISA TR MAX VEL: 2.84 M/S
PLATELET # BLD AUTO: 148 K/UL (ref 150–450)
PMV BLD AUTO: 10 FL (ref 9.2–12.9)
POTASSIUM SERPL-SCNC: 4.5 MMOL/L (ref 3.5–5.1)
PROT SERPL-MCNC: 6.3 G/DL (ref 6–8.4)
PV MV: 0.54 M/S
PV PEAK GRADIENT: 2 MMHG
PV PEAK VELOCITY: 0.74 M/S
RA MAJOR: 3.66 CM
RA PRESSURE ESTIMATED: 3 MMHG
RA WIDTH: 3.16 CM
RBC # BLD AUTO: 3.48 M/UL (ref 4–5.4)
RIGHT VENTRICULAR END-DIASTOLIC DIMENSION: 2.42 CM
RIGHT VENTRICULAR LENGTH IN DIASTOLE (APICAL 4-CHAMBER VIEW): 4.94 CM
RV MID DIAMA: 1.35 CM
RV TB RVSP: 6 MMHG
SINUS: 3.01 CM
SODIUM SERPL-SCNC: 139 MMOL/L (ref 136–145)
STJ: 2.77 CM
TDI LATERAL: 0.05 M/S
TDI SEPTAL: 0.03 M/S
TDI: 0.04 M/S
TR MAX PG: 32 MMHG
TRICUSPID ANNULAR PLANE SYSTOLIC EXCURSION: 1.8 CM
TRICUSPID VALVE PEAK A WAVE VELOCITY: 0.49 M/S
TV PEAK E VEL: 0.5 M/S
TV REST PULMONARY ARTERY PRESSURE: 35 MMHG
WBC # BLD AUTO: 3.35 K/UL (ref 3.9–12.7)
Z-SCORE OF LEFT VENTRICULAR DIMENSION IN END DIASTOLE: -1.57
Z-SCORE OF LEFT VENTRICULAR DIMENSION IN END SYSTOLE: 1.57

## 2024-01-30 PROCEDURE — 97530 THERAPEUTIC ACTIVITIES: CPT

## 2024-01-30 PROCEDURE — 11000001 HC ACUTE MED/SURG PRIVATE ROOM

## 2024-01-30 PROCEDURE — 63600175 PHARM REV CODE 636 W HCPCS: Performed by: STUDENT IN AN ORGANIZED HEALTH CARE EDUCATION/TRAINING PROGRAM

## 2024-01-30 PROCEDURE — 97535 SELF CARE MNGMENT TRAINING: CPT

## 2024-01-30 PROCEDURE — 83735 ASSAY OF MAGNESIUM: CPT | Performed by: STUDENT IN AN ORGANIZED HEALTH CARE EDUCATION/TRAINING PROGRAM

## 2024-01-30 PROCEDURE — 85025 COMPLETE CBC W/AUTO DIFF WBC: CPT | Performed by: STUDENT IN AN ORGANIZED HEALTH CARE EDUCATION/TRAINING PROGRAM

## 2024-01-30 PROCEDURE — 25000003 PHARM REV CODE 250: Performed by: STUDENT IN AN ORGANIZED HEALTH CARE EDUCATION/TRAINING PROGRAM

## 2024-01-30 PROCEDURE — 80053 COMPREHEN METABOLIC PANEL: CPT | Performed by: STUDENT IN AN ORGANIZED HEALTH CARE EDUCATION/TRAINING PROGRAM

## 2024-01-30 RX ADMIN — ASPIRIN 81 MG: 81 TABLET, CHEWABLE ORAL at 09:01

## 2024-01-30 RX ADMIN — ENOXAPARIN SODIUM 30 MG: 30 INJECTION SUBCUTANEOUS at 04:01

## 2024-01-30 RX ADMIN — FUROSEMIDE 20 MG: 20 TABLET ORAL at 09:01

## 2024-01-30 RX ADMIN — LEVOTHYROXINE SODIUM 88 MCG: 88 TABLET ORAL at 06:01

## 2024-01-30 RX ADMIN — POLYETHYLENE GLYCOL (3350) 17 G: 17 POWDER, FOR SOLUTION ORAL at 09:01

## 2024-01-30 RX ADMIN — MUPIROCIN: 20 OINTMENT TOPICAL at 09:01

## 2024-01-30 RX ADMIN — MUPIROCIN: 20 OINTMENT TOPICAL at 08:01

## 2024-01-30 NOTE — PLAN OF CARE
[facility]  Discharge Reassessment    Primary Care Provider: Anthony Emanuel MD    Expected Discharge Date: 1/30/2024    Patient notified that [facility] can take her for rehab once she is medically ready for discharge. Explained to her that it may be tomorrow when she may get to go. Demonstrated understanding. Will continue to follow.      Reassessment (most recent)       Discharge Reassessment - 01/30/24 0795          Discharge Reassessment    Assessment Type Discharge Planning Reassessment     Did the patient's condition or plan change since previous assessment? No     Discharge Plan discussed with: Patient     Communicated ART with patient/caregiver Yes     Discharge Plan A Skilled Nursing Facility     Discharge Plan B Skilled Nursing Facility     DME Needed Upon Discharge  none     Transition of Care Barriers None        Post-Acute Status    Post-Acute Authorization Placement     Post-Acute Placement Status Pending medical clearance/testing     Discharge Delays None known at this time

## 2024-01-30 NOTE — PLAN OF CARE
01/30/24 1615   Medicare Message   Important Message from Medicare regarding Discharge Appeal Rights Given to patient/caregiver;Explained to patient/caregiver;Signed/date by patient/caregiver   Date IMM was signed 01/30/24   Time IMM was signed 1619

## 2024-01-30 NOTE — PLAN OF CARE
Problem: Adult Inpatient Plan of Care  Goal: Plan of Care Review  Outcome: Ongoing, Progressing  Goal: Patient-Specific Goal (Individualized)  Outcome: Ongoing, Progressing  Goal: Absence of Hospital-Acquired Illness or Injury  Outcome: Ongoing, Progressing  Goal: Optimal Comfort and Wellbeing  Outcome: Ongoing, Progressing  Goal: Readiness for Transition of Care  Outcome: Ongoing, Progressing     Problem: Skin Injury Risk Increased  Goal: Skin Health and Integrity  Outcome: Ongoing, Progressing     Problem: Fall Injury Risk  Goal: Absence of Fall and Fall-Related Injury  Outcome: Ongoing, Progressing     Problem: Gas Exchange Impaired  Goal: Optimal Gas Exchange  Outcome: Ongoing, Progressing     Problem: Adjustment to Illness (Acute Coronary Syndrome)  Goal: Optimal Adaptation to Illness  Outcome: Ongoing, Progressing     Problem: Dysrhythmia (Acute Coronary Syndrome)  Goal: Normalized Cardiac Rhythm  Outcome: Ongoing, Progressing     Problem: Cardiac-Related Pain (Acute Coronary Syndrome)  Goal: Absence of Cardiac-Related Pain  Outcome: Ongoing, Progressing     Problem: Hemodynamic Instability (Acute Coronary Syndrome)  Goal: Effective Cardiac Pump Function  Outcome: Ongoing, Progressing     Problem: Tissue Perfusion (Acute Coronary Syndrome)  Goal: Adequate Tissue Perfusion  Outcome: Ongoing, Progressing     Problem: Adjustment to Illness (Heart Failure)  Goal: Optimal Coping  Outcome: Ongoing, Progressing     Problem: Cardiac Output Decreased (Heart Failure)  Goal: Optimal Cardiac Output  Outcome: Ongoing, Progressing     Problem: Dysrhythmia (Heart Failure)  Goal: Stable Heart Rate and Rhythm  Outcome: Ongoing, Progressing     Problem: Fluid Imbalance (Heart Failure)  Goal: Fluid Balance  Outcome: Ongoing, Progressing     Problem: Functional Ability Impaired (Heart Failure)  Goal: Optimal Functional Ability  Outcome: Ongoing, Progressing     Problem: Oral Intake Inadequate (Heart Failure)  Goal: Optimal  Nutrition Intake  Outcome: Ongoing, Progressing     Problem: Respiratory Compromise (Heart Failure)  Goal: Effective Oxygenation and Ventilation  Outcome: Ongoing, Progressing     Problem: Sleep Disordered Breathing (Heart Failure)  Goal: Effective Breathing Pattern During Sleep  Outcome: Ongoing, Progressing   POC reviewed at bedside. Questions and concerns addressed. VSS. Placed bed in low and locked position. Call light within reach. Side rails up x2. Instructed to call for any needs. Verbalized understanding of all instructions. Frequent rounds.

## 2024-01-31 VITALS
DIASTOLIC BLOOD PRESSURE: 59 MMHG | TEMPERATURE: 99 F | HEIGHT: 66 IN | SYSTOLIC BLOOD PRESSURE: 116 MMHG | WEIGHT: 126.13 LBS | BODY MASS INDEX: 20.27 KG/M2 | HEART RATE: 74 BPM | OXYGEN SATURATION: 96 % | RESPIRATION RATE: 16 BRPM

## 2024-01-31 PROCEDURE — 25000003 PHARM REV CODE 250: Performed by: STUDENT IN AN ORGANIZED HEALTH CARE EDUCATION/TRAINING PROGRAM

## 2024-01-31 PROCEDURE — 97530 THERAPEUTIC ACTIVITIES: CPT

## 2024-01-31 RX ORDER — NAPROXEN SODIUM 220 MG/1
81 TABLET, FILM COATED ORAL DAILY
Qty: 30 TABLET | Refills: 2 | Status: SHIPPED | OUTPATIENT
Start: 2024-02-01 | End: 2024-04-12 | Stop reason: SDUPTHER

## 2024-01-31 RX ORDER — FERROUS SULFATE 325(65) MG
325 TABLET ORAL DAILY
Qty: 30 TABLET | Refills: 11 | Status: SHIPPED | OUTPATIENT
Start: 2024-01-31

## 2024-01-31 RX ORDER — CYANOCOBALAMIN 1000 UG/ML
1000 INJECTION, SOLUTION INTRAMUSCULAR; SUBCUTANEOUS
Qty: 1 ML | Refills: 11 | Status: SHIPPED | OUTPATIENT
Start: 2024-01-31 | End: 2024-03-13

## 2024-01-31 RX ORDER — LOSARTAN POTASSIUM 100 MG/1
100 TABLET ORAL DAILY
Qty: 30 TABLET | Refills: 11 | Status: SHIPPED | OUTPATIENT
Start: 2024-01-31

## 2024-01-31 RX ORDER — TELMISARTAN 80 MG/1
80 TABLET ORAL DAILY
Qty: 30 TABLET | Refills: 11 | Status: SHIPPED | OUTPATIENT
Start: 2024-01-31 | End: 2024-01-31 | Stop reason: HOSPADM

## 2024-01-31 RX ORDER — LEVOTHYROXINE SODIUM 88 UG/1
88 TABLET ORAL
Qty: 30 TABLET | Refills: 11 | Status: SHIPPED | OUTPATIENT
Start: 2024-01-31

## 2024-01-31 RX ORDER — HYDROCHLOROTHIAZIDE 25 MG/1
25 TABLET ORAL DAILY
Qty: 30 TABLET | Refills: 2 | Status: SHIPPED | OUTPATIENT
Start: 2024-01-31 | End: 2024-03-13 | Stop reason: ALTCHOICE

## 2024-01-31 RX ORDER — IRON,CARBONYL/ASCORBIC ACID 100-250 MG
1 TABLET ORAL DAILY
Qty: 30 TABLET | Refills: 0 | Status: SHIPPED | OUTPATIENT
Start: 2024-01-31

## 2024-01-31 RX ORDER — POTASSIUM CHLORIDE 750 MG/1
10 TABLET, EXTENDED RELEASE ORAL DAILY
Qty: 30 TABLET | Refills: 11 | Status: SHIPPED | OUTPATIENT
Start: 2024-01-31 | End: 2024-03-13

## 2024-01-31 RX ORDER — CLOBETASOL PROPIONATE 0.5 MG/G
OINTMENT TOPICAL 2 TIMES DAILY
Qty: 60 G | Refills: 1 | Status: SHIPPED | OUTPATIENT
Start: 2024-01-31 | End: 2024-03-01

## 2024-01-31 RX ORDER — CARVEDILOL 3.12 MG/1
3.12 TABLET ORAL 2 TIMES DAILY
Qty: 60 TABLET | Refills: 11 | Status: SHIPPED | OUTPATIENT
Start: 2024-01-31 | End: 2024-03-13

## 2024-01-31 RX ADMIN — ASPIRIN 81 MG: 81 TABLET, CHEWABLE ORAL at 09:01

## 2024-01-31 RX ADMIN — FUROSEMIDE 20 MG: 20 TABLET ORAL at 09:01

## 2024-01-31 RX ADMIN — MUPIROCIN: 20 OINTMENT TOPICAL at 09:01

## 2024-01-31 RX ADMIN — LEVOTHYROXINE SODIUM 88 MCG: 88 TABLET ORAL at 06:01

## 2024-01-31 NOTE — PLAN OF CARE
DC orders sent to Ayad sanchez for review     01/31/24 1316   Post-Acute Status   Post-Acute Authorization Placement   Post-Acute Placement Status Pending post-acute provider review/more information requested

## 2024-01-31 NOTE — DISCHARGE INSTRUCTIONS
[facility]     FACILITY TRANSFER ORDERS        Patient Name: Re Arciniega  YOB: 1930     PCP: Anthony Emanuel MD   PCP Address: [address] [place] [address] / [facility] [address]  PCP Phone Number: [phone]  PCP Fax: [phone]     Encounter Date: 01/31/2024     Admit to: [facility]     Vital Signs:  Routine     Diagnoses:          Active Hospital Problems     Diagnosis   POA    *Acute respiratory failure with hypoxia [J96.01]   Yes       Priority: 1 - High    NSTEMI (non-ST elevated myocardial infarction) [I21.4]   Yes       Priority: 2     Hypertension [I10]   Yes    Hypothyroidism [E03.9]   Yes    Heart failure with reduced ejection fraction [I50.20]   Yes    Advanced care planning/counseling discussion [Z71.89]   Not Applicable    Transaminitis [R74.01]   Yes       Resolved Hospital Problems   No resolved problems to display.         Allergies:Review of patient's allergies indicates:  No Known Allergies     Diet: cardiac diet     Activities: Activity as tolerated     Goals of Care Treatment Preferences:  Code Status: DNR        Nursing: Routine      Labs: CBC and CMP  monthly       CONSULTS:  PT, OT, Speech        MISCELLANEOUS CARE:  Routine Skin for Bedridden Patients: Apply moisture barrier cream to all skin folds and wet areas in perineal area daily and after baths and all bowel movements.     WOUND CARE ORDERS  None     Medications: Review discharge medications with patient and family and provide education.        Medications: Discontinue all previous medication orders, if any. See new list below.  Current Discharge Medication List        START taking these medications    Details   aspirin 81 MG Chew Take 1 tablet (81 mg total) by mouth once daily.  Qty: 30 tablet, Refills: 2      carvediloL (COREG) 3.125 MG tablet Take 1 tablet (3.125 mg total) by mouth 2 (two) times daily.  Qty: 60 tablet, Refills: 11    Comments: .           CONTINUE these medications which have CHANGED    Details    clobetasol 0.05% (TEMOVATE) 0.05 % Oint Apply topically 2 (two) times daily. apply to heels  Qty: 60 g, Refills: 1      cyanocobalamin 1,000 mcg/mL injection Inject 1 mL (1,000 mcg total) into the muscle every 30 days.  Qty: 1 mL, Refills: 11      ferrous sulfate (FEOSOL) 325 mg (65 mg iron) Tab tablet Take 1 tablet (325 mg total) by mouth once daily.  Qty: 30 tablet, Refills: 11      hydroCHLOROthiazide (HYDRODIURIL) 25 MG tablet Take 1 tablet (25 mg total) by mouth once daily.  Qty: 30 tablet, Refills: 2    Comments: .      iron-vitamin C 100-250 mg, ICAR-C, 100-250 mg Tab Take 1 tablet by mouth once daily.  Qty: 30 tablet, Refills: 0      levothyroxine (SYNTHROID) 88 MCG tablet Take 1 tablet (88 mcg total) by mouth before breakfast.  Qty: 30 tablet, Refills: 11      losartan (COZAAR) 100 MG tablet Take 1 tablet (100 mg total) by mouth once daily.  Qty: 30 tablet, Refills: 11    Comments: .      potassium chloride SA (K-DUR,KLOR-CON M) 10 MEQ tablet Take 1 tablet (10 mEq total) by mouth once daily.  Qty: 30 tablet, Refills: 11      telmisartan (MICARDIS) 80 MG Tab Take 1 tablet (80 mg total) by mouth once daily.  Qty: 30 tablet, Refills: 11    Comments: .           CONTINUE these medications which have NOT CHANGED    Details   docusate sodium (COLACE) 50 MG capsule Take by mouth 2 (two) times daily.      polyethylene glycol (GLYCOLAX) 17 gram PwPk Take 17 g by mouth once daily.  Qty: 30 packet, Refills: 0           STOP taking these medications       erythromycin (ROMYCIN) ophthalmic ointment Comments:   Reason for Stopping:             Follow up:    Follow-up Information       Anthony Emanuel MD Follow up.    Specialty: Family Medicine  Contact information:  [address]  [facility] [address] [phone]                               Immunizations Administered as of 1/31/2024       No immunizations on file.

## 2024-01-31 NOTE — PLAN OF CARE
Pt clear for DC from case management standpoint. Discharging to [facility] ([place], [place])       01/31/24 1444   Final Note   Assessment Type Final Discharge Note   Anticipated Discharge Disposition SNF

## 2024-01-31 NOTE — ASSESSMENT & PLAN NOTE
Elevated troponin with associated chest pain and diaphoresis  ECG w/ new LBBB  Advised patient that she may be having acute MI. She states that she would not want any procedures including LHC or aggressive measures  Patient appears to understand risk and benefits  Family in room for conversation  Continue ASA  Holding statin with mild transaminitis- can start tomorrow if lfts stable  TTE Monday 1/30/24  Patient awaiting results of TTE  Ready for discharge awaiting results  Probable D/C tomorrow    01/31/2024  Transthoracic echocardiogram was completed on 01/30/2024 results are as follows:      Left Ventricle: The left ventricle is normal in size. Mildly increased wall thickness. Severe global hypokinesis present. There is severely reduced systolic function with a visually estimated ejection fraction of 20 - 25%. Ejection fraction by visual approximation is 20%. Global longitudinal strain is -7%. Reduced. There is diastolic dysfunction.    Right Ventricle: Normal right ventricular cavity size. Systolic function is severely reduced. TAPSE is 1.80 cm.    Left Atrium: Left atrium is moderately dilated.    Aortic Valve: The aortic valve is a trileaflet valve. Severely restricted motion. There is severe stenosis. Aortic valve area by VTI is 0.92 cm². Aortic valve peak velocity is 1.96 m/s. Mean gradient is 9 mmHg. The dimensionless index is 0.37. There is mild aortic regurgitation.    Mitral Valve: There is moderate regurgitation.    Tricuspid Valve: There is mild regurgitation.    Pulmonic Valve: There is mild regurgitation.    IVC/SVC: Normal venous pressure at 3 mmHg.    Pericardium: There is a small effusion. No indication of cardiac tamponade.    Significant deterioation of LV function from LVEF of 45% in Echo of 7/2023. There is also progression of LAE.    Patient will be discharged back to [facility] for continued care.

## 2024-01-31 NOTE — ASSESSMENT & PLAN NOTE
Patient with Hypoxic Respiratory failure which is Acute.  she is not on home oxygen. Supplemental oxygen was provided and noted-      .   Signs/symptoms of respiratory failure include- increased work of breathing. Contributing diagnoses includes - CHF Labs and images were reviewed. Patient Has not had a recent ABG. Will treat underlying causes and adjust management of respiratory failure as follows-     Treat per CHF pathway  Weaned to RA currently    01/31/2024:   Patient is currently on room air in having no other shortness of breath or complaints  Labs most recently show no significant abnormalities with stable hemoglobin hematocrit white blood cell count 3.3 in BUN and creatinine were 25 and 0.9.  Patient will return to [facility] to continue her convalescence.  Medicine reconciliation has completed in patient will remain on current medicines and added carvedilol 3.125 mg b.i.d. for congestive heart failure treatment.

## 2024-01-31 NOTE — PLAN OF CARE
Problem: Adult Inpatient Plan of Care  Goal: Plan of Care Review  Outcome: Ongoing, Progressing  Goal: Patient-Specific Goal (Individualized)  Outcome: Ongoing, Progressing  Goal: Absence of Hospital-Acquired Illness or Injury  Outcome: Ongoing, Progressing  Goal: Optimal Comfort and Wellbeing  Outcome: Ongoing, Progressing  Goal: Readiness for Transition of Care  Outcome: Ongoing, Progressing     Problem: Skin Injury Risk Increased  Goal: Skin Health and Integrity  Outcome: Ongoing, Progressing     Problem: Fall Injury Risk  Goal: Absence of Fall and Fall-Related Injury  Outcome: Ongoing, Progressing     Problem: Fall Injury Risk  Goal: Absence of Fall and Fall-Related Injury  Outcome: Ongoing, Progressing     Problem: Gas Exchange Impaired  Goal: Optimal Gas Exchange  Outcome: Ongoing, Progressing     Problem: Adjustment to Illness (Acute Coronary Syndrome)  Goal: Optimal Adaptation to Illness  Outcome: Ongoing, Progressing     Problem: Dysrhythmia (Acute Coronary Syndrome)  Goal: Normalized Cardiac Rhythm  Outcome: Ongoing, Progressing     Problem: Cardiac-Related Pain (Acute Coronary Syndrome)  Goal: Absence of Cardiac-Related Pain  Outcome: Ongoing, Progressing     Problem: Hemodynamic Instability (Acute Coronary Syndrome)  Goal: Effective Cardiac Pump Function  Outcome: Ongoing, Progressing     Problem: Tissue Perfusion (Acute Coronary Syndrome)  Goal: Adequate Tissue Perfusion  Outcome: Ongoing, Progressing     Problem: Adjustment to Illness (Heart Failure)  Goal: Optimal Coping  Outcome: Ongoing, Progressing     Problem: Cardiac Output Decreased (Heart Failure)  Goal: Optimal Cardiac Output  Outcome: Ongoing, Progressing     Problem: Dysrhythmia (Heart Failure)  Goal: Stable Heart Rate and Rhythm  Outcome: Ongoing, Progressing     Problem: Functional Ability Impaired (Heart Failure)  Goal: Optimal Functional Ability  Outcome: Ongoing, Progressing     Problem: Oral Intake Inadequate (Heart Failure)  Goal:  Optimal Nutrition Intake  Outcome: Ongoing, Progressing     Problem: Respiratory Compromise (Heart Failure)  Goal: Effective Oxygenation and Ventilation  Outcome: Ongoing, Progressing     Problem: Sleep Disordered Breathing (Heart Failure)  Goal: Effective Breathing Pattern During Sleep  Outcome: Ongoing, Progressing   POC reviewed at bedside. Questions and concerns addressed. VSS. Placed bed in low and locked position. Call light within reach. Side rails up x2. Instructed to call for any needs. Verbalized understanding of all instructions. Frequent rounds.

## 2024-01-31 NOTE — PT/OT/SLP PROGRESS
Physical Therapy Treatment    Patient Name:  Re Arciniega   MRN:  [id number]    Recommendations:     Discharge Recommendations:  skilled nursing facility  Discharge Equipment Recommendations:    Barriers to discharge: None    Assessment:     Re Arciniega is a 89 y.o. female admitted with a medical diagnosis of NSTEMI (non-ST elevated myocardial infarction).  She presents with the following impairments/functional limitations:   weakness.    Rehab Prognosis: Good; patient would benefit from acute skilled PT services to address these deficits and reach maximum level of function.    Recent Surgery: * No surgery found *      Plan:     During this hospitalization, patient to be seen   to address the identified rehab impairments via gait training, therapeutic activities, therapeutic exercises and progress toward the following goals:    Plan of Care Expires:       Subjective     Chief Complaint: weakness  Patient/Family Comments/goals: get stronger  Pain/Comfort:  none       Objective:     Communicated with nursing prior to session.  Patient found HOB elevated with   upon PT entry to room.     General Precautions: Standard,    Orthopedic Precautions: none   Braces: none   Respiratory Status: Room air     Functional Mobility:  Bed Mobility:     Supine to Sit: stand by assistance  Sit to Supine: stand by assistance  Transfers:     Sit to Stand:  minimum assistance with rolling walker      AM-PAC 6 CLICK MOBILITY          Treatment & Education:  Performed supine to sit with SBA.  Performed sit to stand RW Min A.  Patient assisted to sitting edge of bed.  Performed BLE LAQ's, AP's, and marching x 10 reps.  Patient assisted to supine.    Patient left HOB elevated with all lines intact and call button in reach..    GOALS:   Multidisciplinary Problems       Physical Therapy Goals       Not on file                    Time Tracking:     PT Received On:  1/31/24  PT Start Time:   8:45    PT Stop Time: 9:00   PT Total Time (min):  15      Billable Minutes: Therapeutic Activity 15                   01/31/2024

## 2024-01-31 NOTE — ASSESSMENT & PLAN NOTE
Elevated troponin with associated chest pain and diaphoresis  ECG w/ new LBBB  Advised patient that she may be having acute MI. She states that she would not want any procedures including LHC or aggressive measures  Patient appears to understand risk and benefits  Family in room for conversation  Continue ASA  Holding statin with mild transaminitis- can start tomorrow if lfts stable  TTE Monday 1/30/24  Patient awaiting results of TTE  Ready for discharge awaiting results  Probable D/C tomorrow

## 2024-01-31 NOTE — PLAN OF CARE
[facility]    FACILITY TRANSFER ORDERS      Patient Name: Re Arciniega  YOB: 1930    PCP: Anthony Emanuel MD   PCP Address: [address] [place] [address] / [facility] [address]  PCP Phone Number: [phone]  PCP Fax: [phone]    Encounter Date: 01/31/2024    Admit to: [facility]    Vital Signs:  Routine    Diagnoses:   Active Hospital Problems    Diagnosis  POA    *Acute respiratory failure with hypoxia [J96.01]  Yes     Priority: 1 - High    NSTEMI (non-ST elevated myocardial infarction) [I21.4]  Yes     Priority: 2     Hypertension [I10]  Yes    Hypothyroidism [E03.9]  Yes    Heart failure with reduced ejection fraction [I50.20]  Yes    Advanced care planning/counseling discussion [Z71.89]  Not Applicable    Transaminitis [R74.01]  Yes      Resolved Hospital Problems   No resolved problems to display.       Allergies:Review of patient's allergies indicates:  No Known Allergies    Diet: cardiac diet    Activities: Activity as tolerated    Goals of Care Treatment Preferences:  Code Status: DNR      Nursing: Routine     Labs: CBC and CMP  monthly      CONSULTS:  PT, OT, Speech      MISCELLANEOUS CARE:  Routine Skin for Bedridden Patients: Apply moisture barrier cream to all skin folds and wet areas in perineal area daily and after baths and all bowel movements.    WOUND CARE ORDERS  None    Medications: Review discharge medications with patient and family and provide education.      Current Discharge Medication List        START taking these medications    Details   aspirin 81 MG Chew Take 1 tablet (81 mg total) by mouth once daily.  Qty: 30 tablet, Refills: 2      carvediloL (COREG) 3.125 MG tablet Take 1 tablet (3.125 mg total) by mouth 2 (two) times daily.  Qty: 60 tablet, Refills: 11    Comments: .           CONTINUE these medications which have CHANGED    Details   cyanocobalamin 1,000 mcg/mL injection Inject 1 mL (1,000 mcg total) into the muscle every 30 days.  Qty: 1 mL, Refills: 11       ferrous sulfate (FEOSOL) 325 mg (65 mg iron) Tab tablet Take 1 tablet (325 mg total) by mouth once daily.  Qty: 30 tablet, Refills: 11      hydroCHLOROthiazide (HYDRODIURIL) 25 MG tablet Take 1 tablet (25 mg total) by mouth once daily.  Qty: 30 tablet, Refills: 2    Comments: .      iron-vitamin C 100-250 mg, ICAR-C, 100-250 mg Tab Take 1 tablet by mouth once daily.  Qty: 30 tablet, Refills: 0      levothyroxine (SYNTHROID) 88 MCG tablet Take 1 tablet (88 mcg total) by mouth before breakfast.  Qty: 30 tablet, Refills: 11      losartan (COZAAR) 100 MG tablet Take 1 tablet (100 mg total) by mouth once daily.  Qty: 30 tablet, Refills: 11    Comments: .      potassium chloride SA (K-DUR,KLOR-CON M) 10 MEQ tablet Take 1 tablet (10 mEq total) by mouth once daily.  Qty: 30 tablet, Refills: 11      telmisartan (MICARDIS) 80 MG Tab Take 1 tablet (80 mg total) by mouth once daily.  Qty: 30 tablet, Refills: 11    Comments: .           CONTINUE these medications which have NOT CHANGED    Details   clobetasol 0.05% (TEMOVATE) 0.05 % Oint Apply topically 2 (two) times daily. Once daily to heels  Qty: 60 g, Refills: 1      docusate sodium (COLACE) 50 MG capsule Take by mouth 2 (two) times daily.      erythromycin (ROMYCIN) ophthalmic ointment       polyethylene glycol (GLYCOLAX) 17 gram PwPk Take 17 g by mouth once daily.  Qty: 30 packet, Refills: 0                Immunizations Administered as of 1/31/2024       No immunizations on file.            This patient has had both covid vaccinations    Some patients may experience side effects after vaccination.  These may include fever, headache, muscle or joint aches.  Most symptoms resolve with 24-48 hours and do not require urgent medical evaluation unless they persist for more than 72 hours or symptoms are concerning for an unrelated medical condition.          _________________________________  Yg Talavera MD  01/31/2024

## 2024-01-31 NOTE — NURSING
D/C instructions provided and explained to patient and patient's daughters, IV removed, catheter intact. Tolerated well. Telemetry monitor removed and returned to monitor room. VSS. Pt denies complaints. Transported off unit via wheelchair. Gave report to Ayad and sent discharge papers with patient.

## 2024-01-31 NOTE — ASSESSMENT & PLAN NOTE
Chronic  Controlled  Continue home medications as tolerated and titrate as needed  PRN Hydral if BP >180/110 and patient with significant symptoms of chest pain, nausea, AMS, SOB    01/31/2024   Blood pressure controlled and we will follow at [facility] with current medication

## 2024-01-31 NOTE — PROGRESS NOTES
[facility] Medicine  Progress Note    Patient Name: Re Arciniega  MRN: [id number]  Patient Class: IP- Inpatient   Admission Date: 1/27/2024  Length of Stay: 3 days  Attending Physician: Yg Talavera MD  Primary Care Provider: Anthony Emanuel MD        Subjective:     Principal Problem:NSTEMI (non-ST elevated myocardial infarction)        HPI:  89 yo w/ hx of HTN, Hypothyroidism, Chronic HFrEF presenting with SOB. Symptoms started acutely this am when patient was trying to get out of bed. Patient could not catch her breath and called daughter who called 911. Nothing like this before. For the past 3 days patient states she has been having pain in her left axilla. Pain comes and goes. Not associated with activity. Nothing like this before. Also having associated diaphoresis. No NV. No hx of COPD. No fever, chills, diarrhea. Patient endorses chronic LE swelling that is no worse than usual. No problems laying flat.     Overview/Hospital Course:  Patient admitted for acute on chronic HFrEF exacerbation with volume overload and acute hypoxic respiratory failure w/ mild troponin elevation and new LBBB on ECG. Patient not wanting any procedures or aggressive measures. Now on RA. On PO lasix. Clear medically for discharge pending TTE. PT/OT recommending SNF    No new subjective & objective note has been filed under this hospital service since the last note was generated.      Assessment/Plan:      * NSTEMI (non-ST elevated myocardial infarction)  Elevated troponin with associated chest pain and diaphoresis  ECG w/ new LBBB  Advised patient that she may be having acute MI. She states that she would not want any procedures including LHC or aggressive measures  Patient appears to understand risk and benefits  Family in room for conversation  Continue ASA  Holding statin with mild transaminitis- can start tomorrow if lfts stable  TTE Monday 1/30/24  Patient awaiting results of TTE  Ready for discharge  awaiting results  Probable D/C tomorrow    Transaminitis  Mild  Possibly congestive hepatopathy  Monitor daily  Resolved      Acute respiratory failure with hypoxia  Patient with Hypoxic Respiratory failure which is Acute.  she is not on home oxygen. Supplemental oxygen was provided and noted- Oxygen Concentration (%):  [28] 28    .   Signs/symptoms of respiratory failure include- increased work of breathing. Contributing diagnoses includes - CHF Labs and images were reviewed. Patient Has not had a recent ABG. Will treat underlying causes and adjust management of respiratory failure as follows-     Treat per CHF pathway  Weaned to RA currently    Advanced care planning/counseling discussion  Had GOC conversation with patient and her family. Patient is DNR/DNI  She does not want any procedures or aggressive measures      Heart failure with reduced ejection fraction  Acute on chronic HFrEF exacerbation  On CHF pathway  Given IV diuresis in ED. Good UOP and now on RA. Hold further doses for now  PO lasix  Keep K>4, Mg>2  Tele  Strict I's and O's  Daily weight  TTE ordered      Hypothyroidism  F/U TSH  Continue synthroid      Hypertension  Chronic  Controlled  Continue home medications as tolerated and titrate as needed  PRN Hydral if BP >180/110 and patient with significant symptoms of chest pain, nausea, AMS, SOB      VTE Risk Mitigation (From admission, onward)           Ordered     enoxaparin injection 30 mg  Daily         01/27/24 0956     IP VTE HIGH RISK PATIENT  Once         01/27/24 0956     Place sequential compression device  Until discontinued         01/27/24 0956                    Discharge Planning   ART: 1/31/2024     Code Status: DNR   Is the patient medically ready for discharge?:     Reason for patient still in hospital (select all that apply): Imaging  Discharge Plan A: Skilled Nursing Facility   Discharge Delays: None known at this time              Yg Talavera MD  Department of Hospital  [facility]

## 2024-01-31 NOTE — PLAN OF CARE
Per Alpa Lion, report can be called to [facility] [phone]. Transportation will be here around 3:30 PM       01/31/24 1425   Post-Acute Status   Post-Acute Authorization Placement   Post-Acute Placement Status Set-up Complete/Auth obtained

## 2024-02-05 NOTE — PT/OT/SLP PROGRESS
"Occupational Therapy   Treatment    Name: Re Arciniega  MRN: [id number]  Admitting Diagnosis:  NSTEMI (non-ST elevated myocardial infarction)       Recommendations:     Discharge Recommendations:  dc to skilled  Discharge Equipment Recommendations:   none required if dc to skilled   Barriers to discharge:   patient lives w/ 2 sons who work    Assessment:     Re Arciniega is a 89 y.o. female with a medical diagnosis of NSTEMI (non-ST elevated myocardial infarction).  She presents with decreased ind w/ all adls. Performance deficits affecting function are   decreased functional  mobility, decreased strength , decreased functional activity tolerance     Rehab Prognosis:  Good; patient would benefit from acute skilled OT services to address these deficits and reach maximum level of function.       Plan:     Patient to be seen  3-5x's per week to address the above listed problems via  ot intervention  Plan of Care Expires:  upon dc   Plan of Care Reviewed with:  patient/family/nursing/ss    Subjective     Chief Complaint: NSTEMI  Patient/Family Comments/goals: "to get stronger"  Pain/Comfort:   None reported    Objective:     Communicated with: nursing prior to session.  Patient found supine with   upon OT entry to room.    General Precautions: Standard,      Orthopedic Precautions: none  Braces:  none  Respiratory Status: Room air     Occupational Performance:     Bed Mobility:    Patient performed all bed mobility w/ min to cga. Patient performed supine to sit w/ min a       Functional Mobility/Transfers:  Patient completed Sit <> Stand Transfer with contact guard assistance  with  hand-held assist   Patient completed Bed <> Chair Transfer using Stand Pivot technique with minimum assistance with hand-held assist  Patient completed Chair <> Mat Stand Pivot technique with minimum assistance with hand-held assist      Activities of Daily Living:  Feeding:  stand by assistance geraldine  Grooming: stand by assistance geraldine Storm " dressing w/ min a     Treatment & Education:  Patient supine upon therapist's arrival. Patient performed bed mobility for rolling w/ cga. Patient performed lb dressing w/  min a to don socks and slippers. Patient performed supine to sit w/ min a. Patient transferred from bed to chair w/ min a w/ use of hha. Patient performed simple g/h tasks w/ sua.  Patient's lunch brought to room.  Patient performed self feed task w/ sua. Left patient up in chair  Patient        Patient left up in chair with all lines intact and nursing notified    GOALS:   Patient will perform lb dressing ind'ly  Patient will increase her functional activity tolerance to fair+ in order to increase her ind w/ all adls.             Time Tracking:     OT Date of Treatment:  01/30/2024  OT Start Time:  11:35  OT Stop Time:  12:05  OT Total Time (min):  30    Billable Minutes: theract 15  Adl 15               1/30/2024   Egg [place]/yogurt

## 2024-03-01 ENCOUNTER — HOSPITAL ENCOUNTER (INPATIENT)
Facility: HOSPITAL | Age: 89
LOS: 2 days | Discharge: HOME | DRG: 280 | End: 2024-03-03
Attending: EMERGENCY MEDICINE | Admitting: INTERNAL MEDICINE
Payer: MEDICARE

## 2024-03-01 DIAGNOSIS — R06.02 SHORTNESS OF BREATH: ICD-10-CM

## 2024-03-01 DIAGNOSIS — R06.02 SOB (SHORTNESS OF BREATH): ICD-10-CM

## 2024-03-01 DIAGNOSIS — R07.9 CHEST PAIN: ICD-10-CM

## 2024-03-01 LAB
ALBUMIN SERPL BCP-MCNC: 3.9 G/DL (ref 3.5–5.2)
ALP SERPL-CCNC: 69 U/L (ref 55–135)
ALT SERPL W/O P-5'-P-CCNC: 12 U/L (ref 10–44)
ANION GAP SERPL CALC-SCNC: 10 MMOL/L (ref 8–16)
AST SERPL-CCNC: 23 U/L (ref 10–40)
BASOPHILS # BLD AUTO: 0.03 K/UL (ref 0–0.2)
BASOPHILS NFR BLD: 0.5 % (ref 0–1.9)
BILIRUB SERPL-MCNC: 0.6 MG/DL (ref 0.1–1)
BNP SERPL-MCNC: 982 PG/ML (ref 0–99)
BUN SERPL-MCNC: 22 MG/DL (ref 10–30)
CALCIUM SERPL-MCNC: 9.1 MG/DL (ref 8.7–10.5)
CHLORIDE SERPL-SCNC: 105 MMOL/L (ref 95–110)
CO2 SERPL-SCNC: 26 MMOL/L (ref 23–29)
CREAT SERPL-MCNC: 0.9 MG/DL (ref 0.5–1.4)
DIFFERENTIAL METHOD BLD: ABNORMAL
EOSINOPHIL # BLD AUTO: 0.3 K/UL (ref 0–0.5)
EOSINOPHIL NFR BLD: 5.5 % (ref 0–8)
ERYTHROCYTE [DISTWIDTH] IN BLOOD BY AUTOMATED COUNT: 13.5 % (ref 11.5–14.5)
EST. GFR  (NO RACE VARIABLE): 59.6 ML/MIN/1.73 M^2
GLUCOSE SERPL-MCNC: 98 MG/DL (ref 70–110)
HCT VFR BLD AUTO: 33.8 % (ref 37–48.5)
HGB BLD-MCNC: 10.8 G/DL (ref 12–16)
IMM GRANULOCYTES # BLD AUTO: 0.02 K/UL (ref 0–0.04)
IMM GRANULOCYTES NFR BLD AUTO: 0.3 % (ref 0–0.5)
INFLUENZA A, MOLECULAR: NEGATIVE
INFLUENZA B, MOLECULAR: NEGATIVE
LYMPHOCYTES # BLD AUTO: 1.6 K/UL (ref 1–4.8)
LYMPHOCYTES NFR BLD: 28.4 % (ref 18–48)
MAGNESIUM SERPL-MCNC: 1.6 MG/DL (ref 1.6–2.6)
MCH RBC QN AUTO: 31.1 PG (ref 27–31)
MCHC RBC AUTO-ENTMCNC: 32 G/DL (ref 32–36)
MCV RBC AUTO: 97 FL (ref 82–98)
MONOCYTES # BLD AUTO: 0.5 K/UL (ref 0.3–1)
MONOCYTES NFR BLD: 8.7 % (ref 4–15)
NEUTROPHILS # BLD AUTO: 3.3 K/UL (ref 1.8–7.7)
NEUTROPHILS NFR BLD: 56.6 % (ref 38–73)
NRBC BLD-RTO: 0 /100 WBC
OHS QRS DURATION: 130 MS
OHS QTC CALCULATION: 481 MS
PLATELET # BLD AUTO: 154 K/UL (ref 150–450)
PMV BLD AUTO: 10.2 FL (ref 9.2–12.9)
POTASSIUM SERPL-SCNC: 4.5 MMOL/L (ref 3.5–5.1)
PROT SERPL-MCNC: 7.5 G/DL (ref 6–8.4)
RBC # BLD AUTO: 3.47 M/UL (ref 4–5.4)
SARS-COV-2 RDRP RESP QL NAA+PROBE: NEGATIVE
SODIUM SERPL-SCNC: 141 MMOL/L (ref 136–145)
SPECIMEN SOURCE: NORMAL
TROPONIN I SERPL DL<=0.01 NG/ML-MCNC: 0.04 NG/ML (ref 0–0.03)
TROPONIN I SERPL DL<=0.01 NG/ML-MCNC: 0.05 NG/ML (ref 0–0.03)
WBC # BLD AUTO: 5.77 K/UL (ref 3.9–12.7)

## 2024-03-01 PROCEDURE — 94640 AIRWAY INHALATION TREATMENT: CPT

## 2024-03-01 PROCEDURE — 96375 TX/PRO/DX INJ NEW DRUG ADDON: CPT

## 2024-03-01 PROCEDURE — 25000003 PHARM REV CODE 250: Performed by: INTERNAL MEDICINE

## 2024-03-01 PROCEDURE — 93005 ELECTROCARDIOGRAM TRACING: CPT

## 2024-03-01 PROCEDURE — 36415 COLL VENOUS BLD VENIPUNCTURE: CPT | Performed by: HOSPITALIST

## 2024-03-01 PROCEDURE — 84484 ASSAY OF TROPONIN QUANT: CPT | Performed by: EMERGENCY MEDICINE

## 2024-03-01 PROCEDURE — 83880 ASSAY OF NATRIURETIC PEPTIDE: CPT | Performed by: EMERGENCY MEDICINE

## 2024-03-01 PROCEDURE — 25000242 PHARM REV CODE 250 ALT 637 W/ HCPCS

## 2024-03-01 PROCEDURE — 71045 X-RAY EXAM CHEST 1 VIEW: CPT | Mod: TC

## 2024-03-01 PROCEDURE — 21400001 HC TELEMETRY ROOM

## 2024-03-01 PROCEDURE — 80053 COMPREHEN METABOLIC PANEL: CPT | Performed by: EMERGENCY MEDICINE

## 2024-03-01 PROCEDURE — 99285 EMERGENCY DEPT VISIT HI MDM: CPT | Mod: 25

## 2024-03-01 PROCEDURE — 93010 ELECTROCARDIOGRAM REPORT: CPT | Mod: ,,, | Performed by: INTERNAL MEDICINE

## 2024-03-01 PROCEDURE — 97535 SELF CARE MNGMENT TRAINING: CPT

## 2024-03-01 PROCEDURE — 27000221 HC OXYGEN, UP TO 24 HOURS

## 2024-03-01 PROCEDURE — 97166 OT EVAL MOD COMPLEX 45 MIN: CPT

## 2024-03-01 PROCEDURE — 96365 THER/PROPH/DIAG IV INF INIT: CPT

## 2024-03-01 PROCEDURE — 63600175 PHARM REV CODE 636 W HCPCS: Performed by: INTERNAL MEDICINE

## 2024-03-01 PROCEDURE — 63600175 PHARM REV CODE 636 W HCPCS: Performed by: EMERGENCY MEDICINE

## 2024-03-01 PROCEDURE — 84484 ASSAY OF TROPONIN QUANT: CPT | Mod: 91 | Performed by: HOSPITALIST

## 2024-03-01 PROCEDURE — 94761 N-INVAS EAR/PLS OXIMETRY MLT: CPT

## 2024-03-01 PROCEDURE — 83735 ASSAY OF MAGNESIUM: CPT | Performed by: EMERGENCY MEDICINE

## 2024-03-01 PROCEDURE — 25000003 PHARM REV CODE 250: Performed by: HOSPITALIST

## 2024-03-01 PROCEDURE — 99223 1ST HOSP IP/OBS HIGH 75: CPT | Mod: AI,,, | Performed by: INTERNAL MEDICINE

## 2024-03-01 PROCEDURE — U0002 COVID-19 LAB TEST NON-CDC: HCPCS | Performed by: EMERGENCY MEDICINE

## 2024-03-01 PROCEDURE — 25000242 PHARM REV CODE 250 ALT 637 W/ HCPCS: Performed by: EMERGENCY MEDICINE

## 2024-03-01 PROCEDURE — 71045 X-RAY EXAM CHEST 1 VIEW: CPT | Mod: 26,,, | Performed by: RADIOLOGY

## 2024-03-01 PROCEDURE — 85025 COMPLETE CBC W/AUTO DIFF WBC: CPT | Performed by: EMERGENCY MEDICINE

## 2024-03-01 PROCEDURE — 87502 INFLUENZA DNA AMP PROBE: CPT | Performed by: EMERGENCY MEDICINE

## 2024-03-01 RX ORDER — MUPIROCIN 20 MG/G
OINTMENT TOPICAL 2 TIMES DAILY
Status: DISCONTINUED | OUTPATIENT
Start: 2024-03-01 | End: 2024-03-03 | Stop reason: HOSPADM

## 2024-03-01 RX ORDER — DOCUSATE SODIUM 100 MG/1
100 CAPSULE, LIQUID FILLED ORAL 2 TIMES DAILY
Status: DISCONTINUED | OUTPATIENT
Start: 2024-03-01 | End: 2024-03-01

## 2024-03-01 RX ORDER — MAGNESIUM SULFATE HEPTAHYDRATE 40 MG/ML
2 INJECTION, SOLUTION INTRAVENOUS
Status: COMPLETED | OUTPATIENT
Start: 2024-03-01 | End: 2024-03-01

## 2024-03-01 RX ORDER — IPRATROPIUM BROMIDE AND ALBUTEROL SULFATE 2.5; .5 MG/3ML; MG/3ML
SOLUTION RESPIRATORY (INHALATION)
Status: COMPLETED
Start: 2024-03-01 | End: 2024-03-01

## 2024-03-01 RX ORDER — NAPROXEN SODIUM 220 MG/1
81 TABLET, FILM COATED ORAL DAILY
Status: DISCONTINUED | OUTPATIENT
Start: 2024-03-01 | End: 2024-03-03 | Stop reason: HOSPADM

## 2024-03-01 RX ORDER — DOCUSATE SODIUM 100 MG/1
200 CAPSULE, LIQUID FILLED ORAL DAILY
Status: DISCONTINUED | OUTPATIENT
Start: 2024-03-01 | End: 2024-03-03 | Stop reason: HOSPADM

## 2024-03-01 RX ORDER — CARVEDILOL 3.12 MG/1
3.12 TABLET ORAL 2 TIMES DAILY
Status: DISCONTINUED | OUTPATIENT
Start: 2024-03-01 | End: 2024-03-03 | Stop reason: HOSPADM

## 2024-03-01 RX ORDER — GLUCAGON 1 MG
1 KIT INJECTION
Status: DISCONTINUED | OUTPATIENT
Start: 2024-03-01 | End: 2024-03-03 | Stop reason: HOSPADM

## 2024-03-01 RX ORDER — FUROSEMIDE 10 MG/ML
40 INJECTION INTRAMUSCULAR; INTRAVENOUS 2 TIMES DAILY
Status: DISCONTINUED | OUTPATIENT
Start: 2024-03-01 | End: 2024-03-03 | Stop reason: HOSPADM

## 2024-03-01 RX ORDER — LEVOTHYROXINE SODIUM 88 UG/1
88 TABLET ORAL
Status: DISCONTINUED | OUTPATIENT
Start: 2024-03-01 | End: 2024-03-01 | Stop reason: SDUPTHER

## 2024-03-01 RX ORDER — LEVOTHYROXINE SODIUM 88 UG/1
88 TABLET ORAL
Status: DISCONTINUED | OUTPATIENT
Start: 2024-03-02 | End: 2024-03-03 | Stop reason: HOSPADM

## 2024-03-01 RX ORDER — ONDANSETRON HYDROCHLORIDE 2 MG/ML
4 INJECTION, SOLUTION INTRAVENOUS EVERY 8 HOURS PRN
Status: DISCONTINUED | OUTPATIENT
Start: 2024-03-01 | End: 2024-03-03 | Stop reason: HOSPADM

## 2024-03-01 RX ORDER — POLYETHYLENE GLYCOL 3350 17 G/17G
17 POWDER, FOR SOLUTION ORAL DAILY
Status: DISCONTINUED | OUTPATIENT
Start: 2024-03-01 | End: 2024-03-03 | Stop reason: HOSPADM

## 2024-03-01 RX ORDER — ENOXAPARIN SODIUM 100 MG/ML
40 INJECTION SUBCUTANEOUS EVERY 24 HOURS
Status: DISCONTINUED | OUTPATIENT
Start: 2024-03-01 | End: 2024-03-01 | Stop reason: SDUPTHER

## 2024-03-01 RX ORDER — NAPROXEN SODIUM 220 MG/1
81 TABLET, FILM COATED ORAL DAILY
Status: DISCONTINUED | OUTPATIENT
Start: 2024-03-01 | End: 2024-03-01 | Stop reason: SDUPTHER

## 2024-03-01 RX ORDER — SODIUM CHLORIDE 0.9 % (FLUSH) 0.9 %
10 SYRINGE (ML) INJECTION
Status: DISCONTINUED | OUTPATIENT
Start: 2024-03-01 | End: 2024-03-03 | Stop reason: HOSPADM

## 2024-03-01 RX ORDER — NAPROXEN SODIUM 220 MG/1
324 TABLET, FILM COATED ORAL
Status: DISCONTINUED | OUTPATIENT
Start: 2024-03-01 | End: 2024-03-01

## 2024-03-01 RX ORDER — ALUMINUM HYDROXIDE, MAGNESIUM HYDROXIDE, AND SIMETHICONE 1200; 120; 1200 MG/30ML; MG/30ML; MG/30ML
30 SUSPENSION ORAL 4 TIMES DAILY PRN
Status: DISCONTINUED | OUTPATIENT
Start: 2024-03-01 | End: 2024-03-03 | Stop reason: HOSPADM

## 2024-03-01 RX ORDER — POTASSIUM CHLORIDE 750 MG/1
10 TABLET, EXTENDED RELEASE ORAL DAILY
Status: DISCONTINUED | OUTPATIENT
Start: 2024-03-01 | End: 2024-03-03 | Stop reason: HOSPADM

## 2024-03-01 RX ORDER — IPRATROPIUM BROMIDE AND ALBUTEROL SULFATE 2.5; .5 MG/3ML; MG/3ML
3 SOLUTION RESPIRATORY (INHALATION)
Status: COMPLETED | OUTPATIENT
Start: 2024-03-01 | End: 2024-03-01

## 2024-03-01 RX ORDER — FUROSEMIDE 10 MG/ML
40 INJECTION INTRAMUSCULAR; INTRAVENOUS
Status: COMPLETED | OUTPATIENT
Start: 2024-03-01 | End: 2024-03-01

## 2024-03-01 RX ORDER — PROCHLORPERAZINE EDISYLATE 5 MG/ML
5 INJECTION INTRAMUSCULAR; INTRAVENOUS EVERY 6 HOURS PRN
Status: DISCONTINUED | OUTPATIENT
Start: 2024-03-01 | End: 2024-03-03 | Stop reason: HOSPADM

## 2024-03-01 RX ORDER — FERROUS GLUCONATE 324(38)MG
TABLET ORAL DAILY
Status: DISCONTINUED | OUTPATIENT
Start: 2024-03-01 | End: 2024-03-03 | Stop reason: HOSPADM

## 2024-03-01 RX ORDER — ACETAMINOPHEN 325 MG/1
650 TABLET ORAL EVERY 6 HOURS PRN
Status: DISCONTINUED | OUTPATIENT
Start: 2024-03-01 | End: 2024-03-03 | Stop reason: HOSPADM

## 2024-03-01 RX ORDER — ENOXAPARIN SODIUM 100 MG/ML
30 INJECTION SUBCUTANEOUS EVERY 24 HOURS
Status: DISCONTINUED | OUTPATIENT
Start: 2024-03-01 | End: 2024-03-03 | Stop reason: HOSPADM

## 2024-03-01 RX ORDER — LOSARTAN POTASSIUM 25 MG/1
100 TABLET ORAL DAILY
Status: DISCONTINUED | OUTPATIENT
Start: 2024-03-01 | End: 2024-03-03 | Stop reason: HOSPADM

## 2024-03-01 RX ADMIN — LOSARTAN POTASSIUM 100 MG: 25 TABLET, FILM COATED ORAL at 09:03

## 2024-03-01 RX ADMIN — DOCUSATE SODIUM 200 MG: 100 CAPSULE ORAL at 03:03

## 2024-03-01 RX ADMIN — MAGNESIUM SULFATE HEPTAHYDRATE 2 G: 40 INJECTION, SOLUTION INTRAVENOUS at 05:03

## 2024-03-01 RX ADMIN — ENOXAPARIN SODIUM 30 MG: 30 INJECTION SUBCUTANEOUS at 04:03

## 2024-03-01 RX ADMIN — MUPIROCIN: 20 OINTMENT TOPICAL at 09:03

## 2024-03-01 RX ADMIN — FERROUS GLUCONATE 324 MG: 324 TABLET ORAL at 05:03

## 2024-03-01 RX ADMIN — FUROSEMIDE 40 MG: 10 INJECTION, SOLUTION INTRAVENOUS at 03:03

## 2024-03-01 RX ADMIN — ASPIRIN 81 MG: 81 TABLET, CHEWABLE ORAL at 09:03

## 2024-03-01 RX ADMIN — CARVEDILOL 3.12 MG: 3.12 TABLET, FILM COATED ORAL at 09:03

## 2024-03-01 RX ADMIN — LEVOTHYROXINE SODIUM 88 MCG: 88 TABLET ORAL at 07:03

## 2024-03-01 RX ADMIN — IPRATROPIUM BROMIDE AND ALBUTEROL SULFATE 3 ML: .5; 2.5 SOLUTION RESPIRATORY (INHALATION) at 04:03

## 2024-03-01 RX ADMIN — IPRATROPIUM BROMIDE AND ALBUTEROL SULFATE 3 ML: .5; 2.5 SOLUTION RESPIRATORY (INHALATION) at 05:03

## 2024-03-01 RX ADMIN — FUROSEMIDE 40 MG: 10 INJECTION, SOLUTION INTRAMUSCULAR; INTRAVENOUS at 04:03

## 2024-03-01 RX ADMIN — POTASSIUM CHLORIDE 10 MEQ: 750 TABLET, EXTENDED RELEASE ORAL at 04:03

## 2024-03-01 NOTE — CONSULTS
"  [facility] Intensive Care  Adult Nutrition  Consult Note    SUMMARY     Recommendations    Recommendation/Intervention: 1. 2300 mg Na diet and fl restriction per MD. 2. Provide pt w/ nutrition education materials prior to discharge.  Goals: 1. Nutrition/lifestyle modifications  Nutrition Goal Status: new    Assessment and Plan    Nutrition Problem  Lack or limited nutrition knowledge    Related to (etiology):   CHF     Signs and Symptoms (as evidenced by):   Continued fluid retention.      Interventions/Recommendations (treatment strategy):  See above recommendations.    Nutrition Diagnosis Status:   New         Malnutrition Assessment  Malnutrition Level:  (Lacks sufficient evidence per ASPEN malnutrition criteria.)                                    Reason for Assessment    Reason For Assessment: consult (Na and fl restriction edu)  Diagnosis: cardiac disease  Relevant Medical History: CHF, HTN  Interdisciplinary Rounds: did not attend    Nutrition Risk Screen    Nutrition Risk Screen: no indicators present    Anthropometrics    Temp: 98.1 °F (36.7 °C)  Height Method: Stated  Height: 5' 6" (167.6 cm)  Height (inches): 66 in  Weight Method: Bed Scale  Weight: 56.7 kg (125 lb)  Weight (lb): 125 lb  Ideal Body Weight (IBW), Female: 130 lb  % Ideal Body Weight, Female (lb): 96.15 %  BMI (Calculated): 20.2  BMI Grade: 18.5-24.9 - normal     Wt Readings from Last 30 Encounters:   03/01/24 56.7 kg (125 lb)   01/31/24 57.2 kg (126 lb 1.7 oz)   01/11/24 54.8 kg (120 lb 14.4 oz)   11/10/23 54.8 kg (120 lb 12.8 oz)   09/07/23 54.2 kg (119 lb 6.4 oz)   07/07/23 51.3 kg (113 lb)   07/06/23 51.3 kg (113 lb)   06/08/23 51.5 kg (113 lb 9.6 oz)   05/12/23 52.6 kg (116 lb)   03/10/23 52.6 kg (116 lb)   02/15/23 52.6 kg (116 lb)   01/12/23 53.1 kg (117 lb)   12/21/22 53.1 kg (117 lb)   10/27/22 54.1 kg (119 lb 3.2 oz)   07/28/22 54.9 kg (121 lb)   04/12/22 54.9 kg (121 lb)   01/11/22 54.9 kg (121 lb)   10/05/21 56.7 kg (125 lb) "   10/02/21 56.7 kg (125 lb)   09/02/21 54.9 kg (121 lb)   08/31/21 54.9 kg (121 lb)   08/03/21 59.9 kg (132 lb)   03/01/19 59.9 kg (132 lb)   ]    Lab/Procedures/Meds    Pertinent Labs Reviewed: reviewed  Pertinent Labs Comments: GFR 60  Pertinent Medications Reviewed: reviewed  Pertinent Medications Comments: furosemide injection 40 mg, magnesium sulfate 2g in water 50mL IVPB (premix)     aspirin  81 mg Oral Daily    aspirin  81 mg Oral Daily    carvediloL  3.125 mg Oral BID    docusate sodium  100 mg Oral BID    enoxparin  30 mg Subcutaneous Daily    enoxparin  40 mg Subcutaneous Daily    ferrous sulfate  1 tablet Oral Daily    furosemide (LASIX) injection  40 mg Intravenous BID    levothyroxine  88 mcg Oral Before breakfast    [START ON 3/2/2024] levothyroxine  88 mcg Oral Before breakfast    losartan  100 mg Oral Daily    mupirocin   Nasal BID    polyethylene glycol  17 g Oral Daily    potassium chloride SA  10 mEq Oral Daily     Estimated/Assessed Needs    Weight Used For Calorie Calculations: 56.7 kg (125 lb)  Energy Calorie Requirements (kcal): 3357-8788 kcal/day  Energy Need Method: Kcal/kg (25-30 kcal/kg)  Protein Requirements: 63 gm/day  Weight Used For Protein Calculations: 56.7 kg (125 lb) (1.1 gm/kg)  Fluid Requirements (mL): 9679-0639 mL/day  Estimated Fluid Requirement Method: RDA Method  RDA Method (mL): 1417  CHO Requirement: 200 gm/day      Nutrition Prescription Ordered    Current Diet Order: Diet Low Sodium, 2gm Fluid - 1500mL    Evaluation of Received Nutrient/Fluid Intake       % Intake of Estimated Energy Needs: Other: N/A  % Meal Intake: Other: N/A    Nutrition Risk    Level of Risk/Frequency of Follow-up: moderate       Monitor and Evaluation    Food and Nutrient Intake: food and beverage intake  Food and Nutrient Adminstration: diet order  Knowledge/Beliefs/Attitudes: food and nutrition knowledge/skill, beliefs and attitudes  Anthropometric Measurements: weight change  Biochemical Data,  Medical Tests and Procedures: electrolyte and renal panel     Jonas Duvall RDN

## 2024-03-01 NOTE — ED PROVIDER NOTES
"Encounter Date: 3/1/2024       History     Chief Complaint   Patient presents with    Shortness of Breath     Pt. C/o SOB. States she has "been off of fluid pills for a while.".      89-year-old female from home for shortness of breath.  Patient has an appointment with her physician today.  Apparently got up to go to the bathroom and felt short of breath so called the ambulance.  Denies any chest pain no fever.  States she has had a cough for several weeks.    The history is provided by the patient, medical records and the EMS personnel. No  was used.     Review of patient's allergies indicates:  No Known Allergies  Past Medical History:   Diagnosis Date    Anemia     CHF (congestive heart failure)     Constipation     Hypertension     Thyroid disease      Past Surgical History:   Procedure Laterality Date    CHOLECYSTECTOMY      HYSTERECTOMY       History reviewed. No pertinent family history.  Social History     Tobacco Use    Smoking status: Never    Smokeless tobacco: Never   Substance Use Topics    Alcohol use: No    Drug use: No     Review of Systems   Constitutional:  Negative for fever.   HENT:  Negative for sore throat.    Respiratory:  Positive for cough and shortness of breath.    Cardiovascular:  Negative for chest pain.   Gastrointestinal:  Negative for nausea.   Genitourinary:  Negative for dysuria.   Musculoskeletal:  Negative for back pain.   Skin:  Negative for rash.   Neurological:  Negative for weakness.   Hematological:  Does not bruise/bleed easily.   All other systems reviewed and are negative.      Physical Exam     Initial Vitals [03/01/24 0417]   BP Pulse Resp Temp SpO2   (!) 181/107 90 (!) 22 98.2 °F (36.8 °C) (!) 94 %      MAP       --         Physical Exam    Nursing note and vitals reviewed.  Constitutional: She appears well-nourished. She is not diaphoretic.   HENT:   Head: Normocephalic and atraumatic.   Eyes: EOM are normal. Pupils are equal, round, and reactive to " light.   Neck:   Normal range of motion.  Cardiovascular:  Normal rate and regular rhythm.           Pulmonary/Chest: She is in respiratory distress. She has wheezes.   Abdominal: Abdomen is soft.   Musculoskeletal:      Cervical back: Normal range of motion.      Comments: Bilateral lower extremity edema up to the knees bilaterally.     Neurological: She is alert and oriented to person, place, and time.   Skin: Skin is warm.         ED Course   Procedures  Labs Reviewed   CBC W/ AUTO DIFFERENTIAL - Abnormal; Notable for the following components:       Result Value    RBC 3.47 (*)     Hemoglobin 10.8 (*)     Hematocrit 33.8 (*)     MCH 31.1 (*)     All other components within normal limits   B-TYPE NATRIURETIC PEPTIDE - Abnormal; Notable for the following components:     (*)     All other components within normal limits   COMPREHENSIVE METABOLIC PANEL - Abnormal; Notable for the following components:    eGFR 59.6 (*)     All other components within normal limits   TROPONIN I - Abnormal; Notable for the following components:    Troponin I 0.043 (*)     All other components within normal limits   INFLUENZA A & B BY MOLECULAR   MAGNESIUM   SARS-COV-2 RNA AMPLIFICATION, QUAL    Narrative:     Is the patient symptomatic?->Yes     EKG Readings: (Independently Interpreted)   Initial Reading: No STEMI.   EKG done at 4:38 a.m. shows a rate of 90, AZ interval 146, QRS duration 130, .  My interpretation of the EKGs that this is a normal sinus rhythm nonspecific ST T-wave changes with no reciprocal changes.         Imaging Results              X-Ray Chest AP Portable (In process)                      Medications   magnesium sulfate 2g in water 50mL IVPB (premix) (2 g Intravenous New Bag 3/1/24 4705)   furosemide injection 40 mg (40 mg Intravenous Given 3/1/24 9778)   albuterol-ipratropium 2.5 mg-0.5 mg/3 mL nebulizer solution 3 mL (3 mLs Nebulization Given 3/1/24 9944)   albuterol-ipratropium (DUO-NEB) 2.5 mg-0.5  mg/3 mL nebulizer solution (3 mLs  Given 3/1/24 0528)     Medical Decision Making  Differential diagnosis for this patient includes heart failure, ACS, COVID, pneumonia    Per recent discharge summary patient does not want any invasive procedures or measures taken and patient is DNR.    Labs consistent with CHF exacerbation.  Patient given IV Lasix.  Also given breathing treatment and steroids for possible COPD component.  Per previous discharge instructions she is supposed to be on p.o. Lasix.  However patient states she does not take it nor is an in her med rec.  It is possible this fell through the cracks during her previous discharge where she just stopped taking it for some reason.  Plan will be admission for diuresis.    Amount and/or Complexity of Data Reviewed  Labs: ordered.  Radiology: ordered.    Risk  Prescription drug management.  Decision regarding hospitalization.                                      Clinical Impression:  Final diagnoses:  [R06.02] SOB (shortness of breath)                 Shilpa Warren MD  03/11/24 2025

## 2024-03-01 NOTE — PT/OT/SLP EVAL
"Occupational Therapy   Evaluation    Name: Re Arciniega  MRN: [id number]  Admitting Diagnosis: Acute respiratory failure with hypoxia  Recent Surgery: * No surgery found *      Recommendations:     Discharge Recommendations:  home with home health  Discharge Equipment Recommendations:   none; patient has all needed equipment  Barriers to discharge:   none    Assessment:     Re Arciniega is a 89 y.o. female with a medical diagnosis of Acute respiratory failure with hypoxia.  She presents with decreased engagement in ADL/IADL tasks. Performance deficits affecting function: decreased activity tolerance, decreased muscular strength .      Rehab Prognosis: Good; patient would benefit from acute skilled OT services to address these deficits and reach maximum level of function.       Plan:     Patient to be seen 3-5X/week  to address the above listed problems via  OT intervention  Plan of Care Expires:  upon discharge  Plan of Care Reviewed with:  patient, patient's daughter, and     Subjective     Chief Complaint: shortness of breath  Patient/Family Comments/goals: "I want to feel better and not so weak."    Occupational Profile:  Living Environment: patient lives in a one-story home with her son who is disabled. Her daughter provides assistance as needed.  Previous level of function: patient required moderate assist in IADL tasks.  Roles and Routines: patient enjoys sleeping.  Equipment Used at Home:  rolling walker  Assistance upon Discharge: patient's son and daughter    Pain/Comfort:   0/10 pain    Patients cultural, spiritual, Yarsanism conflicts given the current situation:  none    Objective:     Communicated with: RN prior to session.  Patient found supine with all lines intact  upon OT entry to room.    General Precautions: Standard,    Orthopedic Precautions:  None  Braces:  None  Respiratory Status: Room air    Occupational Performance:    Bed Mobility:    Patient completed Rolling/Turning to Left " with  independence  Patient completed Rolling/Turning to Right with independence  Patient completed Scooting/Bridging with minimum assistance  Patient completed Supine to Sit with contact guard assistance  Patient completed Sit to Supine with contact guard assistance    Functional Mobility/Transfers:  Patient completed Sit <> Stand Transfer with contact guard assistance  with  hand-held assist     Activities of Daily Living:  Feeding:  modified independence set up  Grooming: modified independence set up  Bathing: modified independence patient gives herself sponge baths  Upper Body Dressing: modified independence extra time  Lower Body Dressing: modified independence extra time  Toileting: contact guard assistance during transfer    Cognitive/Visual Perceptual:  Cognitive/Psychosocial Skills:     -       Oriented to: Person, Place, Time, and Situation   -       Follows Commands/attention:Follows multistep  commands  -       Communication: clear/fluent  -       Memory: No Deficits noted  -       Safety awareness/insight to disability: intact   -       Mood/Affect/Coping skills/emotional control: Appropriate to situation  Visual/Perceptual:      -Intact visual fields, pursuits, saccades    Physical Exam:  Balance:    -       fair+ static seated balance and fair- dynamic seated balance  Dominant hand:    -       Right  Upper Extremity Range of Motion:     -       Right Upper Extremity: WFL  -       Left Upper Extremity: WFL  Upper Extremity Strength:    -       Right Upper Extremity: 3+/5 MMT  -       Left Upper Extremity: 3+/5 MMT   Strength:    -       Right Upper Extremity: 4/5 MMT  -       Left Upper Extremity: 4/5 MMT    Treatment & Education:  Patient reported feeling short of breath when engaging in ADL/IADL tasks. OT provided education on diaphragmatic breathing with good return of education. Throughout session, patient's O2 saturation was monitored. Patient's O2 saturation dropped to as low as 86% with  increased functional activity. Patient able to return to safe O2 saturation using diaphragmatic breathing. Patient required extra time to don/doff sock while seated at EOB. Patient performed oral hygiene with set up while seated at EOB.     Patient left supine with all lines intact, call button in reach, RN notified, and patient's daughter present    GOALS:   Patient will demonstrate improved activity tolerance as evidenced by standing in performance of grooming/oral hygiene.  Patient will perform toileting transfer with CGA.  Patient will recall 2 strategies to conserve energy during performance of ADL tasks.    History:     Past Medical History:   Diagnosis Date    Anemia     CHF (congestive heart failure)     Constipation     Hypertension     Thyroid disease          Past Surgical History:   Procedure Laterality Date    CHOLECYSTECTOMY      HYSTERECTOMY         Time Tracking:     OT Date of Treatment:  3/1/24  OT Start Time:  2:51 PM  OT Stop Time:  3:16 PM  OT Total Time (min):  25    Billable Minutes:Evaluation 15  Self Care/Home Management 10    VIDHI Mclaughlin, OTR/L  3/1/2024

## 2024-03-01 NOTE — NURSING
Received from ER via stretcher.  Awake and oriented.  Respirations even and unlabored.  No complaints at this time.  External catheter is in place.  Pads clean and dry.  Family at bedside.  Plan of care discussed.  Call light provided.  See flowsheet for further assessment.

## 2024-03-01 NOTE — PLAN OF CARE
[facility] - Intensive Care  Initial Discharge Assessment       Primary Care Provider: Anthony Emanuel MD    Admission Diagnosis: SOB (shortness of breath) [R06.02]    Admission Date: 3/1/2024  Expected Discharge Date: 3/4/2024    Transition of Care Barriers: None    Patient alert & oriented lives at home with her 2 sons which one of is blind & she along with her other son assists in his care. Her daughter Nahomy Dash is also at bedside who assists with grocery shopping & helping take care of her mom & brother. They have other siblings too that assist as needed. She went to rehab after her last admission but doesn't feel like she will need rehab or home health once discharged. Dr Emanuel is her PCP & has an appointment on March 14th at 1:00pm to see him. She was supposed to see Dr Bradley today but was in the hospital. Left his nurse a message to reschedule her appointment. She uses a rolling walker & rollator at home. Denies any needs at this time.            Payor: MEDICARE / Plan: MEDICARE PART A & B / Product Type: Government /     Extended Emergency Contact Information  Primary Emergency Contact: Maria M Magana  Mobile Phone: [phone]  Relation: Daughter  Preferred language: English   needed? No  Secondary Emergency Contact: rafi crespo  Mobile Phone: [phone]  Relation: Daughter    Discharge Plan A: Home with family  Discharge Plan B: Home with family      [address] [address]  Phone: [phone] Fax: [phone]    [address] [address]  Phone: [phone] Fax: [phone]      Initial Assessment (most recent)       Adult Discharge Assessment - 03/01/24 1450          Discharge Assessment    Assessment Type Discharge Planning Assessment     Confirmed/corrected address, phone number and insurance Yes     Confirmed Demographics Correct on Facesheet     Source of Information  patient;family     When was your last doctors appointment? 02/28/24     Communicated ART with patient/caregiver Yes     Reason For Admission shortness of breath     People in Home child(mehdi), adult     Do you expect to return to your current living situation? Yes     Do you have help at home or someone to help you manage your care at home? Yes     Who are your caregiver(s) and their phone number(s)? her son James & daughters Maria M Corea & Stefany     Prior to hospitilization cognitive status: Alert/Oriented     Current cognitive status: Alert/Oriented     Walking or Climbing Stairs Difficulty yes     Walking or Climbing Stairs ambulation difficulty, requires equipment     Mobility Management uses a rollator & rolling walker     Dressing/Bathing Difficulty no     Do you have any problems with: Errands/Grocery   her daughter Nahomy Dash does    Home Accessibility stairs to enter home     Number of Stairs, Main Entrance three     Home Layout Able to live on 1st floor     Equipment Currently Used at Home rollator;walker, rolling     Readmission within 30 days? Yes     Patient currently being followed by outpatient case management? No     Do you currently have service(s) that help you manage your care at home? No     Do you take prescription medications? Yes     Do you have prescription coverage? Yes     Coverage Medicare     Do you have any problems affording any of your prescribed medications? No     Is the patient taking medications as prescribed? yes     Who is going to help you get home at discharge? Nahomy Dash daughter [phone]     How do you get to doctors appointments? family or friend will provide     Are you on dialysis? No     Do you take coumadin? No     Discharge Plan A Home with family     Discharge Plan B Home with family     DME Needed Upon Discharge  none     Discharge Plan discussed with: Patient;Adult children     Transition of Care Barriers None        OTHER    Name(s) of People in Home   & James Arciniega

## 2024-03-01 NOTE — HPI
Transthoracic echocardiogram was completed on 01/30/2024 results are as follows:    Left Ventricle: The left ventricle is normal in size. Mildly increased wall thickness. Severe global hypokinesis present. There is severely reduced systolic function with a visually estimated ejection fraction of 20 - 25%. Ejection fraction by visual approximation is 20%. Global longitudinal strain is -7%. Reduced. There is diastolic dysfunction.  Right Ventricle: Normal right ventricular cavity size. Systolic function is severely reduced. TAPSE is 1.80 cm.  Left Atrium: Left atrium is moderately dilated.  Aortic Valve: The aortic valve is a trileaflet valve. Severely restricted motion. There is severe stenosis. Aortic valve area by VTI is 0.92 cm². Aortic valve peak velocity is 1.96 m/s. Mean gradient is 9 mmHg. The dimensionless index is 0.37. There is mild aortic regurgitation.  Mitral Valve: There is moderate regurgitation.  Tricuspid Valve: There is mild regurgitation.  Pulmonic Valve: There is mild regurgitation.  IVC/SVC: Normal venous pressure at 3 mmHg.  Pericardium: There is a small effusion. No indication of cardiac tamponade.  Significant deterioation of LV function from LVEF of 45% in Echo of 7/2023. There is also progression of LAE.     Patient is a 89-year-old female that has a past medical history of congestive heart failure hypertension hypothyroidism anemia and constipation patient presented last evening with a history of shortness of breath that has worsened over the past 2 days prior to presentation.  Patient states that she seemed to become worse over the last 1 day but it started approximately 2 days ago.  Patient was recently hospitalized for similar case with increased troponin on that visit.  Echocardiogram results are listed above.  Patient does have history of congestive heart failure and states that she was not on her Lasix at home.  Patient was discharged on the previous admission by me to [facility]   Patient now lives with her son at home.  Patient has difficulty with taking care of herself at home.  However she does have help from her son and daughter.  Patient states that she had 2 pillow orthopnea and states that she could not lay flat without becoming more shortness short of breath.

## 2024-03-01 NOTE — ASSESSMENT & PLAN NOTE
Patient with Hypoxic Respiratory failure which is Acute on chronic.  she is not on home oxygen. Supplemental oxygen was provided and noted-      .   Signs/symptoms of respiratory failure include- increased work of breathing. Contributing diagnoses includes - CHF Labs and images were reviewed. Patient Has not had a recent ABG. Will treat underlying causes and adjust management of respiratory failure as follows-     Admit for CHF  On CHF pathway  Continue diuresis with furosemide 40 mg IV BID  Given HFrEF, with hold BB therapy

## 2024-03-01 NOTE — H&P
"[facility] Medicine  History & Physical    Patient Name: Re Arciniega  MRN: [id number]  Patient Class: IP- Inpatient  Admission Date: 3/1/2024  Attending Physician: Yg Talavera MD  Primary Care Provider: Anthony Emanuel MD         Patient information was obtained from patient and ER records.     Subjective:     Principal Problem:Acute respiratory failure with hypoxia    Chief Complaint:   Chief Complaint   Patient presents with    Shortness of Breath     Pt. C/o SOB. States she has "been off of fluid pills for a while.".         HPI: Transthoracic echocardiogram was completed on 01/30/2024 results are as follows:    Left Ventricle: The left ventricle is normal in size. Mildly increased wall thickness. Severe global hypokinesis present. There is severely reduced systolic function with a visually estimated ejection fraction of 20 - 25%. Ejection fraction by visual approximation is 20%. Global longitudinal strain is -7%. Reduced. There is diastolic dysfunction.  Right Ventricle: Normal right ventricular cavity size. Systolic function is severely reduced. TAPSE is 1.80 cm.  Left Atrium: Left atrium is moderately dilated.  Aortic Valve: The aortic valve is a trileaflet valve. Severely restricted motion. There is severe stenosis. Aortic valve area by VTI is 0.92 cm². Aortic valve peak velocity is 1.96 m/s. Mean gradient is 9 mmHg. The dimensionless index is 0.37. There is mild aortic regurgitation.  Mitral Valve: There is moderate regurgitation.  Tricuspid Valve: There is mild regurgitation.  Pulmonic Valve: There is mild regurgitation.  IVC/SVC: Normal venous pressure at 3 mmHg.  Pericardium: There is a small effusion. No indication of cardiac tamponade.  Significant deterioation of LV function from LVEF of 45% in Echo of 7/2023. There is also progression of LAE.     Patient is a 89-year-old female that has a past medical history of congestive heart failure hypertension hypothyroidism " anemia and constipation patient presented last evening with a history of shortness of breath that has worsened over the past 2 days prior to presentation.  Patient states that she seemed to become worse over the last 1 day but it started approximately 2 days ago.  Patient was recently hospitalized for similar case with increased troponin on that visit.  Echocardiogram results are listed above.  Patient does have history of congestive heart failure and states that she was not on her Lasix at home.  Patient was discharged on the previous admission by me to [facility].  Patient now lives with her son at home.  Patient has difficulty with taking care of herself at home.  However she does have help from her son and daughter.  Patient states that she had 2 pillow orthopnea and states that she could not lay flat without becoming more shortness short of breath.    Past Medical History:   Diagnosis Date    Anemia     CHF (congestive heart failure)     Constipation     Hypertension     Thyroid disease        Past Surgical History:   Procedure Laterality Date    CHOLECYSTECTOMY      HYSTERECTOMY         Review of patient's allergies indicates:  No Known Allergies    No current facility-administered medications on file prior to encounter.     Current Outpatient Medications on File Prior to Encounter   Medication Sig    aspirin 81 MG Chew Take 1 tablet (81 mg total) by mouth once daily.    carvediloL (COREG) 3.125 MG tablet Take 1 tablet (3.125 mg total) by mouth 2 (two) times daily.    clobetasol 0.05% (TEMOVATE) 0.05 % Oint Apply topically 2 (two) times daily. apply to heels    cyanocobalamin 1,000 mcg/mL injection Inject 1 mL (1,000 mcg total) into the muscle every 30 days.    docusate sodium (COLACE) 50 MG capsule Take by mouth 2 (two) times daily.    ferrous sulfate (FEOSOL) 325 mg (65 mg iron) Tab tablet Take 1 tablet (325 mg total) by mouth once daily.    hydroCHLOROthiazide (HYDRODIURIL) 25 MG tablet Take 1 tablet  (25 mg total) by mouth once daily.    iron-vitamin C 100-250 mg, ICAR-C, 100-250 mg Tab Take 1 tablet by mouth once daily.    levothyroxine (SYNTHROID) 88 MCG tablet Take 1 tablet (88 mcg total) by mouth before breakfast.    losartan (COZAAR) 100 MG tablet Take 1 tablet (100 mg total) by mouth once daily.    polyethylene glycol (GLYCOLAX) 17 gram PwPk Take 17 g by mouth once daily.    potassium chloride SA (K-DUR,KLOR-CON M) 10 MEQ tablet Take 1 tablet (10 mEq total) by mouth once daily.     Family History    None       Tobacco Use    Smoking status: Never    Smokeless tobacco: Never   Substance and Sexual Activity    Alcohol use: No    Drug use: No    Sexual activity: Never     Review of Systems   Constitutional:  Negative for activity change, appetite change, fatigue and fever.   HENT:  Negative for congestion, ear discharge, mouth sores, nosebleeds, rhinorrhea, sinus pressure, sinus pain and tinnitus.    Eyes: Negative.  Negative for pain, redness and itching.   Respiratory:  Positive for cough, chest tightness, shortness of breath and wheezing. Negative for apnea, choking and stridor.    Cardiovascular:  Positive for leg swelling. Negative for chest pain and palpitations.   Gastrointestinal:  Negative for abdominal distention, abdominal pain, anal bleeding, blood in stool, constipation and diarrhea.   Endocrine: Negative.    Genitourinary:  Negative for difficulty urinating, flank pain, frequency and urgency.   Musculoskeletal:  Positive for arthralgias and myalgias. Negative for back pain and gait problem.   Skin:  Negative for color change and pallor.   Allergic/Immunologic: Negative.    Neurological:  Positive for weakness. Negative for dizziness, facial asymmetry, light-headedness and headaches.   Hematological:  Negative for adenopathy. Does not bruise/bleed easily.     Objective:     Vital Signs (Most Recent):  Temp: 98.1 °F (36.7 °C) (03/01/24 1200)  Pulse: 66 (03/01/24 1200)  Resp: 16 (03/01/24  1200)  BP: 137/82 (03/01/24 1200)  SpO2: 97 % (03/01/24 1200) Vital Signs (24h Range):  Temp:  [97.7 °F (36.5 °C)-98.2 °F (36.8 °C)] 98.1 °F (36.7 °C)  Pulse:  [66-92] 66  Resp:  [12-32] 16  SpO2:  [94 %-100 %] 97 %  BP: (129-181)/() 137/82     Weight: 56.7 kg (125 lb)  Body mass index is 20.18 kg/m².     Physical Exam  Vitals and nursing note reviewed.   Constitutional:       Appearance: Normal appearance. She is well-developed.   HENT:      Head: Normocephalic and atraumatic.      Nose: Nose normal.   Eyes:      Extraocular Movements: Extraocular movements intact.      Pupils: Pupils are equal, round, and reactive to light.   Neck:      Thyroid: No thyromegaly.      Trachea: No tracheal deviation.   Cardiovascular:      Rate and Rhythm: Normal rate and regular rhythm.      Heart sounds: Normal heart sounds.   Pulmonary:      Effort: Respiratory distress present.      Breath sounds: Wheezing, rhonchi and rales present.   Abdominal:      General: Bowel sounds are normal. There is no distension.      Palpations: Abdomen is soft.      Tenderness: There is no guarding or rebound.   Musculoskeletal:         General: Normal range of motion.      Cervical back: Normal range of motion and neck supple.      Right lower leg: Edema present.      Left lower leg: Edema present.   Lymphadenopathy:      Cervical: No cervical adenopathy.   Skin:     General: Skin is warm and dry.      Capillary Refill: Capillary refill takes less than 2 seconds.   Neurological:      Mental Status: She is alert and oriented to person, place, and time.   Psychiatric:         Behavior: Behavior normal.         Thought Content: Thought content normal.         Judgment: Judgment normal.              CRANIAL NERVES     CN III, IV, VI   Pupils are equal, round, and reactive to light.       Significant Labs: All pertinent labs within the past 24 hours have been reviewed.  Recent Lab Results         03/01/24  1200   03/01/24  0442   03/01/24  6390    03/01/24  0424        Influenza A, Molecular   Negative           Influenza B, Molecular   Negative           Albumin       3.9       ALP       69       ALT       12       Anion Gap       10       AST       23       Baso #       0.03       Basophil %       0.5       BILIRUBIN TOTAL       0.6  Comment: For infants and newborns, interpretation of results should be based  on gestational age, weight and in agreement with clinical  observations.    Premature Infant recommended reference ranges:  Up to 24 hours.............<8.0 mg/dL  Up to 48 hours............<12.0 mg/dL  3-5 days..................<15.0 mg/dL  6-29 days.................<15.0 mg/dL         BNP       982  Comment: Values of less than 100 pg/ml are consistent with non-CHF populations.       BUN       22       Calcium       9.1       Chloride       105       CO2       26       Creatinine       0.9       Differential Method       Automated       eGFR       59.6       Eos #       0.3       Eos %       5.5       Flu A & B Source   Nasal Swab           Glucose       98       Gran # (ANC)       3.3       Gran %       56.6       Hematocrit       33.8       Hemoglobin       10.8       Immature Grans (Abs)       0.02  Comment: Mild elevation in immature granulocytes is non specific and   can be seen in a variety of conditions including stress response,   acute inflammation, trauma and pregnancy. Correlation with other   laboratory and clinical findings is essential.         Immature Granulocytes       0.3       Lymph #       1.6       Lymph %       28.4       Magnesium        1.6       MCH       31.1       MCHC       32.0       MCV       97       Mono #       0.5       Mono %       8.7       MPV       10.2       nRBC       0       QRS Duration     130         OHS QTC Calculation     481         Platelet Count       154       Potassium       4.5       PROTEIN TOTAL       7.5       RBC       3.47       RDW       13.5       SARS-CoV-2 RNA, Amplification, Qual    Negative  Comment: This test utilizes isothermal nucleic acid amplification technology   to   detect the SARS-CoV-2 RdRp nucleic acid segment. The analytical   sensitivity   (limit of detection) is 500 copies/swab.    A POSITIVE result is indicative of the presence of SARS-CoV-2 RNA;   clinical   correlation with patient history and other diagnostic information is   necessary to determine patient infection status.    A NEGATIVE result means that SARS-CoV-2 nucleic acids are not present   above   the limit of detection. A NEGATIVE result should be treated as   presumptive.   It does not rule out the possibility of COVID-19 and should not be   the sole   basis for treatment decisions.    If COVID-19 is strongly suspected based on clinical and exposure   history,   re-testing using an alternate molecular assay should be considered.    This test is Food and Drug Administration (FDA) approved. Performance   characteristics of this has been independently verified by [facility] Department of Pathology and Laboratory Medicine.             Sodium       141       Troponin I 0.046  Comment: The reference interval for Troponin I represents the 99th percentile   cutoff   for our facility and is consistent with 3rd generation assay   performance.         0.043  Comment: The reference interval for Troponin I represents the 99th percentile   cutoff   for our facility and is consistent with 3rd generation assay   performance.         WBC       5.77               Significant Imaging: I have reviewed all pertinent imaging results/findings within the past 24 hours.  I have reviewed and interpreted all pertinent imaging results/findings within the past 24 hours.  Assessment/Plan:     * Acute respiratory failure with hypoxia  Patient with Hypoxic Respiratory failure which is Acute on chronic.  she is not on home oxygen. Supplemental oxygen was provided and noted-      .   Signs/symptoms of respiratory failure include-  increased work of breathing. Contributing diagnoses includes - CHF Labs and images were reviewed. Patient Has not had a recent ABG. Will treat underlying causes and adjust management of respiratory failure as follows-     Admit for CHF  On CHF pathway  Continue diuresis with furosemide 40 mg IV BID  Given HFrEF, with hold BB therapy     NSTEMI (non-ST elevated myocardial infarction)        Heart failure with reduced ejection fraction  As above        Hypertension  Continue losartan .  Treat as needed       Hypothyroidism  Continue levothyroxine  Re check TSH      VTE Risk Mitigation (From admission, onward)           Ordered     enoxaparin injection 40 mg  Daily         03/01/24 0648     enoxaparin injection 30 mg  Daily         03/01/24 1408     IP VTE HIGH RISK PATIENT  Once         03/01/24 0648     Place sequential compression device  Until discontinued         03/01/24 0648                                    Yg Talavera MD  Department of Hospital Medicine  [place] - Intensive Care

## 2024-03-01 NOTE — PLAN OF CARE
03/01/24 1450   Medicare Message   Important Message from Medicare regarding Discharge Appeal Rights Given to patient/caregiver;Explained to patient/caregiver;Signed/date by patient/caregiver   Date IMM was signed 03/01/24   Time IMM was signed 0589

## 2024-03-01 NOTE — SUBJECTIVE & OBJECTIVE
Past Medical History:   Diagnosis Date    Anemia     CHF (congestive heart failure)     Constipation     Hypertension     Thyroid disease        Past Surgical History:   Procedure Laterality Date    CHOLECYSTECTOMY      HYSTERECTOMY         Review of patient's allergies indicates:  No Known Allergies    No current facility-administered medications on file prior to encounter.     Current Outpatient Medications on File Prior to Encounter   Medication Sig    aspirin 81 MG Chew Take 1 tablet (81 mg total) by mouth once daily.    carvediloL (COREG) 3.125 MG tablet Take 1 tablet (3.125 mg total) by mouth 2 (two) times daily.    clobetasol 0.05% (TEMOVATE) 0.05 % Oint Apply topically 2 (two) times daily. apply to heels    cyanocobalamin 1,000 mcg/mL injection Inject 1 mL (1,000 mcg total) into the muscle every 30 days.    docusate sodium (COLACE) 50 MG capsule Take by mouth 2 (two) times daily.    ferrous sulfate (FEOSOL) 325 mg (65 mg iron) Tab tablet Take 1 tablet (325 mg total) by mouth once daily.    hydroCHLOROthiazide (HYDRODIURIL) 25 MG tablet Take 1 tablet (25 mg total) by mouth once daily.    iron-vitamin C 100-250 mg, ICAR-C, 100-250 mg Tab Take 1 tablet by mouth once daily.    levothyroxine (SYNTHROID) 88 MCG tablet Take 1 tablet (88 mcg total) by mouth before breakfast.    losartan (COZAAR) 100 MG tablet Take 1 tablet (100 mg total) by mouth once daily.    polyethylene glycol (GLYCOLAX) 17 gram PwPk Take 17 g by mouth once daily.    potassium chloride SA (K-DUR,KLOR-CON M) 10 MEQ tablet Take 1 tablet (10 mEq total) by mouth once daily.     Family History    None       Tobacco Use    Smoking status: Never    Smokeless tobacco: Never   Substance and Sexual Activity    Alcohol use: No    Drug use: No    Sexual activity: Never     Review of Systems   Constitutional:  Negative for activity change, appetite change, fatigue and fever.   HENT:  Negative for congestion, ear discharge, mouth sores, nosebleeds,  rhinorrhea, sinus pressure, sinus pain and tinnitus.    Eyes: Negative.  Negative for pain, redness and itching.   Respiratory:  Positive for cough, chest tightness, shortness of breath and wheezing. Negative for apnea, choking and stridor.    Cardiovascular:  Positive for leg swelling. Negative for chest pain and palpitations.   Gastrointestinal:  Negative for abdominal distention, abdominal pain, anal bleeding, blood in stool, constipation and diarrhea.   Endocrine: Negative.    Genitourinary:  Negative for difficulty urinating, flank pain, frequency and urgency.   Musculoskeletal:  Positive for arthralgias and myalgias. Negative for back pain and gait problem.   Skin:  Negative for color change and pallor.   Allergic/Immunologic: Negative.    Neurological:  Positive for weakness. Negative for dizziness, facial asymmetry, light-headedness and headaches.   Hematological:  Negative for adenopathy. Does not bruise/bleed easily.     Objective:     Vital Signs (Most Recent):  Temp: 98.1 °F (36.7 °C) (03/01/24 1200)  Pulse: 66 (03/01/24 1200)  Resp: 16 (03/01/24 1200)  BP: 137/82 (03/01/24 1200)  SpO2: 97 % (03/01/24 1200) Vital Signs (24h Range):  Temp:  [97.7 °F (36.5 °C)-98.2 °F (36.8 °C)] 98.1 °F (36.7 °C)  Pulse:  [66-92] 66  Resp:  [12-32] 16  SpO2:  [94 %-100 %] 97 %  BP: (129-181)/() 137/82     Weight: 56.7 kg (125 lb)  Body mass index is 20.18 kg/m².     Physical Exam  Vitals and nursing note reviewed.   Constitutional:       Appearance: Normal appearance. She is well-developed.   HENT:      Head: Normocephalic and atraumatic.      Nose: Nose normal.   Eyes:      Extraocular Movements: Extraocular movements intact.      Pupils: Pupils are equal, round, and reactive to light.   Neck:      Thyroid: No thyromegaly.      Trachea: No tracheal deviation.   Cardiovascular:      Rate and Rhythm: Normal rate and regular rhythm.      Heart sounds: Normal heart sounds.   Pulmonary:      Effort: Respiratory distress  present.      Breath sounds: Wheezing, rhonchi and rales present.   Abdominal:      General: Bowel sounds are normal. There is no distension.      Palpations: Abdomen is soft.      Tenderness: There is no guarding or rebound.   Musculoskeletal:         General: Normal range of motion.      Cervical back: Normal range of motion and neck supple.      Right lower leg: Edema present.      Left lower leg: Edema present.   Lymphadenopathy:      Cervical: No cervical adenopathy.   Skin:     General: Skin is warm and dry.      Capillary Refill: Capillary refill takes less than 2 seconds.   Neurological:      Mental Status: She is alert and oriented to person, place, and time.   Psychiatric:         Behavior: Behavior normal.         Thought Content: Thought content normal.         Judgment: Judgment normal.              CRANIAL NERVES     CN III, IV, VI   Pupils are equal, round, and reactive to light.       Significant Labs: All pertinent labs within the past 24 hours have been reviewed.  Recent Lab Results         03/01/24  1200   03/01/24  0442   03/01/24  0438   03/01/24  0424        Influenza A, Molecular   Negative           Influenza B, Molecular   Negative           Albumin       3.9       ALP       69       ALT       12       Anion Gap       10       AST       23       Baso #       0.03       Basophil %       0.5       BILIRUBIN TOTAL       0.6  Comment: For infants and newborns, interpretation of results should be based  on gestational age, weight and in agreement with clinical  observations.    Premature Infant recommended reference ranges:  Up to 24 hours.............<8.0 mg/dL  Up to 48 hours............<12.0 mg/dL  3-5 days..................<15.0 mg/dL  6-29 days.................<15.0 mg/dL         BNP       982  Comment: Values of less than 100 pg/ml are consistent with non-CHF populations.       BUN       22       Calcium       9.1       Chloride       105       CO2       26       Creatinine       0.9        Differential Method       Automated       eGFR       59.6       Eos #       0.3       Eos %       5.5       Flu A & B Source   Nasal Swab           Glucose       98       Gran # (ANC)       3.3       Gran %       56.6       Hematocrit       33.8       Hemoglobin       10.8       Immature Grans (Abs)       0.02  Comment: Mild elevation in immature granulocytes is non specific and   can be seen in a variety of conditions including stress response,   acute inflammation, trauma and pregnancy. Correlation with other   laboratory and clinical findings is essential.         Immature Granulocytes       0.3       Lymph #       1.6       Lymph %       28.4       Magnesium        1.6       MCH       31.1       MCHC       32.0       MCV       97       Mono #       0.5       Mono %       8.7       MPV       10.2       nRBC       0       QRS Duration     130         OHS QTC Calculation     481         Platelet Count       154       Potassium       4.5       PROTEIN TOTAL       7.5       RBC       3.47       RDW       13.5       SARS-CoV-2 RNA, Amplification, Qual   Negative  Comment: This test utilizes isothermal nucleic acid amplification technology   to   detect the SARS-CoV-2 RdRp nucleic acid segment. The analytical   sensitivity   (limit of detection) is 500 copies/swab.    A POSITIVE result is indicative of the presence of SARS-CoV-2 RNA;   clinical   correlation with patient history and other diagnostic information is   necessary to determine patient infection status.    A NEGATIVE result means that SARS-CoV-2 nucleic acids are not present   above   the limit of detection. A NEGATIVE result should be treated as   presumptive.   It does not rule out the possibility of COVID-19 and should not be   the sole   basis for treatment decisions.    If COVID-19 is strongly suspected based on clinical and exposure   history,   re-testing using an alternate molecular assay should be considered.    This test is Food and Drug  Administration (FDA) approved. Performance   characteristics of this has been independently verified by [facility] Department of Pathology and Laboratory Medicine.             Sodium       141       Troponin I 0.046  Comment: The reference interval for Troponin I represents the 99th percentile   cutoff   for our facility and is consistent with 3rd generation assay   performance.         0.043  Comment: The reference interval for Troponin I represents the 99th percentile   cutoff   for our facility and is consistent with 3rd generation assay   performance.         WBC       5.77               Significant Imaging: I have reviewed all pertinent imaging results/findings within the past 24 hours.  I have reviewed and interpreted all pertinent imaging results/findings within the past 24 hours.

## 2024-03-02 LAB
ALBUMIN SERPL BCP-MCNC: 3.5 G/DL (ref 3.5–5.2)
ALP SERPL-CCNC: 61 U/L (ref 55–135)
ALT SERPL W/O P-5'-P-CCNC: 10 U/L (ref 10–44)
ANION GAP SERPL CALC-SCNC: 12 MMOL/L (ref 8–16)
AST SERPL-CCNC: 18 U/L (ref 10–40)
BASOPHILS # BLD AUTO: 0.01 K/UL (ref 0–0.2)
BASOPHILS NFR BLD: 0.2 % (ref 0–1.9)
BILIRUB SERPL-MCNC: 0.7 MG/DL (ref 0.1–1)
BUN SERPL-MCNC: 25 MG/DL (ref 10–30)
CALCIUM SERPL-MCNC: 9 MG/DL (ref 8.7–10.5)
CHLORIDE SERPL-SCNC: 100 MMOL/L (ref 95–110)
CO2 SERPL-SCNC: 28 MMOL/L (ref 23–29)
CREAT SERPL-MCNC: 0.9 MG/DL (ref 0.5–1.4)
DIFFERENTIAL METHOD BLD: ABNORMAL
EOSINOPHIL # BLD AUTO: 0.2 K/UL (ref 0–0.5)
EOSINOPHIL NFR BLD: 4.1 % (ref 0–8)
ERYTHROCYTE [DISTWIDTH] IN BLOOD BY AUTOMATED COUNT: 13.4 % (ref 11.5–14.5)
EST. GFR  (NO RACE VARIABLE): 59.6 ML/MIN/1.73 M^2
GLUCOSE SERPL-MCNC: 77 MG/DL (ref 70–110)
HCT VFR BLD AUTO: 32.2 % (ref 37–48.5)
HGB BLD-MCNC: 10.6 G/DL (ref 12–16)
IMM GRANULOCYTES # BLD AUTO: 0.01 K/UL (ref 0–0.04)
IMM GRANULOCYTES NFR BLD AUTO: 0.2 % (ref 0–0.5)
LYMPHOCYTES # BLD AUTO: 1.1 K/UL (ref 1–4.8)
LYMPHOCYTES NFR BLD: 25.2 % (ref 18–48)
MAGNESIUM SERPL-MCNC: 1.6 MG/DL (ref 1.6–2.6)
MCH RBC QN AUTO: 31.3 PG (ref 27–31)
MCHC RBC AUTO-ENTMCNC: 32.9 G/DL (ref 32–36)
MCV RBC AUTO: 95 FL (ref 82–98)
MONOCYTES # BLD AUTO: 0.4 K/UL (ref 0.3–1)
MONOCYTES NFR BLD: 10.6 % (ref 4–15)
NEUTROPHILS # BLD AUTO: 2.5 K/UL (ref 1.8–7.7)
NEUTROPHILS NFR BLD: 59.7 % (ref 38–73)
NRBC BLD-RTO: 0 /100 WBC
PHOSPHATE SERPL-MCNC: 3.3 MG/DL (ref 2.7–4.5)
PLATELET # BLD AUTO: 160 K/UL (ref 150–450)
PMV BLD AUTO: 10.9 FL (ref 9.2–12.9)
POTASSIUM SERPL-SCNC: 4.7 MMOL/L (ref 3.5–5.1)
PROT SERPL-MCNC: 6.8 G/DL (ref 6–8.4)
RBC # BLD AUTO: 3.39 M/UL (ref 4–5.4)
SODIUM SERPL-SCNC: 140 MMOL/L (ref 136–145)
WBC # BLD AUTO: 4.16 K/UL (ref 3.9–12.7)

## 2024-03-02 PROCEDURE — 21400001 HC TELEMETRY ROOM

## 2024-03-02 PROCEDURE — 99233 SBSQ HOSP IP/OBS HIGH 50: CPT | Mod: ,,, | Performed by: INTERNAL MEDICINE

## 2024-03-02 PROCEDURE — 83735 ASSAY OF MAGNESIUM: CPT | Performed by: INTERNAL MEDICINE

## 2024-03-02 PROCEDURE — 25000003 PHARM REV CODE 250: Performed by: INTERNAL MEDICINE

## 2024-03-02 PROCEDURE — 80053 COMPREHEN METABOLIC PANEL: CPT | Performed by: INTERNAL MEDICINE

## 2024-03-02 PROCEDURE — 63600175 PHARM REV CODE 636 W HCPCS: Performed by: INTERNAL MEDICINE

## 2024-03-02 PROCEDURE — 84100 ASSAY OF PHOSPHORUS: CPT | Performed by: INTERNAL MEDICINE

## 2024-03-02 PROCEDURE — 94761 N-INVAS EAR/PLS OXIMETRY MLT: CPT

## 2024-03-02 PROCEDURE — 85025 COMPLETE CBC W/AUTO DIFF WBC: CPT | Performed by: INTERNAL MEDICINE

## 2024-03-02 RX ADMIN — LOSARTAN POTASSIUM 100 MG: 25 TABLET, FILM COATED ORAL at 09:03

## 2024-03-02 RX ADMIN — MUPIROCIN: 20 OINTMENT TOPICAL at 09:03

## 2024-03-02 RX ADMIN — ENOXAPARIN SODIUM 30 MG: 30 INJECTION SUBCUTANEOUS at 04:03

## 2024-03-02 RX ADMIN — CARVEDILOL 3.12 MG: 3.12 TABLET, FILM COATED ORAL at 09:03

## 2024-03-02 RX ADMIN — DOCUSATE SODIUM 200 MG: 100 CAPSULE ORAL at 04:03

## 2024-03-02 RX ADMIN — FUROSEMIDE 40 MG: 10 INJECTION, SOLUTION INTRAVENOUS at 09:03

## 2024-03-02 RX ADMIN — ASPIRIN 81 MG: 81 TABLET, CHEWABLE ORAL at 09:03

## 2024-03-02 RX ADMIN — POTASSIUM CHLORIDE 10 MEQ: 750 TABLET, EXTENDED RELEASE ORAL at 09:03

## 2024-03-02 RX ADMIN — LEVOTHYROXINE SODIUM 88 MCG: 88 TABLET ORAL at 06:03

## 2024-03-02 RX ADMIN — MUPIROCIN: 20 OINTMENT TOPICAL at 08:03

## 2024-03-02 NOTE — HOSPITAL COURSE
Echo Results:    Left Ventricle: The left ventricle is normal in size. Mildly increased wall thickness. Severe global hypokinesis present. There is severely reduced systolic function with a visually estimated ejection fraction of 20 - 25%. Ejection fraction by visual approximation is 20%. Global longitudinal strain is -7%. Reduced. There is diastolic dysfunction.    Right Ventricle: Normal right ventricular cavity size. Systolic function is severely reduced. TAPSE is 1.80 cm.    Left Atrium: Left atrium is moderately dilated.    Aortic Valve: The aortic valve is a trileaflet valve. Severely restricted motion. There is severe stenosis. Aortic valve area by VTI is 0.92 cm². Aortic valve peak velocity is 1.96 m/s. Mean gradient is 9 mmHg. The dimensionless index is 0.37. There is mild aortic regurgitation.    Mitral Valve: There is moderate regurgitation.    Tricuspid Valve: There is mild regurgitation.    Pulmonic Valve: There is mild regurgitation.    IVC/SVC: Normal venous pressure at 3 mmHg.    Pericardium: There is a small effusion. No indication of cardiac tamponade.    Significant deterioation of LV function from LVEF of 45% in Echo of 7/2023. There is also progression of LAE.    Patient is a 89-year-old female with a history of congestive heart failure.  Patient presented with increasing shortness of breath and found to be in mild congestive heart failure.  Diuresis was started with Lasix 40 mg IV twice daily.  Patient responded well and had no shortness a breath during this admission.  Patient also required no oxygen supplementation.  Continue same medications and ensured this patient was on a congestive heart failure regimen.  Patient will be sent home and care for by her daughter.  We ensure that this patient will be on Lasix 40 mg daily, potassium supplementation, and we will continue her other medications as prescribed.  Patient will follow up with her primary care physician within 1 week and her  cardiologist soon after.  Patient was discharged in stable condition.

## 2024-03-02 NOTE — SUBJECTIVE & OBJECTIVE
Past Medical History:   Diagnosis Date    Anemia     CHF (congestive heart failure)     Constipation     Hypertension     Thyroid disease        Past Surgical History:   Procedure Laterality Date    CHOLECYSTECTOMY      HYSTERECTOMY         Review of patient's allergies indicates:  No Known Allergies    No current facility-administered medications on file prior to encounter.     Current Outpatient Medications on File Prior to Encounter   Medication Sig    aspirin 81 MG Chew Take 1 tablet (81 mg total) by mouth once daily.    carvediloL (COREG) 3.125 MG tablet Take 1 tablet (3.125 mg total) by mouth 2 (two) times daily.    clobetasol 0.05% (TEMOVATE) 0.05 % Oint Apply topically 2 (two) times daily. apply to heels    cyanocobalamin 1,000 mcg/mL injection Inject 1 mL (1,000 mcg total) into the muscle every 30 days.    docusate sodium (COLACE) 50 MG capsule Take by mouth 2 (two) times daily.    ferrous sulfate (FEOSOL) 325 mg (65 mg iron) Tab tablet Take 1 tablet (325 mg total) by mouth once daily.    hydroCHLOROthiazide (HYDRODIURIL) 25 MG tablet Take 1 tablet (25 mg total) by mouth once daily.    iron-vitamin C 100-250 mg, ICAR-C, 100-250 mg Tab Take 1 tablet by mouth once daily.    levothyroxine (SYNTHROID) 88 MCG tablet Take 1 tablet (88 mcg total) by mouth before breakfast.    losartan (COZAAR) 100 MG tablet Take 1 tablet (100 mg total) by mouth once daily.    polyethylene glycol (GLYCOLAX) 17 gram PwPk Take 17 g by mouth once daily.    potassium chloride SA (K-DUR,KLOR-CON M) 10 MEQ tablet Take 1 tablet (10 mEq total) by mouth once daily.     Family History    None       Tobacco Use    Smoking status: Never    Smokeless tobacco: Never   Substance and Sexual Activity    Alcohol use: No    Drug use: No    Sexual activity: Never     Review of Systems   Constitutional:  Negative for activity change, appetite change, fatigue and fever.   HENT:  Negative for congestion, ear discharge, mouth sores, nosebleeds,  rhinorrhea, sinus pressure, sinus pain and tinnitus.    Eyes: Negative.  Negative for pain, redness and itching.   Respiratory:  Positive for cough, chest tightness, shortness of breath and wheezing. Negative for apnea, choking and stridor.    Cardiovascular:  Positive for leg swelling. Negative for chest pain and palpitations.   Gastrointestinal:  Negative for abdominal distention, abdominal pain, anal bleeding, blood in stool, constipation and diarrhea.   Endocrine: Negative.    Genitourinary:  Negative for difficulty urinating, flank pain, frequency and urgency.   Musculoskeletal:  Positive for arthralgias and myalgias. Negative for back pain and gait problem.   Skin:  Negative for color change and pallor.   Allergic/Immunologic: Negative.    Neurological:  Positive for weakness. Negative for dizziness, facial asymmetry, light-headedness and headaches.   Hematological:  Negative for adenopathy. Does not bruise/bleed easily.     Objective:     Vital Signs (Most Recent):  Temp: 98.1 °F (36.7 °C) (03/02/24 1145)  Pulse: 71 (03/02/24 1145)  Resp: (!) 22 (03/02/24 1145)  BP: 115/75 (03/02/24 1145)  SpO2: 96 % (03/02/24 1145) Vital Signs (24h Range):  Temp:  [97.7 °F (36.5 °C)-98.1 °F (36.7 °C)] 98.1 °F (36.7 °C)  Pulse:  [69-82] 71  Resp:  [13-26] 22  SpO2:  [94 %-97 %] 96 %  BP: (114-133)/(64-85) 115/75     Weight: 54.6 kg (120 lb 5.9 oz)  Body mass index is 19.43 kg/m².     Physical Exam  Vitals and nursing note reviewed.   Constitutional:       Appearance: Normal appearance. She is well-developed.   HENT:      Head: Normocephalic and atraumatic.      Nose: Nose normal.   Eyes:      Extraocular Movements: Extraocular movements intact.      Pupils: Pupils are equal, round, and reactive to light.   Neck:      Thyroid: No thyromegaly.      Trachea: No tracheal deviation.   Cardiovascular:      Rate and Rhythm: Normal rate and regular rhythm.      Heart sounds: Normal heart sounds.   Pulmonary:      Effort: Respiratory  distress present.      Breath sounds: Wheezing, rhonchi and rales present.   Abdominal:      General: Bowel sounds are normal. There is no distension.      Palpations: Abdomen is soft.      Tenderness: There is no guarding or rebound.   Musculoskeletal:         General: Normal range of motion.      Cervical back: Normal range of motion and neck supple.      Right lower leg: Edema present.      Left lower leg: Edema present.   Lymphadenopathy:      Cervical: No cervical adenopathy.   Skin:     General: Skin is warm and dry.      Capillary Refill: Capillary refill takes less than 2 seconds.   Neurological:      Mental Status: She is alert and oriented to person, place, and time.   Psychiatric:         Behavior: Behavior normal.         Thought Content: Thought content normal.         Judgment: Judgment normal.              CRANIAL NERVES     CN III, IV, VI   Pupils are equal, round, and reactive to light.       Significant Labs: All pertinent labs within the past 24 hours have been reviewed.  Recent Lab Results         03/02/24  0322        Albumin 3.5       ALP 61       ALT 10       Anion Gap 12       AST 18       Baso # 0.01       Basophil % 0.2       BILIRUBIN TOTAL 0.7  Comment: For infants and newborns, interpretation of results should be based  on gestational age, weight and in agreement with clinical  observations.    Premature Infant recommended reference ranges:  Up to 24 hours.............<8.0 mg/dL  Up to 48 hours............<12.0 mg/dL  3-5 days..................<15.0 mg/dL  6-29 days.................<15.0 mg/dL         BUN 25       Calcium 9.0       Chloride 100       CO2 28       Creatinine 0.9       Differential Method Automated       eGFR 59.6       Eos # 0.2       Eos % 4.1       Glucose 77       Gran # (ANC) 2.5       Gran % 59.7       Hematocrit 32.2       Hemoglobin 10.6       Immature Grans (Abs) 0.01  Comment: Mild elevation in immature granulocytes is non specific and   can be seen in a variety  of conditions including stress response,   acute inflammation, trauma and pregnancy. Correlation with other   laboratory and clinical findings is essential.         Immature Granulocytes 0.2       Lymph # 1.1       Lymph % 25.2       Magnesium  1.6       MCH 31.3       MCHC 32.9       MCV 95       Mono # 0.4       Mono % 10.6       MPV 10.9       nRBC 0       Phosphorus Level 3.3       Platelet Count 160       Potassium 4.7       PROTEIN TOTAL 6.8       RBC 3.39       RDW 13.4       Sodium 140       WBC 4.16               Significant Imaging: I have reviewed all pertinent imaging results/findings within the past 24 hours.  I have reviewed and interpreted all pertinent imaging results/findings within the past 24 hours.

## 2024-03-02 NOTE — PROGRESS NOTES
[facility] Medicine  Progress Note    Patient Name: Re Arciniega  MRN: [id number]  Patient Class: IP- Inpatient   Admission Date: 3/1/2024  Length of Stay: 1 days  Attending Physician: Yg Talavera MD  Primary Care Provider: Anthony Emanuel MD        Subjective:     Principal Problem:Acute respiratory failure with hypoxia        HPI:  Transthoracic echocardiogram was completed on 01/30/2024 results are as follows:    Left Ventricle: The left ventricle is normal in size. Mildly increased wall thickness. Severe global hypokinesis present. There is severely reduced systolic function with a visually estimated ejection fraction of 20 - 25%. Ejection fraction by visual approximation is 20%. Global longitudinal strain is -7%. Reduced. There is diastolic dysfunction.  Right Ventricle: Normal right ventricular cavity size. Systolic function is severely reduced. TAPSE is 1.80 cm.  Left Atrium: Left atrium is moderately dilated.  Aortic Valve: The aortic valve is a trileaflet valve. Severely restricted motion. There is severe stenosis. Aortic valve area by VTI is 0.92 cm². Aortic valve peak velocity is 1.96 m/s. Mean gradient is 9 mmHg. The dimensionless index is 0.37. There is mild aortic regurgitation.  Mitral Valve: There is moderate regurgitation.  Tricuspid Valve: There is mild regurgitation.  Pulmonic Valve: There is mild regurgitation.  IVC/SVC: Normal venous pressure at 3 mmHg.  Pericardium: There is a small effusion. No indication of cardiac tamponade.  Significant deterioation of LV function from LVEF of 45% in Echo of 7/2023. There is also progression of LAE.     Patient is a 89-year-old female that has a past medical history of congestive heart failure hypertension hypothyroidism anemia and constipation patient presented last evening with a history of shortness of breath that has worsened over the past 2 days prior to presentation.  Patient states that she seemed to become worse over  the last 1 day but it started approximately 2 days ago.  Patient was recently hospitalized for similar case with increased troponin on that visit.  Echocardiogram results are listed above.  Patient does have history of congestive heart failure and states that she was not on her Lasix at home.  Patient was discharged on the previous admission by me to [facility].  Patient now lives with her son at home.  Patient has difficulty with taking care of herself at home.  However she does have help from her son and daughter.  Patient states that she had 2 pillow orthopnea and states that she could not lay flat without becoming more shortness short of breath.    Overview/Hospital Course:  No notes on file    Past Medical History:   Diagnosis Date    Anemia     CHF (congestive heart failure)     Constipation     Hypertension     Thyroid disease        Past Surgical History:   Procedure Laterality Date    CHOLECYSTECTOMY      HYSTERECTOMY         Review of patient's allergies indicates:  No Known Allergies    No current facility-administered medications on file prior to encounter.     Current Outpatient Medications on File Prior to Encounter   Medication Sig    aspirin 81 MG Chew Take 1 tablet (81 mg total) by mouth once daily.    carvediloL (COREG) 3.125 MG tablet Take 1 tablet (3.125 mg total) by mouth 2 (two) times daily.    clobetasol 0.05% (TEMOVATE) 0.05 % Oint Apply topically 2 (two) times daily. apply to heels    cyanocobalamin 1,000 mcg/mL injection Inject 1 mL (1,000 mcg total) into the muscle every 30 days.    docusate sodium (COLACE) 50 MG capsule Take by mouth 2 (two) times daily.    ferrous sulfate (FEOSOL) 325 mg (65 mg iron) Tab tablet Take 1 tablet (325 mg total) by mouth once daily.    hydroCHLOROthiazide (HYDRODIURIL) 25 MG tablet Take 1 tablet (25 mg total) by mouth once daily.    iron-vitamin C 100-250 mg, ICAR-C, 100-250 mg Tab Take 1 tablet by mouth once daily.    levothyroxine (SYNTHROID) 88 MCG  tablet Take 1 tablet (88 mcg total) by mouth before breakfast.    losartan (COZAAR) 100 MG tablet Take 1 tablet (100 mg total) by mouth once daily.    polyethylene glycol (GLYCOLAX) 17 gram PwPk Take 17 g by mouth once daily.    potassium chloride SA (K-DUR,KLOR-CON M) 10 MEQ tablet Take 1 tablet (10 mEq total) by mouth once daily.     Family History    None       Tobacco Use    Smoking status: Never    Smokeless tobacco: Never   Substance and Sexual Activity    Alcohol use: No    Drug use: No    Sexual activity: Never     Review of Systems   Constitutional:  Negative for activity change, appetite change, fatigue and fever.   HENT:  Negative for congestion, ear discharge, mouth sores, nosebleeds, rhinorrhea, sinus pressure, sinus pain and tinnitus.    Eyes: Negative.  Negative for pain, redness and itching.   Respiratory:  Positive for cough, chest tightness, shortness of breath and wheezing. Negative for apnea, choking and stridor.    Cardiovascular:  Positive for leg swelling. Negative for chest pain and palpitations.   Gastrointestinal:  Negative for abdominal distention, abdominal pain, anal bleeding, blood in stool, constipation and diarrhea.   Endocrine: Negative.    Genitourinary:  Negative for difficulty urinating, flank pain, frequency and urgency.   Musculoskeletal:  Positive for arthralgias and myalgias. Negative for back pain and gait problem.   Skin:  Negative for color change and pallor.   Allergic/Immunologic: Negative.    Neurological:  Positive for weakness. Negative for dizziness, facial asymmetry, light-headedness and headaches.   Hematological:  Negative for adenopathy. Does not bruise/bleed easily.     Objective:     Vital Signs (Most Recent):  Temp: 98.1 °F (36.7 °C) (03/02/24 1145)  Pulse: 71 (03/02/24 1145)  Resp: (!) 22 (03/02/24 1145)  BP: 115/75 (03/02/24 1145)  SpO2: 96 % (03/02/24 1145) Vital Signs (24h Range):  Temp:  [97.7 °F (36.5 °C)-98.1 °F (36.7 °C)] 98.1 °F (36.7 °C)  Pulse:   [69-82] 71  Resp:  [13-26] 22  SpO2:  [94 %-97 %] 96 %  BP: (114-133)/(64-85) 115/75     Weight: 54.6 kg (120 lb 5.9 oz)  Body mass index is 19.43 kg/m².     Physical Exam  Vitals and nursing note reviewed.   Constitutional:       Appearance: Normal appearance. She is well-developed.   HENT:      Head: Normocephalic and atraumatic.      Nose: Nose normal.   Eyes:      Extraocular Movements: Extraocular movements intact.      Pupils: Pupils are equal, round, and reactive to light.   Neck:      Thyroid: No thyromegaly.      Trachea: No tracheal deviation.   Cardiovascular:      Rate and Rhythm: Normal rate and regular rhythm.      Heart sounds: Normal heart sounds.   Pulmonary:      Effort: Respiratory distress present.      Breath sounds: Wheezing, rhonchi and rales present.   Abdominal:      General: Bowel sounds are normal. There is no distension.      Palpations: Abdomen is soft.      Tenderness: There is no guarding or rebound.   Musculoskeletal:         General: Normal range of motion.      Cervical back: Normal range of motion and neck supple.      Right lower leg: Edema present.      Left lower leg: Edema present.   Lymphadenopathy:      Cervical: No cervical adenopathy.   Skin:     General: Skin is warm and dry.      Capillary Refill: Capillary refill takes less than 2 seconds.   Neurological:      Mental Status: She is alert and oriented to person, place, and time.   Psychiatric:         Behavior: Behavior normal.         Thought Content: Thought content normal.         Judgment: Judgment normal.              CRANIAL NERVES     CN III, IV, VI   Pupils are equal, round, and reactive to light.       Significant Labs: All pertinent labs within the past 24 hours have been reviewed.  Recent Lab Results         03/02/24  0322        Albumin 3.5       ALP 61       ALT 10       Anion Gap 12       AST 18       Baso # 0.01       Basophil % 0.2       BILIRUBIN TOTAL 0.7  Comment: For infants and newborns,  interpretation of results should be based  on gestational age, weight and in agreement with clinical  observations.    Premature Infant recommended reference ranges:  Up to 24 hours.............<8.0 mg/dL  Up to 48 hours............<12.0 mg/dL  3-5 days..................<15.0 mg/dL  6-29 days.................<15.0 mg/dL         BUN 25       Calcium 9.0       Chloride 100       CO2 28       Creatinine 0.9       Differential Method Automated       eGFR 59.6       Eos # 0.2       Eos % 4.1       Glucose 77       Gran # (ANC) 2.5       Gran % 59.7       Hematocrit 32.2       Hemoglobin 10.6       Immature Grans (Abs) 0.01  Comment: Mild elevation in immature granulocytes is non specific and   can be seen in a variety of conditions including stress response,   acute inflammation, trauma and pregnancy. Correlation with other   laboratory and clinical findings is essential.         Immature Granulocytes 0.2       Lymph # 1.1       Lymph % 25.2       Magnesium  1.6       MCH 31.3       MCHC 32.9       MCV 95       Mono # 0.4       Mono % 10.6       MPV 10.9       nRBC 0       Phosphorus Level 3.3       Platelet Count 160       Potassium 4.7       PROTEIN TOTAL 6.8       RBC 3.39       RDW 13.4       Sodium 140       WBC 4.16               Significant Imaging: I have reviewed all pertinent imaging results/findings within the past 24 hours.  I have reviewed and interpreted all pertinent imaging results/findings within the past 24 hours.    Assessment/Plan:      * Acute respiratory failure with hypoxia  Patient with Hypoxic Respiratory failure which is Acute on chronic.  she is not on home oxygen. Supplemental oxygen was provided and noted-      .   Signs/symptoms of respiratory failure include- increased work of breathing. Contributing diagnoses includes - CHF Labs and images were reviewed. Patient Has not had a recent ABG. Will treat underlying causes and adjust management of respiratory failure as follows-     Admit for  CHF  On CHF pathway  Continue diuresis with furosemide 40 mg IV BID  Given HFrEF, with hold BB therapy     Heart failure with reduced ejection fraction  As above        NSTEMI (non-ST elevated myocardial infarction)        Hypertension  Continue losartan .  Treat as needed       Hypothyroidism  Continue levothyroxine  Re check TSH      VTE Risk Mitigation (From admission, onward)           Ordered     enoxaparin injection 30 mg  Daily         03/01/24 1408     IP VTE HIGH RISK PATIENT  Once         03/01/24 0648     Place sequential compression device  Until discontinued         03/01/24 0648                    Discharge Planning   ART: 3/4/2024     Code Status: DNR   Is the patient medically ready for discharge?:     Reason for patient still in hospital (select all that apply): Treatment  Discharge Plan A: Home with family                  Yg Talavera MD  Department of Hospital Medicine   [facility] - Intensive Care

## 2024-03-02 NOTE — NURSING
Report received.  Assessment done.  VSS.  Awake and oriented.  Respirations even and unlabored.   Plan of care discussed.  Verbalized understanding.  External catheter is in place.  Pads clean and dry.  Call light within reach.  Sitting up in bed, eating breakfast.  See flowsheet for further assessment.

## 2024-03-02 NOTE — PLAN OF CARE
Problem: Fall Injury Risk  Goal: Absence of Fall and Fall-Related Injury  Outcome: Ongoing, Progressing  Intervention: Promote Injury-Free Environment  Flowsheets (Taken 3/2/2024 0031)  Safety Promotion/Fall Prevention:   pulse ox   side rails raised x 2     Problem: Cardiac Output Decreased (Heart Failure)  Goal: Optimal Cardiac Output  Outcome: Ongoing, Progressing  Intervention: Optimize Cardiac Output  Flowsheets (Taken 3/2/2024 0031)  Environmental Support:   calm environment promoted   environmental consistency promoted     Problem: Functional Ability Impaired (Heart Failure)  Goal: Optimal Functional Ability  Outcome: Ongoing, Progressing  Intervention: Optimize Functional Ability  Flowsheets (Taken 3/2/2024 0031)  Activity Management: Rolling - L1     Problem: Respiratory Compromise (Heart Failure)  Goal: Effective Oxygenation and Ventilation  Outcome: Ongoing, Progressing  Intervention: Optimize Oxygenation and Ventilation  Flowsheets (Taken 3/2/2024 0031)  Airway/Ventilation Management: airway patency maintained

## 2024-03-03 VITALS
HEART RATE: 81 BPM | WEIGHT: 120.38 LBS | TEMPERATURE: 99 F | RESPIRATION RATE: 16 BRPM | HEIGHT: 66 IN | DIASTOLIC BLOOD PRESSURE: 75 MMHG | SYSTOLIC BLOOD PRESSURE: 138 MMHG | BODY MASS INDEX: 19.35 KG/M2 | OXYGEN SATURATION: 96 %

## 2024-03-03 LAB
ALBUMIN SERPL BCP-MCNC: 3.4 G/DL (ref 3.5–5.2)
ALP SERPL-CCNC: 63 U/L (ref 55–135)
ALT SERPL W/O P-5'-P-CCNC: 7 U/L (ref 10–44)
ANION GAP SERPL CALC-SCNC: 11 MMOL/L (ref 8–16)
AST SERPL-CCNC: 16 U/L (ref 10–40)
BASOPHILS # BLD AUTO: 0.01 K/UL (ref 0–0.2)
BASOPHILS NFR BLD: 0.2 % (ref 0–1.9)
BILIRUB SERPL-MCNC: 0.5 MG/DL (ref 0.1–1)
BUN SERPL-MCNC: 29 MG/DL (ref 10–30)
CALCIUM SERPL-MCNC: 9.3 MG/DL (ref 8.7–10.5)
CHLORIDE SERPL-SCNC: 98 MMOL/L (ref 95–110)
CO2 SERPL-SCNC: 29 MMOL/L (ref 23–29)
CREAT SERPL-MCNC: 1 MG/DL (ref 0.5–1.4)
DIFFERENTIAL METHOD BLD: ABNORMAL
EOSINOPHIL # BLD AUTO: 0.2 K/UL (ref 0–0.5)
EOSINOPHIL NFR BLD: 4.7 % (ref 0–8)
ERYTHROCYTE [DISTWIDTH] IN BLOOD BY AUTOMATED COUNT: 13.2 % (ref 11.5–14.5)
EST. GFR  (NO RACE VARIABLE): 52.5 ML/MIN/1.73 M^2
GLUCOSE SERPL-MCNC: 88 MG/DL (ref 70–110)
HCT VFR BLD AUTO: 35.1 % (ref 37–48.5)
HGB BLD-MCNC: 11.5 G/DL (ref 12–16)
IMM GRANULOCYTES # BLD AUTO: 0.01 K/UL (ref 0–0.04)
IMM GRANULOCYTES NFR BLD AUTO: 0.2 % (ref 0–0.5)
LYMPHOCYTES # BLD AUTO: 1.2 K/UL (ref 1–4.8)
LYMPHOCYTES NFR BLD: 25.9 % (ref 18–48)
MCH RBC QN AUTO: 31.6 PG (ref 27–31)
MCHC RBC AUTO-ENTMCNC: 32.8 G/DL (ref 32–36)
MCV RBC AUTO: 96 FL (ref 82–98)
MONOCYTES # BLD AUTO: 0.6 K/UL (ref 0.3–1)
MONOCYTES NFR BLD: 12.4 % (ref 4–15)
NEUTROPHILS # BLD AUTO: 2.6 K/UL (ref 1.8–7.7)
NEUTROPHILS NFR BLD: 56.6 % (ref 38–73)
NRBC BLD-RTO: 0 /100 WBC
PLATELET # BLD AUTO: 174 K/UL (ref 150–450)
PMV BLD AUTO: 10 FL (ref 9.2–12.9)
POTASSIUM SERPL-SCNC: 4.7 MMOL/L (ref 3.5–5.1)
PROT SERPL-MCNC: 6.9 G/DL (ref 6–8.4)
RBC # BLD AUTO: 3.64 M/UL (ref 4–5.4)
SODIUM SERPL-SCNC: 138 MMOL/L (ref 136–145)
WBC # BLD AUTO: 4.51 K/UL (ref 3.9–12.7)

## 2024-03-03 PROCEDURE — 25000003 PHARM REV CODE 250: Performed by: INTERNAL MEDICINE

## 2024-03-03 PROCEDURE — 36415 COLL VENOUS BLD VENIPUNCTURE: CPT | Performed by: INTERNAL MEDICINE

## 2024-03-03 PROCEDURE — 63600175 PHARM REV CODE 636 W HCPCS: Performed by: INTERNAL MEDICINE

## 2024-03-03 PROCEDURE — 80053 COMPREHEN METABOLIC PANEL: CPT | Performed by: INTERNAL MEDICINE

## 2024-03-03 PROCEDURE — 94761 N-INVAS EAR/PLS OXIMETRY MLT: CPT

## 2024-03-03 PROCEDURE — 99238 HOSP IP/OBS DSCHRG MGMT 30/<: CPT | Mod: ,,, | Performed by: INTERNAL MEDICINE

## 2024-03-03 PROCEDURE — 85025 COMPLETE CBC W/AUTO DIFF WBC: CPT | Performed by: INTERNAL MEDICINE

## 2024-03-03 RX ORDER — FUROSEMIDE 40 MG/1
40 TABLET ORAL DAILY
Qty: 30 TABLET | Refills: 11 | Status: SHIPPED | OUTPATIENT
Start: 2024-03-03 | End: 2025-03-03

## 2024-03-03 RX ADMIN — LEVOTHYROXINE SODIUM 88 MCG: 88 TABLET ORAL at 06:03

## 2024-03-03 RX ADMIN — FERROUS GLUCONATE: 324 TABLET ORAL at 08:03

## 2024-03-03 RX ADMIN — MUPIROCIN: 20 OINTMENT TOPICAL at 08:03

## 2024-03-03 RX ADMIN — ASPIRIN 81 MG: 81 TABLET, CHEWABLE ORAL at 08:03

## 2024-03-03 RX ADMIN — FUROSEMIDE 40 MG: 10 INJECTION, SOLUTION INTRAVENOUS at 08:03

## 2024-03-03 RX ADMIN — POTASSIUM CHLORIDE 10 MEQ: 750 TABLET, EXTENDED RELEASE ORAL at 08:03

## 2024-03-03 RX ADMIN — LOSARTAN POTASSIUM 100 MG: 25 TABLET, FILM COATED ORAL at 08:03

## 2024-03-03 RX ADMIN — CARVEDILOL 3.12 MG: 3.12 TABLET, FILM COATED ORAL at 08:03

## 2024-03-03 NOTE — DISCHARGE SUMMARY
[facility]  Discharge Summary      Patient Name: Re Arciniega  MRN: [id number]  JESSICA: [id number]  Patient Class: IP- Inpatient  Admission Date: 3/1/2024  Hospital Length of Stay: 2 days  Discharge Date and Time:  03/03/2024 3:11 PM  Attending Physician: Yg Talavera MD   Discharging Provider: Yg Talavera MD  Primary Care Provider: Anthony Emanuel MD    Primary Care Team: Networked reference to record PCT     HPI:   Transthoracic echocardiogram was completed on 01/30/2024 results are as follows:    Left Ventricle: The left ventricle is normal in size. Mildly increased wall thickness. Severe global hypokinesis present. There is severely reduced systolic function with a visually estimated ejection fraction of 20 - 25%. Ejection fraction by visual approximation is 20%. Global longitudinal strain is -7%. Reduced. There is diastolic dysfunction.  Right Ventricle: Normal right ventricular cavity size. Systolic function is severely reduced. TAPSE is 1.80 cm.  Left Atrium: Left atrium is moderately dilated.  Aortic Valve: The aortic valve is a trileaflet valve. Severely restricted motion. There is severe stenosis. Aortic valve area by VTI is 0.92 cm². Aortic valve peak velocity is 1.96 m/s. Mean gradient is 9 mmHg. The dimensionless index is 0.37. There is mild aortic regurgitation.  Mitral Valve: There is moderate regurgitation.  Tricuspid Valve: There is mild regurgitation.  Pulmonic Valve: There is mild regurgitation.  IVC/SVC: Normal venous pressure at 3 mmHg.  Pericardium: There is a small effusion. No indication of cardiac tamponade.  Significant deterioation of LV function from LVEF of 45% in Echo of 7/2023. There is also progression of LAE.     Patient is a 89-year-old female that has a past medical history of congestive heart failure hypertension hypothyroidism anemia and constipation patient presented last evening with a history of shortness of breath that has worsened  over the past 2 days prior to presentation.  Patient states that she seemed to become worse over the last 1 day but it started approximately 2 days ago.  Patient was recently hospitalized for similar case with increased troponin on that visit.  Echocardiogram results are listed above.  Patient does have history of congestive heart failure and states that she was not on her Lasix at home.  Patient was discharged on the previous admission by me to [facility].  Patient now lives with her son at home.  Patient has difficulty with taking care of herself at home.  However she does have help from her son and daughter.  Patient states that she had 2 pillow orthopnea and states that she could not lay flat without becoming more shortness short of breath.    * No surgery found *      Hospital Course:   Echo Results:    Left Ventricle: The left ventricle is normal in size. Mildly increased wall thickness. Severe global hypokinesis present. There is severely reduced systolic function with a visually estimated ejection fraction of 20 - 25%. Ejection fraction by visual approximation is 20%. Global longitudinal strain is -7%. Reduced. There is diastolic dysfunction.    Right Ventricle: Normal right ventricular cavity size. Systolic function is severely reduced. TAPSE is 1.80 cm.    Left Atrium: Left atrium is moderately dilated.    Aortic Valve: The aortic valve is a trileaflet valve. Severely restricted motion. There is severe stenosis. Aortic valve area by VTI is 0.92 cm². Aortic valve peak velocity is 1.96 m/s. Mean gradient is 9 mmHg. The dimensionless index is 0.37. There is mild aortic regurgitation.    Mitral Valve: There is moderate regurgitation.    Tricuspid Valve: There is mild regurgitation.    Pulmonic Valve: There is mild regurgitation.    IVC/SVC: Normal venous pressure at 3 mmHg.    Pericardium: There is a small effusion. No indication of cardiac tamponade.    Significant deterioation of LV function from LVEF of  45% in Echo of 7/2023. There is also progression of LAE.    Patient is a 89-year-old female with a history of congestive heart failure.  Patient presented with increasing shortness of breath and found to be in mild congestive heart failure.  Diuresis was started with Lasix 40 mg IV twice daily.  Patient responded well and had no shortness a breath during this admission.  Patient also required no oxygen supplementation.  Continue same medications and ensured this patient was on a congestive heart failure regimen.  Patient will be sent home and care for by her daughter.  We ensure that this patient will be on Lasix 40 mg daily, potassium supplementation, and we will continue her other medications as prescribed.  Patient will follow up with her primary care physician within 1 week and her cardiologist soon after.  Patient was discharged in stable condition.     Goals of Care Treatment Preferences:  Code Status: DNR      Consults:   Consults (From admission, onward)          Status Ordering Provider     Inpatient consult to   Once        Provider:  (Not yet assigned)    Acknowledged VELVET CLARKE     Inpatient consult to Social Work/Case Management  Once        Provider:  (Not yet assigned)    Acknowledged VELVET CLARKE     Inpatient consult to Registered Dietitian/Nutritionist  Once        Provider:  (Not yet assigned)    Completed VELVET CLARKE            No new Assessment & Plan notes have been filed under this hospital service since the last note was generated.  Service: Hospital Medicine    Final Active Diagnoses:    Diagnosis Date Noted POA    PRINCIPAL PROBLEM:  Acute respiratory failure with hypoxia [J96.01] 01/27/2024 Yes    Heart failure with reduced ejection fraction [I50.20] 01/27/2024 Yes    Hypertension [I10] 01/27/2024 Yes    NSTEMI (non-ST elevated myocardial infarction) [I21.4] 01/27/2024 Yes    Hypothyroidism [E03.9] 01/27/2024 Yes      Problems Resolved During this Admission:        Discharged Condition: stable    Disposition:     Follow Up:   Follow-up Information       Anthony Emanuel MD. Go on 3/14/2024.    Specialty: Family Medicine  Why: appointment Thursday March 14th at 1:00pm for follow up  Contact information:  [address]  [facility] [place] [address]  [phone]               Hakan Bradley MD Follow up.    Specialties: Cardiology, General Surgery  Why: someone will call with follow up appointment  Contact information:  [address]  [facility] [place] [address] [phone]                           Patient Instructions:   No discharge procedures on file.    Significant Diagnostic Studies: Labs: All labs within the past 24 hours have been reviewed    Pending Diagnostic Studies:       None           Medications:  Reconciled Home Medications:      Medication List        START taking these medications      furosemide 40 MG tablet  Commonly known as: LASIX  Take 1 tablet (40 mg total) by mouth once daily.            CONTINUE taking these medications      aspirin 81 MG Chew  Take 1 tablet (81 mg total) by mouth once daily.     carvediloL 3.125 MG tablet  Commonly known as: COREG  Take 1 tablet (3.125 mg total) by mouth 2 (two) times daily.     clobetasol 0.05% 0.05 % Oint  Commonly known as: TEMOVATE  Apply topically 2 (two) times daily. apply to heels     cyanocobalamin 1,000 mcg/mL injection  Inject 1 mL (1,000 mcg total) into the muscle every 30 days.     docusate sodium 50 MG capsule  Commonly known as: COLACE  Take by mouth 2 (two) times daily.     ferrous sulfate 325 mg (65 mg iron) Tab tablet  Commonly known as: FEOSOL  Take 1 tablet (325 mg total) by mouth once daily.     hydroCHLOROthiazide 25 MG tablet  Commonly known as: HYDRODIURIL  Take 1 tablet (25 mg total) by mouth once daily.     iron-vitamin C 100-250 mg (ICAR-C) 100-250 mg Tab  Take 1 tablet by mouth once daily.     levothyroxine 88 MCG tablet  Commonly known as: SYNTHROID  Take 1 tablet (88 mcg total) by  mouth before breakfast.     losartan 100 MG tablet  Commonly known as: COZAAR  Take 1 tablet (100 mg total) by mouth once daily.     polyethylene glycol 17 gram Pwpk  Commonly known as: GLYCOLAX  Take 17 g by mouth once daily.     potassium chloride SA 10 MEQ tablet  Commonly known as: K-DUR,KLOR-CON M  Take 1 tablet (10 mEq total) by mouth once daily.              Indwelling Lines/Drains at time of discharge:   Lines/Drains/Airways       Drain  Duration             Female External Urinary Catheter w/ Suction 03/01/24 1109 2 days                    Time spent on the discharge of patient: 35 minutes         Yg Talavera MD  Department of Hospital Medicine  [place] - Intensive Care

## 2024-03-03 NOTE — NURSING
Discharge packet and new medication reviewed with patient and patient's family member, verbalized understanding.  Patient wheeled out to private vehicle at this time, NAD noted or verbalized by patient at present.

## 2024-03-03 NOTE — PLAN OF CARE
Problem: Adult Inpatient Plan of Care  Goal: Plan of Care Review  Outcome: Ongoing, Progressing  Goal: Patient-Specific Goal (Individualized)  Outcome: Ongoing, Progressing  Goal: Absence of Hospital-Acquired Illness or Injury  Outcome: Ongoing, Progressing  Goal: Optimal Comfort and Wellbeing  Outcome: Ongoing, Progressing  Goal: Readiness for Transition of Care  Outcome: Ongoing, Progressing     Problem: Fall Injury Risk  Goal: Absence of Fall and Fall-Related Injury  Outcome: Ongoing, Progressing     Problem: Skin Injury Risk Increased  Goal: Skin Health and Integrity  Outcome: Ongoing, Progressing     Problem: Adjustment to Illness (Heart Failure)  Goal: Optimal Coping  Outcome: Ongoing, Progressing     Problem: Cardiac Output Decreased (Heart Failure)  Goal: Optimal Cardiac Output  Outcome: Ongoing, Progressing     Problem: Dysrhythmia (Heart Failure)  Goal: Stable Heart Rate and Rhythm  Outcome: Ongoing, Progressing     Problem: Fluid Imbalance (Heart Failure)  Goal: Fluid Balance  Outcome: Ongoing, Progressing     Problem: Functional Ability Impaired (Heart Failure)  Goal: Optimal Functional Ability  Outcome: Ongoing, Progressing     Problem: Oral Intake Inadequate (Heart Failure)  Goal: Optimal Nutrition Intake  Outcome: Ongoing, Progressing     Problem: Respiratory Compromise (Heart Failure)  Goal: Effective Oxygenation and Ventilation  Outcome: Ongoing, Progressing     Problem: Sleep Disordered Breathing (Heart Failure)  Goal: Effective Breathing Pattern During Sleep  Outcome: Ongoing, Progressing

## 2024-03-13 ENCOUNTER — OFFICE VISIT (OUTPATIENT)
Dept: CARDIOLOGY | Facility: CLINIC | Age: 89
End: 2024-03-13
Payer: MEDICARE

## 2024-03-13 VITALS
BODY MASS INDEX: 18.72 KG/M2 | HEART RATE: 76 BPM | DIASTOLIC BLOOD PRESSURE: 60 MMHG | OXYGEN SATURATION: 98 % | WEIGHT: 116 LBS | SYSTOLIC BLOOD PRESSURE: 131 MMHG

## 2024-03-13 DIAGNOSIS — I50.9 CONGESTIVE HEART FAILURE, UNSPECIFIED HF CHRONICITY, UNSPECIFIED HEART FAILURE TYPE: ICD-10-CM

## 2024-03-13 DIAGNOSIS — Z87.09 HISTORY OF ASTHMA: ICD-10-CM

## 2024-03-13 DIAGNOSIS — I44.7 LBBB (LEFT BUNDLE BRANCH BLOCK): ICD-10-CM

## 2024-03-13 DIAGNOSIS — Z98.890 S/P DILATATION OF ESOPHAGEAL STRICTURE: ICD-10-CM

## 2024-03-13 DIAGNOSIS — I10 PRIMARY HYPERTENSION: ICD-10-CM

## 2024-03-13 DIAGNOSIS — Z91.89 SEDENTARY LIFESTYLE: ICD-10-CM

## 2024-03-13 DIAGNOSIS — Z87.19 S/P DILATATION OF ESOPHAGEAL STRICTURE: ICD-10-CM

## 2024-03-13 DIAGNOSIS — I50.20 HEART FAILURE WITH REDUCED EJECTION FRACTION: Primary | ICD-10-CM

## 2024-03-13 DIAGNOSIS — I35.0 SEVERE AORTIC VALVE STENOSIS: ICD-10-CM

## 2024-03-13 DIAGNOSIS — D51.0 PERNICIOUS ANEMIA: ICD-10-CM

## 2024-03-13 DIAGNOSIS — I21.4 NSTEMI (NON-ST ELEVATED MYOCARDIAL INFARCTION): ICD-10-CM

## 2024-03-13 DIAGNOSIS — Z82.0 FAMILY HISTORY OF SLEEP APNEA: ICD-10-CM

## 2024-03-13 DIAGNOSIS — G47.19 EXCESSIVE DAYTIME SLEEPINESS: ICD-10-CM

## 2024-03-13 DIAGNOSIS — N18.31 STAGE 3A CHRONIC KIDNEY DISEASE: ICD-10-CM

## 2024-03-13 PROCEDURE — 93010 ELECTROCARDIOGRAM REPORT: CPT | Mod: S$PBB,,, | Performed by: INTERNAL MEDICINE

## 2024-03-13 PROCEDURE — 99205 OFFICE O/P NEW HI 60 MIN: CPT | Mod: 25,S$PBB,, | Performed by: INTERNAL MEDICINE

## 2024-03-13 PROCEDURE — 99999 PR PBB SHADOW E&M-EST. PATIENT-LVL IV: CPT | Mod: PBBFAC,,, | Performed by: INTERNAL MEDICINE

## 2024-03-13 PROCEDURE — 99214 OFFICE O/P EST MOD 30 MIN: CPT | Mod: PBBFAC | Performed by: INTERNAL MEDICINE

## 2024-03-13 PROCEDURE — 93005 ELECTROCARDIOGRAM TRACING: CPT | Mod: PBBFAC | Performed by: INTERNAL MEDICINE

## 2024-03-13 RX ORDER — CARVEDILOL 6.25 MG/1
6.25 TABLET ORAL 2 TIMES DAILY WITH MEALS
Qty: 60 TABLET | Refills: 11 | Status: SHIPPED | OUTPATIENT
Start: 2024-03-13 | End: 2024-04-12

## 2024-03-13 RX ORDER — SPIRONOLACTONE 25 MG/1
25 TABLET ORAL DAILY
Qty: 90 TABLET | Refills: 3 | Status: SHIPPED | OUTPATIENT
Start: 2024-03-13 | End: 2025-03-13

## 2024-03-13 RX ORDER — TELMISARTAN 80 MG/1
80 TABLET ORAL DAILY
COMMUNITY
End: 2024-03-13 | Stop reason: ALTCHOICE

## 2024-03-13 NOTE — PROGRESS NOTES
Subjective:    Patient ID:  Re Arciniega is a 89 y.o. female who presents for evaluation of No chief complaint on file.  Hospitalist and referred by Yg Talavera MD for acute on chronic HFrEF, HTN  PCP: Anthony Emanuel MD   Surgeon: Jonel Husain MD   Lives with 2 sons, Hossein is helpful,  is a outdoor smoker  Daughter, Georgia, here with patient, and Nahomy help with medications. Stefany help with meals.  DNR status but no Advanced directives.    Patient is a new patient to me.    SDOH: [place], some visual impairment, need glasses to read, transportation  Health literacy: medium   Activities: mostly sedentary post HF  Nicotine: never   Alcohol: none  Illicit drugs: none  Cardiac symptoms: weak all over  Home BP: 120s / low 70s, HR 70s  Medication compliance: yes, Nahomy sets  up  Diet: regular, no salt   Caffeine: none  Labs:   Lab Results   Component Value Date    TSH 2.702 01/27/2024        Lab Results   Component Value Date    HGBA1C 5.2 01/27/2024       Lab Results   Component Value Date    WBC 4.51 03/03/2024    HGB 11.5 (L) 03/03/2024    HCT 35.1 (L) 03/03/2024    MCV 96 03/03/2024     03/03/2024       CMP  Sodium   Date Value Ref Range Status   03/03/2024 138 136 - 145 mmol/L Final   03/02/2024 140 136 - 145 mmol/L Final   03/01/2024 141 136 - 145 mmol/L Final     Potassium   Date Value Ref Range Status   03/03/2024 4.7 3.5 - 5.1 mmol/L Final   03/02/2024 4.7 3.5 - 5.1 mmol/L Final   03/01/2024 4.5 3.5 - 5.1 mmol/L Final     Chloride   Date Value Ref Range Status   03/03/2024 98 95 - 110 mmol/L Final   03/02/2024 100 95 - 110 mmol/L Final   03/01/2024 105 95 - 110 mmol/L Final     CO2   Date Value Ref Range Status   03/03/2024 29 23 - 29 mmol/L Final   03/02/2024 28 23 - 29 mmol/L Final   03/01/2024 26 23 - 29 mmol/L Final     Glucose   Date Value Ref Range Status   03/03/2024 88 70 - 110 mg/dL Final   03/02/2024 77 70 - 110 mg/dL Final   03/01/2024 98 70 - 110 mg/dL Final     BUN   Date Value  Ref Range Status   03/03/2024 29 10 - 30 mg/dL Final   03/02/2024 25 10 - 30 mg/dL Final   03/01/2024 22 10 - 30 mg/dL Final     Creatinine   Date Value Ref Range Status   03/03/2024 1.0 0.5 - 1.4 mg/dL Final   03/02/2024 0.9 0.5 - 1.4 mg/dL Final   03/01/2024 0.9 0.5 - 1.4 mg/dL Final     Calcium   Date Value Ref Range Status   03/03/2024 9.3 8.7 - 10.5 mg/dL Final   03/02/2024 9.0 8.7 - 10.5 mg/dL Final   03/01/2024 9.1 8.7 - 10.5 mg/dL Final     Total Protein   Date Value Ref Range Status   03/03/2024 6.9 6.0 - 8.4 g/dL Final   03/02/2024 6.8 6.0 - 8.4 g/dL Final   03/01/2024 7.5 6.0 - 8.4 g/dL Final     Albumin   Date Value Ref Range Status   03/03/2024 3.4 (L) 3.5 - 5.2 g/dL Final   03/02/2024 3.5 3.5 - 5.2 g/dL Final   03/01/2024 3.9 3.5 - 5.2 g/dL Final     Total Bilirubin   Date Value Ref Range Status   03/03/2024 0.5 0.1 - 1.0 mg/dL Final     Comment:     For infants and newborns, interpretation of results should be based  on gestational age, weight and in agreement with clinical  observations.    Premature Infant recommended reference ranges:  Up to 24 hours.............<8.0 mg/dL  Up to 48 hours............<12.0 mg/dL  3-5 days..................<15.0 mg/dL  6-29 days.................<15.0 mg/dL     03/02/2024 0.7 0.1 - 1.0 mg/dL Final     Comment:     For infants and newborns, interpretation of results should be based  on gestational age, weight and in agreement with clinical  observations.    Premature Infant recommended reference ranges:  Up to 24 hours.............<8.0 mg/dL  Up to 48 hours............<12.0 mg/dL  3-5 days..................<15.0 mg/dL  6-29 days.................<15.0 mg/dL     03/01/2024 0.6 0.1 - 1.0 mg/dL Final     Comment:     For infants and newborns, interpretation of results should be based  on gestational age, weight and in agreement with clinical  observations.    Premature Infant recommended reference ranges:  Up to 24 hours.............<8.0 mg/dL  Up to 48  "hours............<12.0 mg/dL  3-5 days..................<15.0 mg/dL  6-29 days.................<15.0 mg/dL       Alkaline Phosphatase   Date Value Ref Range Status   03/03/2024 63 55 - 135 U/L Final   03/02/2024 61 55 - 135 U/L Final   03/01/2024 69 55 - 135 U/L Final     AST   Date Value Ref Range Status   03/03/2024 16 10 - 40 U/L Final   03/02/2024 18 10 - 40 U/L Final   03/01/2024 23 10 - 40 U/L Final     ALT   Date Value Ref Range Status   03/03/2024 7 (L) 10 - 44 U/L Final   03/02/2024 10 10 - 44 U/L Final   03/01/2024 12 10 - 44 U/L Final     Anion Gap   Date Value Ref Range Status   03/03/2024 11 8 - 16 mmol/L Final   03/02/2024 12 8 - 16 mmol/L Final   03/01/2024 10 8 - 16 mmol/L Final     eGFR   Date Value Ref Range Status   03/03/2024 52.5 (A) >60 mL/min/1.73 m^2 Final   03/02/2024 59.6 (A) >60 mL/min/1.73 m^2 Final   03/01/2024 59.6 (A) >60 mL/min/1.73 m^2 Final     @labrcntip(troponini)@    BNP   Date Value Ref Range Status   03/01/2024 982 (H) 0 - 99 pg/mL Final     Comment:     Values of less than 100 pg/ml are consistent with non-CHF populations.   }   Lab Results   Component Value Date    CHOL 193 01/27/2024     Lab Results   Component Value Date    HDL 81 (H) 01/27/2024     Lab Results   Component Value Date    LDLCALC 103.2 01/27/2024     Lab Results   Component Value Date    TRIG 44 01/27/2024     Lab Results   Component Value Date    CHOLHDL 42.0 01/27/2024       Last Echo: 1/2024  Last stress test: none  Cardiovascular angiogram: none  ECG: NSR, rate 72, LAE, left [place], LBBB  Fundoscopic exam: within the past year, negative for H    AAF referred for recurrent HFrEF not on optimum GDMT. Only noted generalized weakness post DC. Been quite sedentary since HF in 1/2024 with NSTEMI.     Yg Talavera MD noted on DCS 3/3/2024 "Patient is a 89-year-old female that has a past medical history of congestive heart failure hypertension hypothyroidism anemia and constipation patient presented last " "evening with a history of shortness of breath that has worsened over the past 2 days prior to presentation.  Patient states that she seemed to become worse over the last 1 day but it started approximately 2 days ago.  Patient was recently hospitalized for similar case with increased troponin on that visit.  Echocardiogram results are listed above.  Patient does have history of congestive heart failure and states that she was not on her Lasix at home.  Patient was discharged on the previous admission by me to [facility].  Patient now lives with her son at home.  Patient has difficulty with taking care of herself at home.  However she does have help from her son and daughter.  Patient states that she had 2 pillow orthopnea and states that she could not lay flat without becoming more shortness short of breath.    89-year-old female with a history of congestive heart failure.  Patient presented with increasing shortness of breath and found to be in mild congestive heart failure.  Diuresis was started with Lasix 40 mg IV twice daily.  Patient responded well and had no shortness a breath during this admission.  Patient also required no oxygen supplementation.  Continue same medications and ensured this patient was on a congestive heart failure regimen.  Patient will be sent home and care for by her daughter.  We ensure that this patient will be on Lasix 40 mg daily, potassium supplementation, and we will continue her other medications as prescribed.  Patient will follow up with her primary care physician within 1 week and her cardiologist soon after."    Echo 1/2024 -  Left Ventricle: The left ventricle is normal in size. Mildly increased wall thickness. Severe global hypokinesis present. There is severely reduced systolic function with a visually estimated ejection fraction of 20 - 25%. Ejection fraction by visual approximation is 20%. Global longitudinal strain is -7%. Reduced. There is diastolic dysfunction.  ·  " Right Ventricle: Normal right ventricular cavity size. Systolic function is severely reduced. TAPSE is 1.80 cm.  ·  Left Atrium: Left atrium is moderately dilated.  ·  Aortic Valve: The aortic valve is a trileaflet valve. Severely restricted motion. There is severe stenosis. Aortic valve area by VTI is 0.92 cm². Aortic valve peak velocity is 1.96 m/s. Mean gradient is 9 mmHg. The dimensionless index is 0.37. There is mild aortic regurgitation.  ·  Mitral Valve: There is moderate regurgitation.  ·  Tricuspid Valve: There is mild regurgitation.  ·  Pulmonic Valve: There is mild regurgitation.  ·  IVC/SVC: Normal venous pressure at 3 mmHg.  ·  Pericardium: There is a small effusion. No indication of cardiac tamponade.  ·  Significant deterioation of LV function from LVEF of 45% in Echo of 7/2023. There is also progression of LAE.    CXR - heart is prominent in size but unchanged.  Interstitial pulmonary edema appears similar to the previous study.  No focal consolidation is present.  Trace pleural effusions are not ruled out.       Review of Systems   Constitutional: Positive for weight loss. Negative for diaphoresis, fever, malaise/fatigue and night sweats.   HENT:  Positive for congestion and hearing loss. Negative for nosebleeds and tinnitus.    Eyes:  Positive for discharge. Negative for visual disturbance.   Cardiovascular:  Positive for leg swelling. Negative for chest pain, claudication, cyanosis, dyspnea on exertion, irregular heartbeat, near-syncope, orthopnea, palpitations and paroxysmal nocturnal dyspnea.   Respiratory:  Positive for shortness of breath and snoring. Negative for cough, sleep disturbances due to breathing and wheezing.         Vici score 9, awaken tired, daughter have sleep apnea   Endocrine: Negative for polydipsia and polyuria.   Hematologic/Lymphatic: Does not bruise/bleed easily.   Skin:  Negative for color change, flushing, nail changes, poor wound healing and suspicious lesions.    Musculoskeletal:  Negative for arthritis, falls, gout, joint pain, joint swelling, muscle cramps, muscle weakness and myalgias.   Gastrointestinal:  Positive for constipation. Negative for heartburn, hematemesis, hematochezia, melena and nausea.   Neurological:  Positive for tremors. Negative for disturbances in coordination, excessive daytime sleepiness, dizziness, focal weakness, headaches, light-headedness, loss of balance, numbness, vertigo and weakness.   Psychiatric/Behavioral:  Positive for depression. Negative for substance abuse. The patient is nervous/anxious. The patient does not have insomnia.         Objective:    Physical Exam  Constitutional:       Appearance: She is well-developed.      Comments: RA O2 sat 98%,   Orthostatic VS: sitting 131/60, standing 126/58   HENT:      Head: Normocephalic.   Eyes:      Conjunctiva/sclera: Conjunctivae normal.      Pupils: Pupils are equal, round, and reactive to light.   Neck:      Thyroid: No thyromegaly.      Vascular: No JVD.   Cardiovascular:      Rate and Rhythm: Normal rate and regular rhythm.      Pulses: Intact distal pulses.           Carotid pulses are 1+ on the right side with bruit and 1+ on the left side with bruit.       Radial pulses are 2+ on the right side and 1+ on the left side.         Right dorsalis pedis pulse not accessible and left dorsalis pedis pulse not accessible.         Right posterior tibial pulse not accessible and left posterior tibial pulse not accessible.      Heart sounds: Murmur heard.      Medium-pitched harsh mid to late systolic murmur is present with a grade of 3/6 at the upper right sternal border radiating to the neck.      No friction rub. No gallop.   Pulmonary:      Effort: Pulmonary effort is normal.      Breath sounds: No rales.      Comments: Diminished breath sounds  Chest:      Chest wall: No tenderness.   Abdominal:      General: Bowel sounds are normal.      Palpations: Abdomen is soft.      Tenderness: There  is no abdominal tenderness.   Musculoskeletal:         General: Normal range of motion.      Cervical back: Normal range of motion and neck supple.      Right lower leg: Edema (2-3+ pitting aroung the ankle) present.      Left lower leg: Edema (1 to 2+ pitting around the ankle.) present.   Lymphadenopathy:      Cervical: No cervical adenopathy.   Skin:     General: Skin is warm and dry.      Findings: Erythema (stasis dermartitis, right leg > left) present. No rash.   Neurological:      Mental Status: She is alert and oriented to person, place, and time.      Comments: Tremors, left > right           Assessment:       1. Heart failure with reduced ejection fraction    2. Congestive heart failure, unspecified HF chronicity, unspecified heart failure type    3. NSTEMI (non-ST elevated myocardial infarction)    4. Primary hypertension    5. History of asthma    6. S/P dilatation of esophageal stricture    7. Sedentary lifestyle, since HF 1/2024    8. Pernicious anemia    9. Stage 3a chronic kidney disease    10. LBBB (left bundle branch block)    11. Excessive daytime sleepiness    12. Family history of sleep apnea    13. Severe aortic valve stenosis         Plan:       Diagnoses and all orders for this visit:    Heart failure with reduced ejection fraction  -     IN OFFICE EKG 12-LEAD (to Muse)  -     spironolactone (ALDACTONE) 25 MG tablet; Take 1 tablet (25 mg total) by mouth once daily.  -     Basic Metabolic Panel; Future  -     BNP; Future  -     carvediloL (COREG) 6.25 MG tablet; Take 1 tablet (6.25 mg total) by mouth 2 (two) times daily with meals.    Congestive heart failure, unspecified HF chronicity, unspecified heart failure type  -     ACCEPT - Ambulatory referral/consult to Cardiology    NSTEMI (non-ST elevated myocardial infarction)  -     IN OFFICE EKG 12-LEAD (to Muse)    Primary hypertension    History of asthma    S/P dilatation of esophageal stricture    Sedentary lifestyle, since HF  1/2024    Pernicious anemia    Stage 3a chronic kidney disease    LBBB (left bundle branch block)    Excessive daytime sleepiness    Family history of sleep apnea    Severe aortic valve stenosis     - All medical issues reviewed, need to proceed with GDMT titration, will add MRA with spironolactone. Is a candidate for cardiac rehab and consideration for TAVR  - Warning signs of MI and stroke given, if symptoms last more than 5 minutes, stop immediately and call 911, then chew 2-4 low-dose ASA (81 mg).  - Info on HF, AS, TAMMY, TAVR  - CV status and all medications reviewed, patient acknowledge good understanding.  - Recommend healthy living: healthy diet and regular exercise aiming for fitness, restorative sleep and weight control  - Need good exercise program, 4 key elements: 1. Aerobic (walking, swimming, dancing, jogging, biking, etc, 2. Muscle strengthening / resistance exercise, need to do 2-3 times weekly, 3. Stretching daily, good stretch takes a whole  total minute. 4. Balance exercise daily.   - Encourage activities as much as tolerated. Any activity is better than none!   - Instruction for Mediterranean diet and heart healthy exercise given.  - Highly recommend 30-60 minutes of exercise / activities daily, can have Sunday off, with 2-3 sessions of muscle strengthening weekly. A  would be very helpful.  - Will follow up in 4 weeks to check efficacy with medication titration  - Need to have family member accompany patient at each doctor visit.     Total time spend including review of record prior to face-to-face visit is 60 minutes. Greater than 50% of the time was spent in counseling and coordination of care. The above assessment and plan have been discussed at length. Referring provider's note reviewed. Labs and procedure over the last 6 months reviewed. Problem List updated. Asked to bring in all active medications / pills bottles with next visit. Will send note to referring / PCP.

## 2024-03-13 NOTE — PATIENT INSTRUCTIONS
Recommended Mediterranean dietEating Heart-Healthy Food: Using the DASH Plan  Eating for your heart doesnt have to be hard or boring. You just need to know how to make healthier choices. The DASH eating plan has been developed to help you do just that. DASH stands for Dietary Approaches to Stop Hypertension. It is a plan that has been proven to be healthier for your heart and to lower your risk for high blood pressure. It can also help lower your risk for cancer, heart disease, osteoporosis, and diabetes.  Choosing from Each Food Group  Choose foods from each of the food groups below each day. Try to get the recommended number of servings for each food group. The serving numbers are based on a diet of 2,000 calories a day. Talk to your doctor if youre unsure about your calorie needs.  Grains   Servings: 7-8 a day  A serving is:  1 slice bread  1 ounce dry cereal  half a cup cooked rice or pasta  Best choices: Whole grains and any grains high in fiber.  Vegetables   Servings: 4-5 a day  A serving is:  1 cup raw leafy vegetable  Half a cup cooked vegetable  Three-quarter cup vegetable juice  Best choices: Fresh or frozen vegetable prepared without too much added salt or fat.    Fruits   Servings: 4-5 a day  A serving is:  Three-quarter cup fruit juice  1 medium fruit  One-quarter cup dried fruit  One-half cup fresh, frozen, or canned fruit  Best choices: A variety of fresh fruits of different colors. Whole fruits are a much better choice than fruit juices.  Low-fat or Fat Free Dairy   Servings: 2-3 a day  A serving is:  8 ounces milk  1 cup yogurt  One and a half ounces cheese  Best choices: Skim or 1% milk, low-fat or fat free yogurt or buttermilk, and low-fat cheeses.       Meat, Poultry, Fish   Servings: 2 or fewer a day  A serving is:  3 ounces cooked meat, poultry, or fish  Best choices: Lean meats and fish. Trim away visible fat. Broil, roast, or boil instead of frying. Remove skin from poultry before eating.   Nuts, Seeds, Beans   Servings: 4-5 a week  A serving is:  One third cup nuts (or one and a half ounces)  2 tablespoons sunflower seeds  Half a cup cooked beans  Best choices: Dry roasted nuts with no salt added, lentils, kidney beans, garbanzo beans, and whole hernandez beans.    Fats and Oils   Servings: 2 a day  A serving is:  1 teaspoon vegetable oil  1 teaspoon soft margarine  1 tablespoon low-fat mayonnaise  1 teaspoon regular mayonnaise  2 tablespoons light salad dressing  1 tablespoon regular salad dressing  Best choices: Monounsaturated and polyunsaturated fats such as olive, canola, or safflower oil.  Sweets   Servings: 5 a week or fewer  A serving is:  1 tablespoon sugar, maple syrup, or honey  1 tablespoon jam or jelly  1 half-ounce jelly beans (about 15)  8 ounces lemonade  Best choices: Dried fruit can be a satisfying sweet. Choose low-fat sweets when possible. And watch your serving sizes!       Aerobic Exercise for a Healthy Heart  Exercise is a lot more than an energy booster and a stress reliever. It also strengthens your heart muscle, lowers your blood pressure and blood cholesterol, and burns calories.      Remember, some activity is better than none.     Choose an Aerobic Activity  Choose a nonstop activity that makes your heart and lungs work harder than they do when you rest or walk normally. This aerobic exercise can improve the way your heart and other muscles use oxygen. Make it fun by exercising with a friend and choosing an activity you enjoy. Here are some ideas:  Walking  Swimming  Bicycling  Stair climbing  Dancing  Jogging  Exercise Regularly  If you havent been exercising regularly,  get your doctors okay first. Then start slowly.  Here are some tips:  Begin exercising 3 times a week for 5-10 minutes at a time.  When you feel comfortable, add a few minutes each week.  Slowly build up to exercising 3-4 times each week for 20-40 minutes. Aim for a total of 150 or more minutes a  week.  Be sure to carry your nitroglycerin with you when you exercise.  If you get angina when youre exercising, stop what youre doing, take your nitroglycerin, and call your doctor.  © 1150-5520 [employer], [address] [place] [address], [place] [address]. All rights reserved. This information is not intended as a substitute for professional medical care. Always follow your healthcare professional's instructions.

## 2024-03-14 LAB
OHS QRS DURATION: 132 MS
OHS QTC CALCULATION: 488 MS

## 2024-04-11 ENCOUNTER — OFFICE VISIT (OUTPATIENT)
Dept: PODIATRY | Facility: CLINIC | Age: 89
End: 2024-04-11
Payer: MEDICARE

## 2024-04-11 VITALS
WEIGHT: 118 LBS | BODY MASS INDEX: 18.96 KG/M2 | HEIGHT: 66 IN | HEART RATE: 79 BPM | DIASTOLIC BLOOD PRESSURE: 69 MMHG | SYSTOLIC BLOOD PRESSURE: 132 MMHG

## 2024-04-11 DIAGNOSIS — L84 PRE-ULCERATIVE CALLUSES: ICD-10-CM

## 2024-04-11 DIAGNOSIS — M20.41 HAMMER TOES OF BOTH FEET: ICD-10-CM

## 2024-04-11 DIAGNOSIS — B35.1 ONYCHOMYCOSIS OF TOENAIL: ICD-10-CM

## 2024-04-11 DIAGNOSIS — M20.42 HAMMER TOES OF BOTH FEET: ICD-10-CM

## 2024-04-11 DIAGNOSIS — M20.11 HALLUX ABDUCTO VALGUS, BILATERAL: Primary | ICD-10-CM

## 2024-04-11 DIAGNOSIS — M20.12 HALLUX ABDUCTO VALGUS, BILATERAL: Primary | ICD-10-CM

## 2024-04-11 PROCEDURE — 99999 PR PBB SHADOW E&M-EST. PATIENT-LVL IV: CPT | Mod: PBBFAC,,, | Performed by: PODIATRIST

## 2024-04-11 PROCEDURE — 99213 OFFICE O/P EST LOW 20 MIN: CPT | Mod: S$PBB,,, | Performed by: PODIATRIST

## 2024-04-11 PROCEDURE — 99214 OFFICE O/P EST MOD 30 MIN: CPT | Mod: PBBFAC | Performed by: PODIATRIST

## 2024-04-11 RX ORDER — HYDRALAZINE HYDROCHLORIDE 10 MG/1
10 TABLET, FILM COATED ORAL 2 TIMES DAILY
COMMUNITY
Start: 2024-02-02 | End: 2024-04-12 | Stop reason: ALTCHOICE

## 2024-04-11 RX ORDER — ASPIRIN 81 MG/1
81 TABLET ORAL EVERY MORNING
COMMUNITY
Start: 2024-02-28

## 2024-04-11 RX ORDER — POTASSIUM CHLORIDE 750 MG/1
10 TABLET, EXTENDED RELEASE ORAL EVERY MORNING
COMMUNITY
Start: 2024-02-28 | End: 2024-04-12

## 2024-04-11 RX ORDER — DOCUSATE SODIUM AND SENNOSIDES 50; 8.6 MG/1; MG/1
TABLET ORAL
COMMUNITY
Start: 2024-02-15

## 2024-04-11 RX ORDER — FUROSEMIDE 20 MG/1
20 TABLET ORAL EVERY MORNING
COMMUNITY
Start: 2024-02-28 | End: 2024-04-12 | Stop reason: DRUGHIGH

## 2024-04-11 RX ORDER — LANOLIN ALCOHOL/MO/W.PET/CERES
1 CREAM (GRAM) TOPICAL EVERY MORNING
COMMUNITY
Start: 2024-02-28 | End: 2024-04-12 | Stop reason: SDUPTHER

## 2024-04-12 ENCOUNTER — OFFICE VISIT (OUTPATIENT)
Dept: CARDIOLOGY | Facility: CLINIC | Age: 89
End: 2024-04-12
Payer: MEDICARE

## 2024-04-12 ENCOUNTER — LAB VISIT (OUTPATIENT)
Dept: LAB | Facility: HOSPITAL | Age: 89
End: 2024-04-12
Attending: INTERNAL MEDICINE
Payer: MEDICARE

## 2024-04-12 VITALS
BODY MASS INDEX: 18.72 KG/M2 | HEART RATE: 69 BPM | SYSTOLIC BLOOD PRESSURE: 142 MMHG | OXYGEN SATURATION: 98 % | WEIGHT: 116 LBS | DIASTOLIC BLOOD PRESSURE: 66 MMHG

## 2024-04-12 DIAGNOSIS — N18.31 STAGE 3A CHRONIC KIDNEY DISEASE: ICD-10-CM

## 2024-04-12 DIAGNOSIS — I50.20 HEART FAILURE WITH REDUCED EJECTION FRACTION: Primary | ICD-10-CM

## 2024-04-12 DIAGNOSIS — I35.0 SEVERE AORTIC VALVE STENOSIS: ICD-10-CM

## 2024-04-12 DIAGNOSIS — I10 PRIMARY HYPERTENSION: ICD-10-CM

## 2024-04-12 DIAGNOSIS — I50.20 HEART FAILURE WITH REDUCED EJECTION FRACTION: ICD-10-CM

## 2024-04-12 DIAGNOSIS — Z91.89 SEDENTARY LIFESTYLE: ICD-10-CM

## 2024-04-12 DIAGNOSIS — Z82.0 FAMILY HISTORY OF SLEEP APNEA: ICD-10-CM

## 2024-04-12 DIAGNOSIS — G47.19 EXCESSIVE DAYTIME SLEEPINESS: ICD-10-CM

## 2024-04-12 DIAGNOSIS — I44.7 LBBB (LEFT BUNDLE BRANCH BLOCK): ICD-10-CM

## 2024-04-12 DIAGNOSIS — R79.89 PRERENAL AZOTEMIA: ICD-10-CM

## 2024-04-12 LAB
ANION GAP SERPL CALC-SCNC: 11 MMOL/L (ref 8–16)
BNP SERPL-MCNC: 761 PG/ML (ref 0–99)
BUN SERPL-MCNC: 37 MG/DL (ref 10–30)
CALCIUM SERPL-MCNC: 9.4 MG/DL (ref 8.7–10.5)
CHLORIDE SERPL-SCNC: 108 MMOL/L (ref 95–110)
CO2 SERPL-SCNC: 27 MMOL/L (ref 23–29)
CREAT SERPL-MCNC: 1 MG/DL (ref 0.5–1.4)
EST. GFR  (NO RACE VARIABLE): 52.5 ML/MIN/1.73 M^2
GLUCOSE SERPL-MCNC: 82 MG/DL (ref 70–110)
POTASSIUM SERPL-SCNC: 4.3 MMOL/L (ref 3.5–5.1)
SODIUM SERPL-SCNC: 146 MMOL/L (ref 136–145)

## 2024-04-12 PROCEDURE — 99214 OFFICE O/P EST MOD 30 MIN: CPT | Mod: S$PBB,,, | Performed by: INTERNAL MEDICINE

## 2024-04-12 PROCEDURE — 99213 OFFICE O/P EST LOW 20 MIN: CPT | Mod: PBBFAC | Performed by: INTERNAL MEDICINE

## 2024-04-12 PROCEDURE — 36415 COLL VENOUS BLD VENIPUNCTURE: CPT | Performed by: INTERNAL MEDICINE

## 2024-04-12 PROCEDURE — 83880 ASSAY OF NATRIURETIC PEPTIDE: CPT | Performed by: INTERNAL MEDICINE

## 2024-04-12 PROCEDURE — 99999 PR PBB SHADOW E&M-EST. PATIENT-LVL III: CPT | Mod: PBBFAC,,, | Performed by: INTERNAL MEDICINE

## 2024-04-12 PROCEDURE — 80048 BASIC METABOLIC PNL TOTAL CA: CPT | Performed by: INTERNAL MEDICINE

## 2024-04-12 RX ORDER — CARVEDILOL 12.5 MG/1
12.5 TABLET ORAL 2 TIMES DAILY WITH MEALS
Qty: 60 TABLET | Refills: 11 | Status: SHIPPED | OUTPATIENT
Start: 2024-04-12 | End: 2025-04-12

## 2024-04-12 RX ORDER — LOSARTAN POTASSIUM 50 MG/1
50 TABLET ORAL NIGHTLY
Qty: 90 TABLET | Refills: 3 | Status: SHIPPED | OUTPATIENT
Start: 2024-04-12 | End: 2025-04-12

## 2024-04-12 NOTE — PROGRESS NOTES
Subjective:    Patient ID:  Re Arciniega is a 89 y.o. female who presents for evaluation of No chief complaint on file.  Hospitalist and referred by Yg Talavera MD for acute on chronic HFrEF, HTN, 4-weeks review  PCP: Anthony Emanuel MD   Surgeon: Jonel Husain MD   Lives with 2 sons, Hossein is helpful,  is a outdoor smoker  Daughter, Georgia, here with patient, and Nahomy help with medications. Stefany help with meals.  DNR status but no Advanced directives.    SDOH: [place], some visual impairment, need glasses to read, transportation  Health literacy: medium   Activities: mostly sedentary post HF  Nicotine: never   Alcohol: none  Illicit drugs: none  Cardiac symptoms: weak all over, no energy  Home BP: no recent check  Medication compliance: yes, Nahomy sets  up  Diet: regular, no salt   Caffeine: none  Labs:   Lab Results   Component Value Date    TSH 2.702 01/27/2024        Lab Results   Component Value Date    HGBA1C 5.2 01/27/2024       Lab Results   Component Value Date    WBC 4.51 03/03/2024    HGB 11.5 (L) 03/03/2024    HCT 35.1 (L) 03/03/2024    MCV 96 03/03/2024     03/03/2024       CMP  Sodium   Date Value Ref Range Status   04/12/2024 146 (H) 136 - 145 mmol/L Final   03/03/2024 138 136 - 145 mmol/L Final   03/02/2024 140 136 - 145 mmol/L Final     Potassium   Date Value Ref Range Status   04/12/2024 4.3 3.5 - 5.1 mmol/L Final   03/03/2024 4.7 3.5 - 5.1 mmol/L Final   03/02/2024 4.7 3.5 - 5.1 mmol/L Final     Chloride   Date Value Ref Range Status   04/12/2024 108 95 - 110 mmol/L Final   03/03/2024 98 95 - 110 mmol/L Final   03/02/2024 100 95 - 110 mmol/L Final     CO2   Date Value Ref Range Status   04/12/2024 27 23 - 29 mmol/L Final   03/03/2024 29 23 - 29 mmol/L Final   03/02/2024 28 23 - 29 mmol/L Final     Glucose   Date Value Ref Range Status   04/12/2024 82 70 - 110 mg/dL Final   03/03/2024 88 70 - 110 mg/dL Final   03/02/2024 77 70 - 110 mg/dL Final     BUN   Date Value Ref Range  Status   04/12/2024 37 (H) 10 - 30 mg/dL Final   03/03/2024 29 10 - 30 mg/dL Final   03/02/2024 25 10 - 30 mg/dL Final     Creatinine   Date Value Ref Range Status   04/12/2024 1.0 0.5 - 1.4 mg/dL Final   03/03/2024 1.0 0.5 - 1.4 mg/dL Final   03/02/2024 0.9 0.5 - 1.4 mg/dL Final     Calcium   Date Value Ref Range Status   04/12/2024 9.4 8.7 - 10.5 mg/dL Final   03/03/2024 9.3 8.7 - 10.5 mg/dL Final   03/02/2024 9.0 8.7 - 10.5 mg/dL Final     Total Protein   Date Value Ref Range Status   03/03/2024 6.9 6.0 - 8.4 g/dL Final   03/02/2024 6.8 6.0 - 8.4 g/dL Final   03/01/2024 7.5 6.0 - 8.4 g/dL Final     Albumin   Date Value Ref Range Status   03/03/2024 3.4 (L) 3.5 - 5.2 g/dL Final   03/02/2024 3.5 3.5 - 5.2 g/dL Final   03/01/2024 3.9 3.5 - 5.2 g/dL Final     Total Bilirubin   Date Value Ref Range Status   03/03/2024 0.5 0.1 - 1.0 mg/dL Final     Comment:     For infants and newborns, interpretation of results should be based  on gestational age, weight and in agreement with clinical  observations.    Premature Infant recommended reference ranges:  Up to 24 hours.............<8.0 mg/dL  Up to 48 hours............<12.0 mg/dL  3-5 days..................<15.0 mg/dL  6-29 days.................<15.0 mg/dL     03/02/2024 0.7 0.1 - 1.0 mg/dL Final     Comment:     For infants and newborns, interpretation of results should be based  on gestational age, weight and in agreement with clinical  observations.    Premature Infant recommended reference ranges:  Up to 24 hours.............<8.0 mg/dL  Up to 48 hours............<12.0 mg/dL  3-5 days..................<15.0 mg/dL  6-29 days.................<15.0 mg/dL     03/01/2024 0.6 0.1 - 1.0 mg/dL Final     Comment:     For infants and newborns, interpretation of results should be based  on gestational age, weight and in agreement with clinical  observations.    Premature Infant recommended reference ranges:  Up to 24 hours.............<8.0 mg/dL  Up to 48 hours............<12.0  "mg/dL  3-5 days..................<15.0 mg/dL  6-29 days.................<15.0 mg/dL       Alkaline Phosphatase   Date Value Ref Range Status   03/03/2024 63 55 - 135 U/L Final   03/02/2024 61 55 - 135 U/L Final   03/01/2024 69 55 - 135 U/L Final     AST   Date Value Ref Range Status   03/03/2024 16 10 - 40 U/L Final   03/02/2024 18 10 - 40 U/L Final   03/01/2024 23 10 - 40 U/L Final     ALT   Date Value Ref Range Status   03/03/2024 7 (L) 10 - 44 U/L Final   03/02/2024 10 10 - 44 U/L Final   03/01/2024 12 10 - 44 U/L Final     Anion Gap   Date Value Ref Range Status   04/12/2024 11 8 - 16 mmol/L Final   03/03/2024 11 8 - 16 mmol/L Final   03/02/2024 12 8 - 16 mmol/L Final     eGFR   Date Value Ref Range Status   04/12/2024 52.5 (A) >60 mL/min/1.73 m^2 Final   03/03/2024 52.5 (A) >60 mL/min/1.73 m^2 Final   03/02/2024 59.6 (A) >60 mL/min/1.73 m^2 Final     @labrcntip(troponini)@    BNP   Date Value Ref Range Status   04/12/2024 761 (H) 0 - 99 pg/mL Final     Comment:     Values of less than 100 pg/ml are consistent with non-CHF populations.   }   Lab Results   Component Value Date    CHOL 193 01/27/2024     Lab Results   Component Value Date    HDL 81 (H) 01/27/2024     Lab Results   Component Value Date    LDLCALC 103.2 01/27/2024     Lab Results   Component Value Date    TRIG 44 01/27/2024     Lab Results   Component Value Date    CHOLHDL 42.0 01/27/2024       Last Echo: 1/2024  Last stress test: none  Cardiovascular angiogram: none  ECG: NSR, rate 71, LAE, left [place], LBBB  Fundoscopic exam: within the past year, negative for H    In 3/2024:  AAF referred for recurrent HFrEF not on optimum GDMT. Only noted generalized weakness post DC. Been quite sedentary since HF in 1/2024 with NSTEMI.     Yg Talavera MD noted on DCS 3/3/2024 "Patient is a 89-year-old female that has a past medical history of congestive heart failure hypertension hypothyroidism anemia and constipation patient presented last evening with a " "history of shortness of breath that has worsened over the past 2 days prior to presentation.  Patient states that she seemed to become worse over the last 1 day but it started approximately 2 days ago.  Patient was recently hospitalized for similar case with increased troponin on that visit.  Echocardiogram results are listed above.  Patient does have history of congestive heart failure and states that she was not on her Lasix at home.  Patient was discharged on the previous admission by me to [facility].  Patient now lives with her son at home.  Patient has difficulty with taking care of herself at home.  However she does have help from her son and daughter.  Patient states that she had 2 pillow orthopnea and states that she could not lay flat without becoming more shortness short of breath.    89-year-old female with a history of congestive heart failure.  Patient presented with increasing shortness of breath and found to be in mild congestive heart failure.  Diuresis was started with Lasix 40 mg IV twice daily.  Patient responded well and had no shortness a breath during this admission.  Patient also required no oxygen supplementation.  Continue same medications and ensured this patient was on a congestive heart failure regimen.  Patient will be sent home and care for by her daughter.  We ensure that this patient will be on Lasix 40 mg daily, potassium supplementation, and we will continue her other medications as prescribed.  Patient will follow up with her primary care physician within 1 week and her cardiologist soon after."    Echo 1/2024 -  Left Ventricle: The left ventricle is normal in size. Mildly increased wall thickness. Severe global hypokinesis present. There is severely reduced systolic function with a visually estimated ejection fraction of 20 - 25%. Ejection fraction by visual approximation is 20%. Global longitudinal strain is -7%. Reduced. There is diastolic dysfunction.  ·  Right Ventricle: " Normal right ventricular cavity size. Systolic function is severely reduced. TAPSE is 1.80 cm.  ·  Left Atrium: Left atrium is moderately dilated.  ·  Aortic Valve: The aortic valve is a trileaflet valve. Severely restricted motion. There is severe stenosis. Aortic valve area by VTI is 0.92 cm². Aortic valve peak velocity is 1.96 m/s. Mean gradient is 9 mmHg. The dimensionless index is 0.37. There is mild aortic regurgitation.  ·  Mitral Valve: There is moderate regurgitation.  ·  Tricuspid Valve: There is mild regurgitation.  ·  Pulmonic Valve: There is mild regurgitation.  ·  IVC/SVC: Normal venous pressure at 3 mmHg.  ·  Pericardium: There is a small effusion. No indication of cardiac tamponade.  ·  Significant deterioation of LV function from LVEF of 45% in Echo of 7/2023. There is also progression of LAE.    CXR - heart is prominent in size but unchanged.  Interstitial pulmonary edema appears similar to the previous study.  No focal consolidation is present.  Trace pleural effusions are not ruled out.       HPI comments: in 4/2024, return for 4-weeks follow up. Little change remain sedentary with easy fatigue. No problem with medication. No low BP reported.     Review of Systems   Constitutional: Positive for malaise/fatigue. Negative for chills, decreased appetite, diaphoresis, fever, night sweats, weight gain and weight loss.        Weight stable from 3/2024   HENT:  Positive for congestion and hearing loss. Negative for hoarse voice, nosebleeds, sore throat and tinnitus.    Eyes:  Positive for discharge. Negative for double vision, pain and visual disturbance.   Cardiovascular:  Positive for leg swelling. Negative for chest pain, claudication, cyanosis, dyspnea on exertion, irregular heartbeat, near-syncope, orthopnea, palpitations and paroxysmal nocturnal dyspnea.   Respiratory:  Positive for snoring. Negative for cough, sleep disturbances due to breathing, sputum production and wheezing.         Aubrey  score 9 today 6, awaken tired, daughter have sleep apnea, decline sleep study   Endocrine: Negative for cold intolerance, heat intolerance, polydipsia and polyuria.   Hematologic/Lymphatic: Negative for bleeding problem. Does not bruise/bleed easily.   Skin:  Negative for color change, flushing, nail changes, poor wound healing and suspicious lesions.   Musculoskeletal:  Positive for joint pain. Negative for arthritis, falls, gout, joint swelling, muscle cramps, muscle weakness, myalgias and neck pain.   Gastrointestinal:  Positive for constipation. Negative for change in bowel habit, diarrhea, dysphagia, heartburn, hematemesis, hematochezia, jaundice, melena and nausea.   Genitourinary:  Negative for bladder incontinence, dysuria, frequency, hematuria and hesitancy.   Neurological:  Positive for tremors and weakness. Negative for disturbances in coordination, excessive daytime sleepiness, dizziness, focal weakness, headaches, light-headedness, loss of balance, numbness, paresthesias, seizures and vertigo.   Psychiatric/Behavioral:  Positive for depression. Negative for memory loss and substance abuse. The patient is nervous/anxious. The patient does not have insomnia.      Answers submitted by the patient for this visit:  Review of Symptoms (Submitted on 4/12/2024)  Sweats?: No  chest tightness: No  Difficulty breathing when lying down?: No  syncope: No     Objective:    Physical Exam  Constitutional:       Appearance: She is well-developed.      Comments: RA O2 sat 98%,   Orthostatic VS: sitting 142/66, standing 146/80   HENT:      Head: Normocephalic.   Eyes:      Conjunctiva/sclera: Conjunctivae normal.      Pupils: Pupils are equal, round, and reactive to light.   Neck:      Thyroid: No thyromegaly.      Vascular: No JVD.   Cardiovascular:      Rate and Rhythm: Normal rate and regular rhythm.      Pulses: Intact distal pulses.           Carotid pulses are 1+ on the right side with bruit and 1+ on the left side  with bruit.       Radial pulses are 2+ on the right side and 1+ on the left side.         Right dorsalis pedis pulse not accessible and left dorsalis pedis pulse not accessible.         Right posterior tibial pulse not accessible and left posterior tibial pulse not accessible.      Heart sounds: Murmur heard.      Medium-pitched harsh mid to late systolic murmur is present with a grade of 3/6 at the upper right sternal border radiating to the neck.      No friction rub. No gallop.   Pulmonary:      Effort: Pulmonary effort is normal.      Breath sounds: No rales.      Comments: Diminished breath sounds  Chest:      Chest wall: No tenderness.   Abdominal:      General: Bowel sounds are normal.      Palpations: Abdomen is soft.      Tenderness: There is no abdominal tenderness.   Musculoskeletal:         General: Normal range of motion.      Cervical back: Normal range of motion and neck supple.      Right lower leg: Edema (2+ pitting aroung the ankle) present.      Left lower leg: Edema (2+ pitting around the ankle) present.   Lymphadenopathy:      Cervical: No cervical adenopathy.   Skin:     General: Skin is warm and dry.      Findings: Erythema (stasis dermartitis, right leg > left) present. No rash.   Neurological:      Mental Status: She is alert and oriented to person, place, and time.      Comments: Tremors, left > right           Assessment:       1. Heart failure with reduced ejection fraction    2. Stage 3a chronic kidney disease    3. Family history of sleep apnea    4. Primary hypertension    5. Excessive daytime sleepiness    6. Severe aortic valve stenosis    7. LBBB (left bundle branch block)    8. Sedentary lifestyle, since HF 1/2024    9. Prerenal azotemia           Plan:       Diagnoses and all orders for this visit:    Heart failure with reduced ejection fraction  -     carvediloL (COREG) 12.5 MG tablet; Take 1 tablet (12.5 mg total) by mouth 2 (two) times daily with meals.  -     losartan (COZAAR)  50 MG tablet; Take 1 tablet (50 mg total) by mouth every evening.  -     Basic Metabolic Panel; Future    Stage 3a chronic kidney disease  -     Basic Metabolic Panel; Future    Family history of sleep apnea    Primary hypertension  -     losartan (COZAAR) 50 MG tablet; Take 1 tablet (50 mg total) by mouth every evening.    Excessive daytime sleepiness    Severe aortic valve stenosis    LBBB (left bundle branch block)    Sedentary lifestyle, since HF 1/2024    Prerenal azotemia  -     Basic Metabolic Panel; Future    - All medical issues reviewed, will up-titrate GDMT titration, Is a candidate for cardiac rehab, declined, Repeat BMP in 2 weeks.  - Warning signs of MI and stroke given, if symptoms last more than 5 minutes, stop immediately and call 911, then chew 2-4 low-dose ASA (81 mg).  - Info on HF, AS, TAMMY, TAVR  - CV status and all medications reviewed, patient acknowledge good understanding.  - Recommend healthy living: healthy diet and regular exercise aiming for fitness, restorative sleep and weight control  - Need good exercise program, 4 key elements: 1. Aerobic (walking, swimming, dancing, jogging, biking, etc, 2. Muscle strengthening / resistance exercise, need to do 2-3 times weekly, 3. Stretching daily, good stretch takes a whole  total minute. 4. Balance exercise daily.   - Encourage activities as much as tolerated. Any activity is better than none!   - Can start with up and down from chair 10 times, 3 times daily and work up to 30 times, 3 times daily over the  next 2 weeks.   - Instruction for Mediterranean diet and heart healthy exercise given.  - Highly recommend 30-60 minutes of exercise / activities daily, can have Sunday off, with 2-3 sessions of muscle strengthening weekly. A  would be very helpful.  - Will follow up in 3 months to check efficacy with medication titration, family's preference.  - Need to have family member accompany patient at each doctor visit.     Total time  spend including review of record prior to face-to-face visit is 30 minutes. Greater than 50% of the time was spent in counseling and coordination of care. The above assessment and plan have been discussed at length. Referring provider's note reviewed. Labs and procedure over the last 6 months reviewed. Problem List updated. Asked to bring in all active medications / pills bottles with next visit. Will send note to referring / PCP.

## 2024-04-12 NOTE — LETTER
April 12, 2024      Anthony Emanuel MD  [address]  [facility] [place] [address]           [facility] - Cardiology  [address]  [address]  [facility] [place] [address]  Phone: [phone]  Fax: [phone]          Patient: Re Arciniega   MR Number: [id number]   YOB: 1930   Date of Visit: 4/12/2024       Dear No ref. provider found:    Thank you for referring Re Arciniega to me for evaluation. Attached you will find relevant portions of my assessment and plan of care.    If you have questions, please do not hesitate to call me. I look forward to following Re Arciniega along with you.    Sincerely,    Hakan Bradley MD    Enclosure  CC:    No Recipients    If you would like to receive this communication electronically, please contact [email or web address] or [phone] to request more information on [employer] Link access.    For providers and/or their staff who would like to refer a patient to [facility], please contact us through our one-stop-shop provider referral line, [facility], at [phone].    If you feel you have received this communication in error or would no longer like to receive these types of communications, please e-mail [email or web address]

## 2024-04-14 NOTE — PROGRESS NOTES
Subjective:       Patient ID: Re Arciniega is a 89 y.o. female.    Chief Complaint: Follow-up, Callouses, and Hammer Toe  Patient presents with her daughter for follow-up ulcers 2nd digit L>R.  Patient relates these areas have resolved.  Reports since last visit she was hospitalized for CHF for a few weeks, during this time as they were treating fluid around her heart swelling in her legs pretty much resolved and then she went into rehab for a few weeks, toes and legs are doing much better.  States she is still applying clobetasol daily to these areas on the 2nd toes which have not developed any further callus.  She is applying it to the calluses on the back of the heels      Past Medical History:   Diagnosis Date    Anemia     CHF (congestive heart failure)     Constipation     Hypertension     Thyroid disease      Past Surgical History:   Procedure Laterality Date    CHOLECYSTECTOMY      HYSTERECTOMY       No family history on file.  Social History     Socioeconomic History    Marital status:    Tobacco Use    Smoking status: Never    Smokeless tobacco: Never   Substance and Sexual Activity    Alcohol use: No    Drug use: No    Sexual activity: Never     Social Determinants of Health     Financial Resource Strain: Low Risk  (4/12/2024)    Overall Financial Resource Strain (CARDIA)     Difficulty of Paying Living Expenses: Not hard at all   Food Insecurity: No Food Insecurity (4/12/2024)    Hunger Vital Sign     Worried About Running Out of Food in the Last Year: Never true     Ran Out of Food in the Last Year: Never true   Transportation Needs: No Transportation Needs (4/12/2024)    PRAPARE - Transportation     Lack of Transportation (Medical): No     Lack of Transportation (Non-Medical): No   Physical Activity: Inactive (4/12/2024)    Exercise Vital Sign     Days of Exercise per Week: 0 days     Minutes of Exercise per Session: 0 min   Stress: Stress Concern Present (4/12/2024)    [employer]  Occupational [employer] - Occupational Stress Questionnaire     Feeling of Stress : To some extent   Social Connections: Unknown (4/12/2024)    Social Connection and Isolation Panel [NHANES]     Frequency of Communication with Friends and Family: More than three times a week     Frequency of Social Gatherings with Friends and Family: More than three times a week     Attends Rastafari Services: Patient declined     Active Member of Clubs or Organizations: Yes     Attends Club or Organization Meetings: More than 4 times per year     Marital Status:    Housing Stability: Low Risk  (4/12/2024)    Housing Stability Vital Sign     Unable to Pay for Housing in the Last Year: No     Number of Places Lived in the Last Year: 1     Unstable Housing in the Last Year: No       Current Outpatient Medications   Medication Sig Dispense Refill    aspirin (ECOTRIN) 81 MG EC tablet Take 81 mg by mouth every morning. (Patient not taking: Reported on 4/12/2024)      docusate sodium (COLACE) 50 MG capsule Take by mouth 2 (two) times daily.      ferrous sulfate (FEOSOL) 325 mg (65 mg iron) Tab tablet Take 1 tablet (325 mg total) by mouth once daily. 30 tablet 11    furosemide (LASIX) 40 MG tablet Take 1 tablet (40 mg total) by mouth once daily. 30 tablet 11    iron-vitamin C 100-250 mg, ICAR-C, 100-250 mg Tab Take 1 tablet by mouth once daily. 30 tablet 0    levothyroxine (SYNTHROID) 88 MCG tablet Take 1 tablet (88 mcg total) by mouth before breakfast. 30 tablet 11    losartan (COZAAR) 100 MG tablet Take 1 tablet (100 mg total) by mouth once daily. 30 tablet 11    polyethylene glycol (GLYCOLAX) 17 gram PwPk Take 17 g by mouth once daily. 30 packet 0    spironolactone (ALDACTONE) 25 MG tablet Take 1 tablet (25 mg total) by mouth once daily. 90 tablet 3    STOOL SOFTENER-STIMULANT LAXAT 8.6-50 mg per tablet Take by mouth.      carvediloL (COREG) 12.5 MG tablet Take 1 tablet (12.5 mg total) by mouth 2 (two) times daily with meals. 60  "tablet 11    clobetasol 0.05% (TEMOVATE) 0.05 % Oint Apply topically 2 (two) times daily. apply to heels 60 g 1    losartan (COZAAR) 50 MG tablet Take 1 tablet (50 mg total) by mouth every evening. 90 tablet 3     No current facility-administered medications for this visit.     Review of patient's allergies indicates:  No Known Allergies    Review of Systems   Cardiovascular:  Positive for leg swelling.        Significant improvement compared to previous visit   Musculoskeletal:  Positive for gait problem.        Walker   All other systems reviewed and are negative.      Objective:      Vitals:    04/11/24 1515   BP: 132/69   BP Location: Left arm   Pulse: 79   Weight: 53.5 kg (118 lb)   Height: 5' 6" (1.676 m)     Physical Exam  Vitals and nursing note reviewed. Exam conducted with a chaperone present.   Constitutional:       General: She is not in acute distress.     Appearance: Normal appearance.   Cardiovascular:      Pulses:           Dorsalis pedis pulses are 1+ on the right side and 1+ on the left side.        Posterior tibial pulses are 0 on the right side and 0 on the left side.   Pulmonary:      Effort: Pulmonary effort is normal.   Musculoskeletal:         General: Deformity present.      Right foot: Decreased range of motion. Deformity (hammertoe 2nd b/l) and bunion present.      Left foot: Decreased range of motion. Deformity and bunion present.   Feet:      Right foot:      Skin integrity: Ulcer (Large raised discolored chronic callus posterior lateral heels, dry) and callus present.      Toenail Condition: Right toenails are abnormally thick. Fungal disease present.     Left foot:      Skin integrity: Ulcer and callus (Small nontender callus 5th digits bilateral without discoloration) present.      Toenail Condition: Left toenails are abnormally thick. Fungal disease present.  Skin:     Capillary Refill: Capillary refill takes more than 3 seconds.   Neurological:      General: No focal deficit " present.      Mental Status: She is alert.      Comments: Neuritis forefoot bilateral   Psychiatric:         Behavior: Behavior normal.         Thought Content: Thought content normal.                                Assessment:       1. Hallux abducto valgus, bilateral    2. Hammer toes of both feet    3. Pre-ulcerative calluses - Left Foot    4. Onychomycosis of toenail          Plan:         Had a lengthy discussion with patient explaining again that these areas on the sides of her toes occur due to pressure.  Advised patient is very good sign that just resolving some swelling in feet and around her toes has allowed more space between the toes to prevent rubbing.  Advised patient this means she needs to be very cautious regarding accumulating any additional fluid within her feet or legs or these areas will start rubbing and come back  Advised she can discontinue clobetasol between the toes and apply any type of moisturizer but it is recommended she do this in the evening so it has all night to dry  Explained needs to keep the skin between the toes dry so it does not accumulate in the webspace or this can develop into athlete's foot or a blister  We did discuss the preulcerative calluses on the back of the heels which have not changed  Reviewed potential complications and continue clobetasol to these areas daily  Posterior heel calluses debrided  5th digits debrided  No complications  Reviewed care and maintenance of skin, nails and calluses. Nails debrided  Check feet daily, contact office with any area of concern which has not improved in a few days  Patient was in understanding and agreement with treatment plan.  I counseled the patient on their conditions, implications and medical management.  Instructed patient/family to contact the office with any changes, questions, concerns, worsening of symptoms.   Total face to face time 20 minutes, exam, assessment, treatment, discussion, additional time for review of  chart prior to and following appointment and visit documentation, consultation and coordination of care.    Follow up as needed      This note was created using [employer] voice recognition software that occasionally misinterpreted phrases or words.

## 2024-04-24 ENCOUNTER — LAB VISIT (OUTPATIENT)
Dept: LAB | Facility: HOSPITAL | Age: 89
End: 2024-04-24
Attending: INTERNAL MEDICINE
Payer: MEDICARE

## 2024-04-24 DIAGNOSIS — N18.31 STAGE 3A CHRONIC KIDNEY DISEASE: ICD-10-CM

## 2024-04-24 DIAGNOSIS — I50.20 HEART FAILURE WITH REDUCED EJECTION FRACTION: ICD-10-CM

## 2024-04-24 DIAGNOSIS — R79.89 PRERENAL AZOTEMIA: ICD-10-CM

## 2024-04-24 LAB
ANION GAP SERPL CALC-SCNC: 9 MMOL/L (ref 8–16)
BUN SERPL-MCNC: 35 MG/DL (ref 10–30)
CALCIUM SERPL-MCNC: 9.2 MG/DL (ref 8.7–10.5)
CHLORIDE SERPL-SCNC: 107 MMOL/L (ref 95–110)
CO2 SERPL-SCNC: 26 MMOL/L (ref 23–29)
CREAT SERPL-MCNC: 1 MG/DL (ref 0.5–1.4)
EST. GFR  (NO RACE VARIABLE): 52.2 ML/MIN/1.73 M^2
GLUCOSE SERPL-MCNC: 82 MG/DL (ref 70–110)
POTASSIUM SERPL-SCNC: 4.7 MMOL/L (ref 3.5–5.1)
SODIUM SERPL-SCNC: 142 MMOL/L (ref 136–145)

## 2024-04-24 PROCEDURE — 36415 COLL VENOUS BLD VENIPUNCTURE: CPT | Performed by: INTERNAL MEDICINE

## 2024-04-24 PROCEDURE — 80048 BASIC METABOLIC PNL TOTAL CA: CPT | Performed by: INTERNAL MEDICINE

## 2024-05-21 ENCOUNTER — TELEPHONE (OUTPATIENT)
Dept: CARDIOLOGY | Facility: CLINIC | Age: 89
End: 2024-05-21
Payer: MEDICARE

## 2024-05-21 NOTE — TELEPHONE ENCOUNTER
LVM to go over the below information.     ----- Message from Hakan Bradley MD sent at 5/21/2024 11:59 AM CDT -----  Regarding: RE: results  Contact: [place]  Her kidney function is stable, stage 3a. At her age the chance of significant worsening is small. Losartan and spironolactone are both protective for the kidney.    Dr. Bradley  ----- Message -----  From: Christel Proctor LPN  Sent: 5/21/2024  10:06 AM CDT  To: Hakan Bradley MD  Subject: results                                          Pts daughter called and is concerned about her mom's kidney function. They never received the results from her lab work. They want to know what the results  show and what are the next steps. Please advise.     Thanks,     Christel Proctor LPN  ----- Message -----  From: Christel Proctor LPN  Sent: 5/20/2024   5:03 PM CDT  To: Christel Proctor LPN      ----- Message -----  From: Alyssa Burnett  Sent: 5/20/2024   2:52 PM CDT  To: [facility] Staff    Type:  Needs Medical Advice    Who Called: [place]  Would the patient rather a call back or a response via [facility]? call back  Best Call Back Number: [phone]  Additional Information: sts she would like to know what re the next steps after her lab work--please advise

## 2024-06-03 PROBLEM — J96.01 ACUTE RESPIRATORY FAILURE WITH HYPOXIA: Status: RESOLVED | Noted: 2024-01-27 | Resolved: 2024-06-03

## 2024-06-17 PROBLEM — I21.4 NSTEMI (NON-ST ELEVATED MYOCARDIAL INFARCTION): Status: RESOLVED | Noted: 2024-01-27 | Resolved: 2024-06-17

## 2024-06-21 ENCOUNTER — HOSPITAL ENCOUNTER (EMERGENCY)
Facility: HOSPITAL | Age: 89
Discharge: HOME | End: 2024-06-21
Attending: STUDENT IN AN ORGANIZED HEALTH CARE EDUCATION/TRAINING PROGRAM
Payer: MEDICARE

## 2024-06-21 VITALS
SYSTOLIC BLOOD PRESSURE: 160 MMHG | WEIGHT: 114 LBS | OXYGEN SATURATION: 99 % | RESPIRATION RATE: 20 BRPM | DIASTOLIC BLOOD PRESSURE: 72 MMHG | HEART RATE: 61 BPM | BODY MASS INDEX: 18.4 KG/M2 | TEMPERATURE: 98 F

## 2024-06-21 DIAGNOSIS — K21.9 GASTROESOPHAGEAL REFLUX DISEASE, UNSPECIFIED WHETHER ESOPHAGITIS PRESENT: Primary | ICD-10-CM

## 2024-06-21 DIAGNOSIS — R07.89 ATYPICAL CHEST PAIN: ICD-10-CM

## 2024-06-21 DIAGNOSIS — R07.9 CHEST PAIN: ICD-10-CM

## 2024-06-21 LAB
ALBUMIN SERPL BCP-MCNC: 3.8 G/DL (ref 3.5–5.2)
ALP SERPL-CCNC: 68 U/L (ref 55–135)
ALT SERPL W/O P-5'-P-CCNC: 12 U/L (ref 10–44)
ANION GAP SERPL CALC-SCNC: 13 MMOL/L (ref 8–16)
AST SERPL-CCNC: 26 U/L (ref 10–40)
BASOPHILS # BLD AUTO: 0.02 K/UL (ref 0–0.2)
BASOPHILS NFR BLD: 0.5 % (ref 0–1.9)
BILIRUB SERPL-MCNC: 0.8 MG/DL (ref 0.1–1)
BNP SERPL-MCNC: 301 PG/ML (ref 0–99)
BUN SERPL-MCNC: 41 MG/DL (ref 10–30)
CALCIUM SERPL-MCNC: 9.5 MG/DL (ref 8.7–10.5)
CHLORIDE SERPL-SCNC: 106 MMOL/L (ref 95–110)
CO2 SERPL-SCNC: 21 MMOL/L (ref 23–29)
CREAT SERPL-MCNC: 1.1 MG/DL (ref 0.5–1.4)
DIFFERENTIAL METHOD BLD: ABNORMAL
EOSINOPHIL # BLD AUTO: 0.2 K/UL (ref 0–0.5)
EOSINOPHIL NFR BLD: 5.3 % (ref 0–8)
ERYTHROCYTE [DISTWIDTH] IN BLOOD BY AUTOMATED COUNT: 13.6 % (ref 11.5–14.5)
EST. GFR  (NO RACE VARIABLE): 46.6 ML/MIN/1.73 M^2
GLUCOSE SERPL-MCNC: 135 MG/DL (ref 70–110)
HCT VFR BLD AUTO: 34.7 % (ref 37–48.5)
HGB BLD-MCNC: 11.2 G/DL (ref 12–16)
IMM GRANULOCYTES # BLD AUTO: 0.03 K/UL (ref 0–0.04)
IMM GRANULOCYTES NFR BLD AUTO: 0.8 % (ref 0–0.5)
LYMPHOCYTES # BLD AUTO: 1 K/UL (ref 1–4.8)
LYMPHOCYTES NFR BLD: 26.3 % (ref 18–48)
MAGNESIUM SERPL-MCNC: 1.7 MG/DL (ref 1.6–2.6)
MCH RBC QN AUTO: 31.9 PG (ref 27–31)
MCHC RBC AUTO-ENTMCNC: 32.3 G/DL (ref 32–36)
MCV RBC AUTO: 99 FL (ref 82–98)
MONOCYTES # BLD AUTO: 0.4 K/UL (ref 0.3–1)
MONOCYTES NFR BLD: 9.6 % (ref 4–15)
NEUTROPHILS # BLD AUTO: 2.3 K/UL (ref 1.8–7.7)
NEUTROPHILS NFR BLD: 57.5 % (ref 38–73)
NRBC BLD-RTO: 0 /100 WBC
OHS QRS DURATION: 134 MS
OHS QTC CALCULATION: 476 MS
PLATELET # BLD AUTO: 146 K/UL (ref 150–450)
PMV BLD AUTO: 10.6 FL (ref 9.2–12.9)
POTASSIUM SERPL-SCNC: 5.2 MMOL/L (ref 3.5–5.1)
PROT SERPL-MCNC: 7.7 G/DL (ref 6–8.4)
RBC # BLD AUTO: 3.51 M/UL (ref 4–5.4)
SODIUM SERPL-SCNC: 140 MMOL/L (ref 136–145)
T4 FREE SERPL-MCNC: 1.08 NG/DL (ref 0.71–1.51)
TROPONIN I SERPL DL<=0.01 NG/ML-MCNC: 0.02 NG/ML (ref 0–0.03)
TROPONIN I SERPL DL<=0.01 NG/ML-MCNC: 0.02 NG/ML (ref 0–0.03)
TSH SERPL DL<=0.005 MIU/L-ACNC: 3.6 UIU/ML (ref 0.4–4)
WBC # BLD AUTO: 3.96 K/UL (ref 3.9–12.7)

## 2024-06-21 PROCEDURE — 96374 THER/PROPH/DIAG INJ IV PUSH: CPT

## 2024-06-21 PROCEDURE — 84443 ASSAY THYROID STIM HORMONE: CPT | Performed by: STUDENT IN AN ORGANIZED HEALTH CARE EDUCATION/TRAINING PROGRAM

## 2024-06-21 PROCEDURE — 25000003 PHARM REV CODE 250: Performed by: STUDENT IN AN ORGANIZED HEALTH CARE EDUCATION/TRAINING PROGRAM

## 2024-06-21 PROCEDURE — 93005 ELECTROCARDIOGRAM TRACING: CPT

## 2024-06-21 PROCEDURE — 99285 EMERGENCY DEPT VISIT HI MDM: CPT | Mod: 25

## 2024-06-21 PROCEDURE — 71045 X-RAY EXAM CHEST 1 VIEW: CPT | Mod: 26,,, | Performed by: RADIOLOGY

## 2024-06-21 PROCEDURE — 94760 N-INVAS EAR/PLS OXIMETRY 1: CPT

## 2024-06-21 PROCEDURE — 83880 ASSAY OF NATRIURETIC PEPTIDE: CPT | Performed by: STUDENT IN AN ORGANIZED HEALTH CARE EDUCATION/TRAINING PROGRAM

## 2024-06-21 PROCEDURE — 36415 COLL VENOUS BLD VENIPUNCTURE: CPT | Performed by: STUDENT IN AN ORGANIZED HEALTH CARE EDUCATION/TRAINING PROGRAM

## 2024-06-21 PROCEDURE — 63600175 PHARM REV CODE 636 W HCPCS: Performed by: STUDENT IN AN ORGANIZED HEALTH CARE EDUCATION/TRAINING PROGRAM

## 2024-06-21 PROCEDURE — 80053 COMPREHEN METABOLIC PANEL: CPT | Performed by: STUDENT IN AN ORGANIZED HEALTH CARE EDUCATION/TRAINING PROGRAM

## 2024-06-21 PROCEDURE — C9113 INJ PANTOPRAZOLE SODIUM, VIA: HCPCS | Performed by: STUDENT IN AN ORGANIZED HEALTH CARE EDUCATION/TRAINING PROGRAM

## 2024-06-21 PROCEDURE — 85025 COMPLETE CBC W/AUTO DIFF WBC: CPT | Performed by: STUDENT IN AN ORGANIZED HEALTH CARE EDUCATION/TRAINING PROGRAM

## 2024-06-21 PROCEDURE — 71045 X-RAY EXAM CHEST 1 VIEW: CPT | Mod: TC

## 2024-06-21 PROCEDURE — 84439 ASSAY OF FREE THYROXINE: CPT | Performed by: STUDENT IN AN ORGANIZED HEALTH CARE EDUCATION/TRAINING PROGRAM

## 2024-06-21 PROCEDURE — 84484 ASSAY OF TROPONIN QUANT: CPT | Performed by: STUDENT IN AN ORGANIZED HEALTH CARE EDUCATION/TRAINING PROGRAM

## 2024-06-21 PROCEDURE — 93010 ELECTROCARDIOGRAM REPORT: CPT | Mod: ,,, | Performed by: INTERNAL MEDICINE

## 2024-06-21 PROCEDURE — 83735 ASSAY OF MAGNESIUM: CPT | Performed by: STUDENT IN AN ORGANIZED HEALTH CARE EDUCATION/TRAINING PROGRAM

## 2024-06-21 RX ORDER — METOCLOPRAMIDE 10 MG/1
5 TABLET ORAL DAILY
Qty: 3 TABLET | Refills: 0 | Status: SHIPPED | OUTPATIENT
Start: 2024-06-21 | End: 2024-06-26

## 2024-06-21 RX ORDER — PANTOPRAZOLE SODIUM 40 MG/10ML
40 INJECTION, POWDER, LYOPHILIZED, FOR SOLUTION INTRAVENOUS
Status: COMPLETED | OUTPATIENT
Start: 2024-06-21 | End: 2024-06-21

## 2024-06-21 RX ORDER — OMEPRAZOLE 40 MG/1
40 CAPSULE, DELAYED RELEASE ORAL DAILY
Qty: 15 CAPSULE | Refills: 0 | Status: SHIPPED | OUTPATIENT
Start: 2024-06-21 | End: 2024-07-06

## 2024-06-21 RX ORDER — ALUMINUM HYDROXIDE, MAGNESIUM HYDROXIDE, AND SIMETHICONE 1200; 120; 1200 MG/30ML; MG/30ML; MG/30ML
30 SUSPENSION ORAL
Status: COMPLETED | OUTPATIENT
Start: 2024-06-21 | End: 2024-06-21

## 2024-06-21 RX ADMIN — PANTOPRAZOLE SODIUM 40 MG: 40 INJECTION, POWDER, FOR SOLUTION INTRAVENOUS at 01:06

## 2024-06-21 RX ADMIN — ALUMINUM HYDROXIDE, MAGNESIUM HYDROXIDE, AND SIMETHICONE 30 ML: 1200; 120; 1200 SUSPENSION ORAL at 01:06

## 2024-06-21 NOTE — DISCHARGE INSTRUCTIONS
Follow up with Gastroenterology as referred  Take omeprazole daily as prescribed  Try Reglan daily as prescribed

## 2024-06-21 NOTE — ED PROVIDER NOTES
Encounter Date: 6/21/2024       History     Chief Complaint   Patient presents with    Chest Pain     Patient reports after consuming food and/or fluids she feels the items get stuck and causes a fullness in chest/epigastric region.  Patient states symptoms started yesterday.  Left posterior chest and flank region with pain present intermittently for the last week.  Denies increased pain to palpation.  No nausea.  No vomiting.  LBM this morning.  Denies pain at present.  Decreased PO intake recently.     89-year-old female with a history of hypertension, thyroid disease, combined CHF, EF less than 20%, CKD constipation, achalasia.   She presents to ED with complaint of burning type chest pain started yesterday after eating dinner.  Denies associated shortness of breath or palpitations.  She reports that sensation of fullness in chest and epigastric region after eating. Tells me that it takes too long for foods to settle down to her stomach.  Denies associated swallowing difficulty.    The history is provided by the patient. No  was used.     Review of patient's allergies indicates:  No Known Allergies  Past Medical History:   Diagnosis Date    Anemia     CHF (congestive heart failure)     Constipation     Hypertension     Thyroid disease      Past Surgical History:   Procedure Laterality Date    CHOLECYSTECTOMY      HYSTERECTOMY       No family history on file.  Social History     Tobacco Use    Smoking status: Never    Smokeless tobacco: Never   Substance Use Topics    Alcohol use: No    Drug use: No     Review of Systems   Constitutional: Negative.    HENT: Negative.     Eyes: Negative.    Respiratory: Negative.     Cardiovascular:  Positive for chest pain.   Gastrointestinal:  Positive for abdominal pain (Epigastric).   Endocrine: Negative.    Genitourinary: Negative.    Musculoskeletal: Negative.    Skin: Negative.    Neurological: Negative.    Hematological: Negative.     Psychiatric/Behavioral: Negative.     All other systems reviewed and are negative.      Physical Exam     Initial Vitals [06/21/24 1054]   BP Pulse Resp Temp SpO2   139/64 65 16 97.7 °F (36.5 °C) 98 %      MAP       --         Physical Exam    Nursing note and vitals reviewed.  Constitutional: She appears well-developed and well-nourished.   HENT:   Head: Normocephalic.   Eyes: Pupils are equal, round, and reactive to light.   Neck:   Normal range of motion.  Cardiovascular:  Normal rate.           Pulmonary/Chest: Breath sounds normal. No respiratory distress.   Abdominal: Abdomen is soft. Bowel sounds are normal.   Musculoskeletal:      Cervical back: Normal range of motion.     Neurological: She is alert and oriented to person, place, and time. GCS score is 15. GCS eye subscore is 4. GCS verbal subscore is 5. GCS motor subscore is 6.   Skin: Skin is warm. Capillary refill takes less than 2 seconds.   Psychiatric: She has a normal mood and affect.         ED Course   Procedures  Labs Reviewed   CBC W/ AUTO DIFFERENTIAL   COMPREHENSIVE METABOLIC PANEL   TROPONIN I   TROPONIN I   B-TYPE NATRIURETIC PEPTIDE   MAGNESIUM   TSH   T4, FREE          Imaging Results    None          Medications - No data to display  Medical Decision Making  89-year-old female with burning chest pain, also describes long transit time after eating.  Differential includes gastroparesis, ACS, gastritis, others  EKG shows normal sinus rhythm, left bundle-branch block, no STEMI  Chest x-ray shows no acute abnormality  Troponin x2 unremarkable  CBC, CMP are all without significant gross abnormalities beyond her baseline  Cardiac etiology not found today.  She probably needs an endoscopy for further evaluation.  Patient was seen and reevaluated. Patient's symptoms seem to be stable. I discussed the patient's diagnosis, treatment plan, and plan for discharge with the patient. Patient was instructed to follow up with GI and was given strict return  precautions to the ED. The patient voiced understanding and agreed with the plan        Amount and/or Complexity of Data Reviewed  Labs: ordered.  Radiology: ordered.    Risk  OTC drugs.  Prescription drug management.                                      Clinical Impression:  Final diagnoses:  [R07.9] Chest pain                 Grupo Brown MD  06/21/24 4320

## 2024-07-02 RX ORDER — CLOBETASOL PROPIONATE 0.5 MG/G
OINTMENT TOPICAL
Qty: 60 G | Refills: 0 | Status: SHIPPED | OUTPATIENT
Start: 2024-07-02

## 2024-07-02 NOTE — TELEPHONE ENCOUNTER
Please see the attached refill request.    Last office visit was 04/11/2024.    Thank you,   CHINMAY Caban

## 2024-07-12 ENCOUNTER — OFFICE VISIT (OUTPATIENT)
Dept: CARDIOLOGY | Facility: CLINIC | Age: 89
End: 2024-07-12
Payer: MEDICARE

## 2024-07-12 ENCOUNTER — LAB VISIT (OUTPATIENT)
Dept: LAB | Facility: HOSPITAL | Age: 89
End: 2024-07-12
Attending: INTERNAL MEDICINE
Payer: MEDICARE

## 2024-07-12 VITALS
WEIGHT: 122.13 LBS | SYSTOLIC BLOOD PRESSURE: 128 MMHG | HEART RATE: 69 BPM | OXYGEN SATURATION: 99 % | BODY MASS INDEX: 19.63 KG/M2 | HEIGHT: 66 IN | DIASTOLIC BLOOD PRESSURE: 62 MMHG

## 2024-07-12 DIAGNOSIS — D51.0 PERNICIOUS ANEMIA: ICD-10-CM

## 2024-07-12 DIAGNOSIS — N18.31 STAGE 3A CHRONIC KIDNEY DISEASE: ICD-10-CM

## 2024-07-12 DIAGNOSIS — Z91.89 SEDENTARY LIFESTYLE: ICD-10-CM

## 2024-07-12 DIAGNOSIS — Z87.19 S/P DILATATION OF ESOPHAGEAL STRICTURE: ICD-10-CM

## 2024-07-12 DIAGNOSIS — Z98.890 S/P DILATATION OF ESOPHAGEAL STRICTURE: ICD-10-CM

## 2024-07-12 DIAGNOSIS — R53.82 CHRONIC FATIGUE: ICD-10-CM

## 2024-07-12 DIAGNOSIS — R79.89 PRERENAL AZOTEMIA: ICD-10-CM

## 2024-07-12 DIAGNOSIS — I10 HYPERTENSION, UNSPECIFIED TYPE: ICD-10-CM

## 2024-07-12 DIAGNOSIS — I44.7 LBBB (LEFT BUNDLE BRANCH BLOCK): ICD-10-CM

## 2024-07-12 DIAGNOSIS — R07.2 PRECORDIAL CHEST PAIN: ICD-10-CM

## 2024-07-12 DIAGNOSIS — I50.20 HEART FAILURE WITH REDUCED EJECTION FRACTION: ICD-10-CM

## 2024-07-12 DIAGNOSIS — Z01.810 PREOP CARDIOVASCULAR EXAM: Primary | ICD-10-CM

## 2024-07-12 DIAGNOSIS — I35.0 SEVERE AORTIC VALVE STENOSIS: ICD-10-CM

## 2024-07-12 LAB
IRON SERPL-MCNC: 78 UG/DL (ref 30–160)
RETICS/RBC NFR AUTO: 1.3 % (ref 0.5–2.5)
SATURATED IRON: 28 % (ref 20–50)
TOTAL IRON BINDING CAPACITY: 278 UG/DL (ref 250–450)
TRANSFERRIN SERPL-MCNC: 188 MG/DL (ref 200–375)

## 2024-07-12 PROCEDURE — 99214 OFFICE O/P EST MOD 30 MIN: CPT | Mod: PBBFAC | Performed by: INTERNAL MEDICINE

## 2024-07-12 PROCEDURE — 82728 ASSAY OF FERRITIN: CPT | Performed by: INTERNAL MEDICINE

## 2024-07-12 PROCEDURE — 36415 COLL VENOUS BLD VENIPUNCTURE: CPT | Performed by: INTERNAL MEDICINE

## 2024-07-12 PROCEDURE — 83540 ASSAY OF IRON: CPT | Performed by: INTERNAL MEDICINE

## 2024-07-12 PROCEDURE — 85045 AUTOMATED RETICULOCYTE COUNT: CPT | Performed by: INTERNAL MEDICINE

## 2024-07-12 PROCEDURE — 83921 ORGANIC ACID SINGLE QUANT: CPT | Performed by: INTERNAL MEDICINE

## 2024-07-12 PROCEDURE — 99999 PR PBB SHADOW E&M-EST. PATIENT-LVL IV: CPT | Mod: PBBFAC,,, | Performed by: INTERNAL MEDICINE

## 2024-07-12 RX ORDER — METOCLOPRAMIDE 10 MG/1
10 TABLET ORAL
COMMUNITY
Start: 2024-06-26

## 2024-07-12 RX ORDER — PHENOL/SODIUM PHENOLATE
AEROSOL, SPRAY (ML) MUCOUS MEMBRANE
COMMUNITY
Start: 2024-06-26

## 2024-07-12 RX ORDER — MULTIVITAMIN WITH MINERALS
TABLET ORAL
COMMUNITY
Start: 2024-05-29

## 2024-07-12 NOTE — PROGRESS NOTES
Subjective:    Patient ID:  Re Arciniega is a 89 y.o. female who presents for evaluation of Follow-up  Hospitalist and referred by Yg Talavera MD for acute on chronic HFrEF, HTN, post ED for CP, 6/21/2024.  PCP: Anthony Emanuel MD   Surgeon and referred by Jonel Husain MD, pre-op review, esophageal dilation 7/25/2024  Lives with 2 sons, Hossein is helpful,  is a outdoor smoker  Daughter, Georgia, here with patient, and Nahomy help with medications. Stefany help with meals.  DNR status but no Advanced directives.    SDOH: [place], some visual impairment, need glasses to read, transportation  Health literacy: medium   Activities: mostly sedentary post HF, limited by being weak and tired.  Nicotine: never   Alcohol: none  Illicit drugs: none  Cardiac symptoms: weak all over, no energy  Home BP: no recent check  Medication compliance: yes, Nahomy sets  up  Diet: regular, no salt   Caffeine: none  Labs:   Lab Results   Component Value Date    TSH 3.599 06/21/2024        Lab Results   Component Value Date    HGBA1C 5.2 01/27/2024       Lab Results   Component Value Date    WBC 3.96 06/21/2024    HGB 11.2 (L) 06/21/2024    HCT 34.7 (L) 06/21/2024    MCV 99 (H) 06/21/2024     (L) 06/21/2024       CMP  Sodium   Date Value Ref Range Status   06/21/2024 140 136 - 145 mmol/L Final   04/24/2024 142 136 - 145 mmol/L Final   04/12/2024 146 (H) 136 - 145 mmol/L Final     Potassium   Date Value Ref Range Status   06/21/2024 5.2 (H) 3.5 - 5.1 mmol/L Final   04/24/2024 4.7 3.5 - 5.1 mmol/L Final   04/12/2024 4.3 3.5 - 5.1 mmol/L Final     Chloride   Date Value Ref Range Status   06/21/2024 106 95 - 110 mmol/L Final   04/24/2024 107 95 - 110 mmol/L Final   04/12/2024 108 95 - 110 mmol/L Final     CO2   Date Value Ref Range Status   06/21/2024 21 (L) 23 - 29 mmol/L Final   04/24/2024 26 23 - 29 mmol/L Final   04/12/2024 27 23 - 29 mmol/L Final     Glucose   Date Value Ref Range Status   06/21/2024 135 (H) 70 - 110 mg/dL  Final   04/24/2024 82 70 - 110 mg/dL Final   04/12/2024 82 70 - 110 mg/dL Final     BUN   Date Value Ref Range Status   06/21/2024 41 (H) 10 - 30 mg/dL Final   04/24/2024 35 (H) 10 - 30 mg/dL Final   04/12/2024 37 (H) 10 - 30 mg/dL Final     Creatinine   Date Value Ref Range Status   06/21/2024 1.1 0.5 - 1.4 mg/dL Final   04/24/2024 1.0 0.5 - 1.4 mg/dL Final   04/12/2024 1.0 0.5 - 1.4 mg/dL Final     Calcium   Date Value Ref Range Status   06/21/2024 9.5 8.7 - 10.5 mg/dL Final   04/24/2024 9.2 8.7 - 10.5 mg/dL Final   04/12/2024 9.4 8.7 - 10.5 mg/dL Final     Total Protein   Date Value Ref Range Status   06/21/2024 7.7 6.0 - 8.4 g/dL Final   03/03/2024 6.9 6.0 - 8.4 g/dL Final   03/02/2024 6.8 6.0 - 8.4 g/dL Final     Albumin   Date Value Ref Range Status   06/21/2024 3.8 3.5 - 5.2 g/dL Final   03/03/2024 3.4 (L) 3.5 - 5.2 g/dL Final   03/02/2024 3.5 3.5 - 5.2 g/dL Final     Total Bilirubin   Date Value Ref Range Status   06/21/2024 0.8 0.1 - 1.0 mg/dL Final     Comment:     For infants and newborns, interpretation of results should be based  on gestational age, weight and in agreement with clinical  observations.    Premature Infant recommended reference ranges:  Up to 24 hours.............<8.0 mg/dL  Up to 48 hours............<12.0 mg/dL  3-5 days..................<15.0 mg/dL  6-29 days.................<15.0 mg/dL     03/03/2024 0.5 0.1 - 1.0 mg/dL Final     Comment:     For infants and newborns, interpretation of results should be based  on gestational age, weight and in agreement with clinical  observations.    Premature Infant recommended reference ranges:  Up to 24 hours.............<8.0 mg/dL  Up to 48 hours............<12.0 mg/dL  3-5 days..................<15.0 mg/dL  6-29 days.................<15.0 mg/dL     03/02/2024 0.7 0.1 - 1.0 mg/dL Final     Comment:     For infants and newborns, interpretation of results should be based  on gestational age, weight and in agreement with  "clinical  observations.    Premature Infant recommended reference ranges:  Up to 24 hours.............<8.0 mg/dL  Up to 48 hours............<12.0 mg/dL  3-5 days..................<15.0 mg/dL  6-29 days.................<15.0 mg/dL       Alkaline Phosphatase   Date Value Ref Range Status   2024 68 55 - 135 U/L Final   2024 63 55 - 135 U/L Final   2024 61 55 - 135 U/L Final     AST   Date Value Ref Range Status   2024 26 10 - 40 U/L Final   2024 16 10 - 40 U/L Final   2024 18 10 - 40 U/L Final     ALT   Date Value Ref Range Status   2024 12 10 - 44 U/L Final   2024 7 (L) 10 - 44 U/L Final   2024 10 10 - 44 U/L Final     Anion Gap   Date Value Ref Range Status   2024 13 8 - 16 mmol/L Final   2024 9 8 - 16 mmol/L Final   2024 11 8 - 16 mmol/L Final     eGFR   Date Value Ref Range Status   2024 46.6 (A) >60 mL/min/1.73 m^2 Final   2024 52.2 (A) >60 mL/min/1.73 m^2 Final   2024 52.5 (A) >60 mL/min/1.73 m^2 Final     @labrcntip(troponini)@    BNP   Date Value Ref Range Status   2024 301 (H) 0 - 99 pg/mL Final     Comment:     Values of less than 100 pg/ml are consistent with non-CHF populations.   }   Lab Results   Component Value Date    CHOL 193 2024     Lab Results   Component Value Date    HDL 81 (H) 2024     Lab Results   Component Value Date    LDLCALC 103.2 2024     Lab Results   Component Value Date    TRIG 44 2024     Lab Results   Component Value Date    CHOLHDL 42.0 2024       Last Echo: 2024  Last stress test: none  Cardiovascular angiogram: none  EC2024 NSR, rate 63, LAE, left [place], LBBB  Fundoscopic exam: within the past year, negative for H    In 3/2024:  AAF referred for recurrent HFrEF not on optimum GDMT. Only noted generalized weakness post DC. Been quite sedentary since HF in 2024 with NSTEMI.     Yg Talavera MD noted on DCS 3/3/2024 "Patient is a 89-year-old " "female that has a past medical history of congestive heart failure hypertension hypothyroidism anemia and constipation patient presented last evening with a history of shortness of breath that has worsened over the past 2 days prior to presentation.  Patient states that she seemed to become worse over the last 1 day but it started approximately 2 days ago.  Patient was recently hospitalized for similar case with increased troponin on that visit.  Echocardiogram results are listed above.  Patient does have history of congestive heart failure and states that she was not on her Lasix at home.  Patient was discharged on the previous admission by me to [facility].  Patient now lives with her son at home.  Patient has difficulty with taking care of herself at home.  However she does have help from her son and daughter.  Patient states that she had 2 pillow orthopnea and states that she could not lay flat without becoming more shortness short of breath.    89-year-old female with a history of congestive heart failure.  Patient presented with increasing shortness of breath and found to be in mild congestive heart failure.  Diuresis was started with Lasix 40 mg IV twice daily.  Patient responded well and had no shortness a breath during this admission.  Patient also required no oxygen supplementation.  Continue same medications and ensured this patient was on a congestive heart failure regimen.  Patient will be sent home and care for by her daughter.  We ensure that this patient will be on Lasix 40 mg daily, potassium supplementation, and we will continue her other medications as prescribed.  Patient will follow up with her primary care physician within 1 week and her cardiologist soon after."    Echo 1/2024 -  Left Ventricle: The left ventricle is normal in size. Mildly increased wall thickness. Severe global hypokinesis present. There is severely reduced systolic function with a visually estimated ejection fraction of " "20 - 25%. Ejection fraction by visual approximation is 20%. Global longitudinal strain is -7%. Reduced. There is diastolic dysfunction.  ·  Right Ventricle: Normal right ventricular cavity size. Systolic function is severely reduced. TAPSE is 1.80 cm.  ·  Left Atrium: Left atrium is moderately dilated.  ·  Aortic Valve: The aortic valve is a trileaflet valve. Severely restricted motion. There is severe stenosis. Aortic valve area by VTI is 0.92 cm². Aortic valve peak velocity is 1.96 m/s. Mean gradient is 9 mmHg. The dimensionless index is 0.37. There is mild aortic regurgitation.  ·  Mitral Valve: There is moderate regurgitation.  ·  Tricuspid Valve: There is mild regurgitation.  ·  Pulmonic Valve: There is mild regurgitation.  ·  IVC/SVC: Normal venous pressure at 3 mmHg.  ·  Pericardium: There is a small effusion. No indication of cardiac tamponade.  ·  Significant deterioation of LV function from LVEF of 45% in Echo of 7/2023. There is also progression of LAE.    CXR - heart is prominent in size but unchanged.  Interstitial pulmonary edema appears similar to the previous study.  No focal consolidation is present.  Trace pleural effusions are not ruled out.     In 4/2024, return for 4-weeks follow up. Little change remain sedentary with easy fatigue. No problem with medication. No low BP reported.     HPI comments: in 7/2024, here for pre-op review for esophageal dilatation. Last dilatation in 1/2023 and was effective and now with recurrent problem. Had ED review for CP in 6/2024. Feels very tired and week.    ED noted 6/21/2024 "  Chest Pain        Patient reports after consuming food and/or fluids she feels the items get stuck and causes a fullness in chest/epigastric region.  Patient states symptoms started yesterday.  Left posterior chest and flank region with pain present intermittently for the last week.  Denies increased pain to palpation.  No nausea.  No vomiting.  LBM this morning.  Denies pain at " "present.  Decreased PO intake recently.      89-year-old female with a history of hypertension, thyroid disease, combined CHF, EF less than 20%, CKD constipation, achalasia.   She presents to ED with complaint of burning type chest pain started yesterday after eating dinner.  Denies associated shortness of breath or palpitations.  She reports that sensation of fullness in chest and epigastric region after eating. Tells me that it takes too long for foods to settle down to her stomach.  Denies associated swallowing difficulty.    VS - 139/64  65  16  97.7 °F (36.5 °C)  98 %    89-year-old female with burning chest pain, also describes long transit time after eating.  Differential includes gastroparesis, ACS, gastritis, others  EKG shows normal sinus rhythm, left bundle-branch block, no STEMI  Chest x-ray shows no acute abnormality  Troponin x2 unremarkable  CBC, CMP are all without significant gross abnormalities beyond her baseline  Cardiac etiology not found today.  She probably needs an endoscopy for further evaluation.  Patient was seen and reevaluated. Patient's symptoms seem to be stable. I discussed the patient's diagnosis, treatment plan, and plan for discharge with the patient. Patient was instructed to follow up with GI and was given strict return precautions to the ED."    CXR - heart is enlarged and unchanged. The aorta is calcified and ectatic. The lungs are currently clear with resolution of the findings suggestive of pulmonary edema noted previously. There is no pleural effusion.    Review of Systems   Constitutional: Positive for malaise/fatigue and weight gain (up 6 lbs from 4/2024). Negative for chills, decreased appetite, diaphoresis, fever, night sweats and weight loss.   HENT:  Positive for congestion and hearing loss. Negative for hoarse voice, nosebleeds, sore throat and tinnitus.    Eyes:  Positive for discharge. Negative for double vision, pain and visual disturbance.   Cardiovascular:  " Positive for leg swelling. Negative for chest pain, claudication, cyanosis, dyspnea on exertion, irregular heartbeat, near-syncope, orthopnea, palpitations and paroxysmal nocturnal dyspnea.   Respiratory:  Positive for snoring. Negative for cough, sleep disturbances due to breathing, sputum production and wheezing.         [place] score 9 today 3, awaken tired, daughter have sleep apnea, decline sleep study   Endocrine: Negative for cold intolerance, heat intolerance, polydipsia and polyuria.   Hematologic/Lymphatic: Negative for bleeding problem. Does not bruise/bleed easily.   Skin:  Negative for color change, flushing, nail changes, poor wound healing and suspicious lesions.   Musculoskeletal:  Positive for joint pain. Negative for arthritis, falls, gout, joint swelling, muscle cramps, muscle weakness, myalgias and neck pain.   Gastrointestinal:  Positive for constipation. Negative for change in bowel habit, diarrhea, dysphagia, heartburn, hematemesis, hematochezia, jaundice, melena and nausea.   Genitourinary:  Negative for bladder incontinence, dysuria, frequency, hematuria and hesitancy.   Neurological:  Positive for tremors and weakness. Negative for disturbances in coordination, excessive daytime sleepiness, dizziness, focal weakness, headaches, light-headedness, loss of balance, numbness, paresthesias, seizures and vertigo.   Psychiatric/Behavioral:  Positive for depression. Negative for memory loss and substance abuse. The patient is nervous/anxious. The patient does not have insomnia.       Objective:    Physical Exam  Constitutional:       Appearance: She is well-developed.      Comments: RA O2 sat 99%,   Orthostatic VS: sitting 128/62   HENT:      Head: Normocephalic.   Eyes:      Conjunctiva/sclera: Conjunctivae normal.      Pupils: Pupils are equal, round, and reactive to light.   Neck:      Thyroid: No thyromegaly.      Vascular: No JVD.   Cardiovascular:      Rate and Rhythm: Normal rate and regular  rhythm.      Pulses: Intact distal pulses.           Carotid pulses are 1+ on the right side with bruit and 1+ on the left side with bruit.       Radial pulses are 2+ on the right side and 1+ on the left side.         Right dorsalis pedis pulse not accessible and left dorsalis pedis pulse not accessible.         Right posterior tibial pulse not accessible and left posterior tibial pulse not accessible.      Heart sounds: Murmur heard.      Medium-pitched harsh mid to late systolic murmur is present with a grade of 3/6 at the upper right sternal border radiating to the neck.      No friction rub. No gallop.   Pulmonary:      Effort: Pulmonary effort is normal.      Breath sounds: No rales.      Comments: Diminished breath sounds  Chest:      Chest wall: No tenderness.   Abdominal:      General: Bowel sounds are normal.      Palpations: Abdomen is soft.      Tenderness: There is no abdominal tenderness.   Musculoskeletal:         General: Normal range of motion.      Cervical back: Normal range of motion and neck supple.      Right lower leg: Edema (2+ pitting aroung the ankle) present.      Left lower leg: Edema (2+ pitting around the ankle) present.   Lymphadenopathy:      Cervical: No cervical adenopathy.   Skin:     General: Skin is warm and dry.      Findings: Erythema (stasis dermartitis, right leg > left) present. No rash.   Neurological:      Mental Status: She is alert and oriented to person, place, and time.      Comments: Tremors, left > right           Assessment:       1. Preop cardiovascular exam    2. Hypertension, unspecified type    3. Heart failure with reduced ejection fraction    4. Precordial chest pain    5. S/P dilatation of esophageal stricture, early 2023    6. Sedentary lifestyle, since HF 1/2024    7. LBBB (left bundle branch block)    8. Stage 3a chronic kidney disease    9. Severe aortic valve stenosis    10. Chronic fatigue    11. Pernicious anemia    12. Prerenal azotemia           Plan:        Re was seen today for follow-up.    Diagnoses and all orders for this visit:    Preop cardiovascular exam    Hypertension, unspecified type    Heart failure with reduced ejection fraction  -     IN OFFICE EKG 12-LEAD (to Muse)    Precordial chest pain  -     IN OFFICE EKG 12-LEAD (to Muse)    S/P dilatation of esophageal stricture, early 2023    Sedentary lifestyle, since HF 1/2024    LBBB (left bundle branch block)    Stage 3a chronic kidney disease    Severe aortic valve stenosis    Chronic fatigue    Pernicious anemia  -     Ferritin; Future  -     Iron and TIBC; Future  -     Reticulocytes; Future  -     Vitamin B12 Deficiency Panel; Future    Prerenal azotemia    - All medical issues reviewed, continue current Rx, Give info on Advance Directives.  - Warning signs of MI and stroke given, if symptoms last more than 5 minutes, stop immediately and call 911, then chew 2-4 low-dose ASA (81 mg).  - Need to remain well hydrated but avoid drinking within 4 hours of bedtime. Recommend at least 90 oz of fluid daily per IOM (institute of Medicine) suggestion.   - Clear for Surgery and anesthesia with acceptable risk from the cardiac standpoint. Will be at increase risk for complications secondary to patient's co-morbidities.   - CV status and all medications reviewed, patient acknowledge good understanding.  - Recommend healthy living: healthy diet and regular exercise aiming for fitness, restorative sleep and weight control  - Need good exercise program, 4 key elements: 1. Aerobic (walking, swimming, dancing, jogging, biking, etc, 2. Muscle strengthening / resistance exercise, need to do 2-3 times weekly, 3. Stretching daily, good stretch takes a whole  total minute. 4. Balance exercise daily.   - Encourage activities as much as tolerated. Any activity is better than none!   - Can start with up and down from chair 10 times, 3 times daily and work up to 30 times, 3 times daily over the  next 2 weeks.    - Instruction for Mediterranean diet and heart healthy exercise given.  - Highly recommend 30-60 minutes of exercise / activities daily, can have Sunday off, with 2-3 sessions of muscle strengthening weekly. A  would be very helpful.  - Recommend at least biannual cardiovascular evaluation in view of patient's significant risk factors, family's preference.  - Need to have family member accompany patient at each doctor visit.     Total time spend including review of record prior to face-to-face visit is 40 minutes. Greater than 50% of the time was spent in counseling and coordination of care. The above assessment and plan have been discussed at length. Referring provider's note reviewed. Labs and procedure over the last 6 months reviewed. Problem List updated. Asked to bring in all active medications / pills bottles with next visit. Will send note to referring / PCP.

## 2024-07-12 NOTE — LETTER
July 12, 2024      Anthony Emanuel MD  [address]  [facility] [place] [address]           [facility] - Cardiology  [address]  [address]  [facility] [place] [address]  Phone: [phone]  Fax: [phone]          Patient: Re Arciniega   MR Number: [id number]   YOB: 1930   Date of Visit: 7/12/2024       Dear Aaareferral Self:    Thank you for referring Re Arciniega to me for evaluation. Attached you will find relevant portions of my assessment and plan of care.    If you have questions, please do not hesitate to call me. I look forward to following Re Arciniega along with you.    Sincerely,    Hakan Bradley MD    Enclosure  CC:    No Recipients    If you would like to receive this communication electronically, please contact [email or web address] or [phone] to request more information on [facility] access.    For providers and/or their staff who would like to refer a patient to [facility], please contact us through our one-stop-shop provider referral line, [facility], at [phone].    If you feel you have received this communication in error or would no longer like to receive these types of communications, please e-mail [email or web address]

## 2024-07-13 LAB — FERRITIN SERPL-MCNC: 506 NG/ML (ref 20–300)

## 2024-07-15 ENCOUNTER — TELEPHONE (OUTPATIENT)
Dept: CARDIOLOGY | Facility: CLINIC | Age: 89
End: 2024-07-15
Payer: MEDICARE

## 2024-07-15 NOTE — TELEPHONE ENCOUNTER
No answer, left voicemail explaining the blood work results were okay per Dr. Bradley. Mee, CHINMAY 07/15/2024

## 2024-07-15 NOTE — TELEPHONE ENCOUNTER
----- Message from Hakan Bradley MD sent at 7/15/2024 10:00 AM CDT -----  No [facility], please phone review, results are OK, thanks, Dr. Bradley

## 2024-07-16 ENCOUNTER — TELEPHONE (OUTPATIENT)
Dept: CARDIOLOGY | Facility: CLINIC | Age: 89
End: 2024-07-16
Payer: MEDICARE

## 2024-07-16 LAB — VIT B12 SERPL-MCNC: 240 NG/L (ref 180–914)

## 2024-07-16 NOTE — TELEPHONE ENCOUNTER
----- Message from Hakan Bradley MD sent at 7/16/2024 11:44 AM CDT -----  No [facility], please phone review, OK results, thanks, Dr. Bradley

## 2024-07-17 LAB — METHYLMALONATE SERPL-SCNC: 0.27 NMOL/ML

## 2024-07-30 ENCOUNTER — OFFICE VISIT (OUTPATIENT)
Dept: PODIATRY | Facility: CLINIC | Age: 89
End: 2024-07-30
Payer: MEDICARE

## 2024-07-30 VITALS
SYSTOLIC BLOOD PRESSURE: 111 MMHG | WEIGHT: 122 LBS | HEART RATE: 72 BPM | DIASTOLIC BLOOD PRESSURE: 53 MMHG | HEIGHT: 66 IN | BODY MASS INDEX: 19.61 KG/M2

## 2024-07-30 DIAGNOSIS — M19.071 ARTHRITIS OF BOTH FEET: Primary | ICD-10-CM

## 2024-07-30 DIAGNOSIS — M20.41 HAMMER TOES OF BOTH FEET: ICD-10-CM

## 2024-07-30 DIAGNOSIS — B35.1 ONYCHOMYCOSIS OF TOENAIL: ICD-10-CM

## 2024-07-30 DIAGNOSIS — M20.42 HAMMER TOES OF BOTH FEET: ICD-10-CM

## 2024-07-30 DIAGNOSIS — L84 PRE-ULCERATIVE CALLUSES: ICD-10-CM

## 2024-07-30 DIAGNOSIS — M20.12 HALLUX ABDUCTO VALGUS, BILATERAL: ICD-10-CM

## 2024-07-30 DIAGNOSIS — M20.11 HALLUX ABDUCTO VALGUS, BILATERAL: ICD-10-CM

## 2024-07-30 DIAGNOSIS — M19.072 ARTHRITIS OF BOTH FEET: Primary | ICD-10-CM

## 2024-07-30 PROCEDURE — 99214 OFFICE O/P EST MOD 30 MIN: CPT | Mod: PBBFAC | Performed by: PODIATRIST

## 2024-07-30 PROCEDURE — 99999 PR PBB SHADOW E&M-EST. PATIENT-LVL IV: CPT | Mod: PBBFAC,,, | Performed by: PODIATRIST

## 2024-07-30 PROCEDURE — 99213 OFFICE O/P EST LOW 20 MIN: CPT | Mod: S$PBB,,, | Performed by: PODIATRIST

## 2024-08-03 NOTE — PROGRESS NOTES
Subjective:       Patient ID: Re Arciniega is a 89 y.o. female.    Chief Complaint: Follow-up, Foot Pain, and Callouses  Patient presents with her daughter for follow-up bunions, hammertoes, pain on the back of mainly the left heel.  Since patient was hospitalized for CHF and started on diuretics they relate swelling has slowly continued to go down in her feet and legs.  She is on a diuretic daily.  Patient relates color of skin seems more dark as the swelling goes down, no pain in her legs.  Relates the back of the left heel is the area which is usually painful each night when sleeping.  She is still applying clobetasol to the sides of both 2nd digits where she had developed ulcers due to pressure and these have resolved since the swelling improved in her feet      Past Medical History:   Diagnosis Date    Anemia     CHF (congestive heart failure)     Constipation     Hypertension     Thyroid disease      Past Surgical History:   Procedure Laterality Date    CHOLECYSTECTOMY      HYSTERECTOMY       No family history on file.  Social History     Socioeconomic History    Marital status:    Tobacco Use    Smoking status: Never    Smokeless tobacco: Never   Substance and Sexual Activity    Alcohol use: No    Drug use: No    Sexual activity: Never     Social Determinants of Health     Financial Resource Strain: Low Risk  (4/12/2024)    Overall Financial Resource Strain (CARDIA)     Difficulty of Paying Living Expenses: Not hard at all   Food Insecurity: No Food Insecurity (4/12/2024)    Hunger Vital Sign     Worried About Running Out of Food in the Last Year: Never true     Ran Out of Food in the Last Year: Never true   Transportation Needs: No Transportation Needs (4/12/2024)    PRAPARE - Transportation     Lack of Transportation (Medical): No     Lack of Transportation (Non-Medical): No   Physical Activity: Inactive (4/12/2024)    Exercise Vital Sign     Days of Exercise per Week: 0 days     Minutes of Exercise  per Session: 0 min   Stress: Stress Concern Present (4/12/2024)    [employer] - Occupational Stress Questionnaire     Feeling of Stress : To some extent   Housing Stability: Low Risk  (4/12/2024)    Housing Stability Vital Sign     Unable to Pay for Housing in the Last Year: No     Number of Places Lived in the Last Year: 1     Unstable Housing in the Last Year: No       Current Outpatient Medications   Medication Sig Dispense Refill    aspirin (ECOTRIN) 81 MG EC tablet Take 81 mg by mouth every morning.      carvediloL (COREG) 12.5 MG tablet Take 1 tablet (12.5 mg total) by mouth 2 (two) times daily with meals. 60 tablet 11    clobetasol 0.05% (TEMOVATE) 0.05 % Oint APPLY OINTMENT TOPICALLY TWICE DAILY TO HEELS 60 g 0    docusate sodium (COLACE) 50 MG capsule Take by mouth 2 (two) times daily.      ferrous sulfate (FEOSOL) 325 mg (65 mg iron) Tab tablet Take 1 tablet (325 mg total) by mouth once daily. 30 tablet 11    furosemide (LASIX) 40 MG tablet Take 1 tablet (40 mg total) by mouth once daily. 30 tablet 11    IRON 325 mg (65 mg iron) Tab tablet TAKE 1 TABLET BY MOUTH IN THE MORNING      iron-vitamin C 100-250 mg, ICAR-C, 100-250 mg Tab Take 1 tablet by mouth once daily. 30 tablet 0    levothyroxine (SYNTHROID) 88 MCG tablet Take 1 tablet (88 mcg total) by mouth before breakfast. 30 tablet 11    losartan (COZAAR) 100 MG tablet Take 1 tablet (100 mg total) by mouth once daily. 30 tablet 11    losartan (COZAAR) 50 MG tablet Take 1 tablet (50 mg total) by mouth every evening. 90 tablet 3    metoclopramide HCl (REGLAN) 10 MG tablet 10 mg.      omeprazole 20 mg TbEC Oral, Daily, 0 Refill(s), Maintenance      polyethylene glycol (GLYCOLAX) 17 gram PwPk Take 17 g by mouth once daily. 30 packet 0    spironolactone (ALDACTONE) 25 MG tablet Take 1 tablet (25 mg total) by mouth once daily. 90 tablet 3    STOOL SOFTENER-STIMULANT LAXAT 8.6-50 mg per tablet Take by mouth.       No current  "facility-administered medications for this visit.     Review of patient's allergies indicates:  No Known Allergies    Review of Systems   Cardiovascular:  Positive for leg swelling.        Significant improvement    Musculoskeletal:  Positive for gait problem.        Walker   All other systems reviewed and are negative.      Objective:      Vitals:    07/30/24 1414   BP: (!) 111/53   Pulse: 72   Weight: 55.3 kg (122 lb)   Height: 5' 6" (1.676 m)     Physical Exam  Vitals and nursing note reviewed. Exam conducted with a chaperone present.   Constitutional:       General: She is not in acute distress.     Appearance: Normal appearance.   Cardiovascular:      Pulses:           Dorsalis pedis pulses are 1+ on the right side and 1+ on the left side.        Posterior tibial pulses are 0 on the right side and 0 on the left side.   Pulmonary:      Effort: Pulmonary effort is normal.   Musculoskeletal:         General: Deformity present.      Right foot: Decreased range of motion. Deformity (hammertoe 2nd b/l) and bunion present.      Left foot: Decreased range of motion. Deformity and bunion present.   Feet:      Right foot:      Skin integrity: Callus (Much improved chronic callus posterior right heel without pain) present.      Toenail Condition: Right toenails are abnormally thick. Fungal disease present.     Left foot:      Skin integrity: Callus (Tender chronic callus posterior lateral left heel) present.      Toenail Condition: Left toenails are abnormally thick. Fungal disease present.  Skin:     Capillary Refill: Capillary refill takes more than 3 seconds.   Neurological:      General: No focal deficit present.      Mental Status: She is alert.      Comments: Neuritis forefoot bilateral   Psychiatric:         Thought Content: Thought content normal.                          Assessment:       1. Arthritis of both feet    2. Hammer toes of both feet    3. Hallux abducto valgus, bilateral    4. Pre-ulcerative calluses " - Left Foot    5. Onychomycosis of toenail            Plan:         We discussed care and maintenance of skin feet and lower legs, apply a good moisturizer daily, recommended before bed  Explained discoloration very common once the swelling goes down, this discoloration usually indicates some damage to the skin  Explained it is best if she keeps the skin well hydrated, soft and moisturize to prevent any complications  Swelling has gone down significantly but still present in the legs more so than the feet  Second digit ulcers remain healed.  Continue clobetasol daily, there is still bony prominence on the side of the toe, want to avoid friction  Reviewed extent of arthritis in her feet due to multiple conditions in the importance of soft wide appropriate shoes  Explained callus on the back of the right heel has decreased significantly, area debrided, no pain, continue clobetasol daily or moisturizer  Advised patient callus on the back of the left heel is still about the same size, raised, tender and reassured no skin opening, no redness or warmth.  Area was debrided with no complications and encouraged patient to use a pillow under this area when sleeping, avoid digging this left heel into the mattress which may be a contributing factor, however I do feel both of these occurred due to rubbing in her shoes when she had a lot of swelling putting pressure on the back of the heels.  Hopefully they will continue to improve over time as well as long as the swelling is controlled  Reviewed care and maintenance of fungal nails, we reviewed multiple topical treatments, recommended Vicks vapor rub, rub in a small amount daily.  Needs to make sure it stays localized to the nail, do not get on the surrounding skin or between the toes.  Nails debrided in thickness reduced  Check feet daily, contact office with any area of concern which has not improved in a few days  Patient was in understanding and agreement with treatment  plan.  I counseled the patient on their conditions, implications and medical management.  Instructed patient/family to contact the office with any changes, questions, concerns, worsening of symptoms.   Total face to face time 20 minutes, exam, assessment, treatment, discussion, additional time for review of chart prior to and following appointment and visit documentation, consultation and coordination of care.    Follow up as needed      This note was created using M*Modal voice recognition software that occasionally misinterpreted phrases or words.

## 2024-09-09 RX ORDER — CLOBETASOL PROPIONATE 0.5 MG/G
OINTMENT TOPICAL
Qty: 60 G | Refills: 0 | Status: SHIPPED | OUTPATIENT
Start: 2024-09-09

## 2024-09-09 NOTE — TELEPHONE ENCOUNTER
Please see the attached refill request.    Last office visit was 07/30/2024.    Thank you,   CHINMAY Caban

## 2024-09-13 NOTE — PLAN OF CARE
Problem: Adult Inpatient Plan of Care  Goal: Plan of Care Review  Outcome: Ongoing, Progressing  Goal: Patient-Specific Goal (Individualized)  Outcome: Ongoing, Progressing  Goal: Absence of Hospital-Acquired Illness or Injury  Outcome: Ongoing, Progressing  Goal: Optimal Comfort and Wellbeing  Outcome: Ongoing, Progressing  Goal: Readiness for Transition of Care  Outcome: Ongoing, Progressing      Hem/Onc follow up as OP

## 2024-10-31 ENCOUNTER — OFFICE VISIT (OUTPATIENT)
Dept: PODIATRY | Facility: CLINIC | Age: 89
End: 2024-10-31
Payer: MEDICARE

## 2024-10-31 VITALS
HEIGHT: 66 IN | HEART RATE: 73 BPM | RESPIRATION RATE: 18 BRPM | BODY MASS INDEX: 19.25 KG/M2 | SYSTOLIC BLOOD PRESSURE: 138 MMHG | DIASTOLIC BLOOD PRESSURE: 70 MMHG | WEIGHT: 119.81 LBS

## 2024-10-31 DIAGNOSIS — M20.41 HAMMER TOES OF BOTH FEET: Primary | ICD-10-CM

## 2024-10-31 DIAGNOSIS — M19.072 ARTHRITIS OF BOTH FEET: ICD-10-CM

## 2024-10-31 DIAGNOSIS — L84 PRE-ULCERATIVE CALLUSES: ICD-10-CM

## 2024-10-31 DIAGNOSIS — M19.071 ARTHRITIS OF BOTH FEET: ICD-10-CM

## 2024-10-31 DIAGNOSIS — B35.1 ONYCHOMYCOSIS OF TOENAIL: ICD-10-CM

## 2024-10-31 DIAGNOSIS — R60.0 EDEMA OF BOTH LOWER EXTREMITIES: ICD-10-CM

## 2024-10-31 DIAGNOSIS — M20.12 HALLUX ABDUCTO VALGUS, BILATERAL: ICD-10-CM

## 2024-10-31 DIAGNOSIS — M20.42 HAMMER TOES OF BOTH FEET: Primary | ICD-10-CM

## 2024-10-31 DIAGNOSIS — M20.11 HALLUX ABDUCTO VALGUS, BILATERAL: ICD-10-CM

## 2024-10-31 PROCEDURE — 99213 OFFICE O/P EST LOW 20 MIN: CPT | Mod: S$PBB,,, | Performed by: PODIATRIST

## 2024-10-31 PROCEDURE — 99999 PR PBB SHADOW E&M-EST. PATIENT-LVL IV: CPT | Mod: PBBFAC,,, | Performed by: PODIATRIST

## 2024-10-31 PROCEDURE — 99214 OFFICE O/P EST MOD 30 MIN: CPT | Mod: PBBFAC | Performed by: PODIATRIST

## 2024-10-31 RX ORDER — OMEPRAZOLE 40 MG/1
40 CAPSULE, DELAYED RELEASE ORAL
COMMUNITY
Start: 2024-06-21

## 2024-12-23 RX ORDER — CLOBETASOL PROPIONATE 0.5 MG/G
OINTMENT TOPICAL
Qty: 60 G | Refills: 0 | Status: SHIPPED | OUTPATIENT
Start: 2024-12-23

## 2024-12-23 NOTE — TELEPHONE ENCOUNTER
Refill request for clobetasol 0.05% ointment sent to [facility] in [place], [place].    Last office visit was 10/31/2024.    CHINMAY Caban 12/23/2024

## 2025-01-17 ENCOUNTER — OFFICE VISIT (OUTPATIENT)
Dept: CARDIOLOGY | Facility: CLINIC | Age: OVER 89
End: 2025-01-17
Payer: MEDICARE

## 2025-01-17 VITALS
HEART RATE: 73 BPM | SYSTOLIC BLOOD PRESSURE: 150 MMHG | DIASTOLIC BLOOD PRESSURE: 66 MMHG | WEIGHT: 114 LBS | HEIGHT: 66 IN | BODY MASS INDEX: 18.32 KG/M2 | OXYGEN SATURATION: 97 %

## 2025-01-17 DIAGNOSIS — I50.20 HEART FAILURE WITH REDUCED EJECTION FRACTION: ICD-10-CM

## 2025-01-17 DIAGNOSIS — E87.5 HYPERKALEMIA: ICD-10-CM

## 2025-01-17 DIAGNOSIS — R60.0 PERIPHERAL EDEMA: ICD-10-CM

## 2025-01-17 DIAGNOSIS — R54 FRAIL ELDERLY: ICD-10-CM

## 2025-01-17 DIAGNOSIS — D64.9 ANEMIA, UNSPECIFIED TYPE: ICD-10-CM

## 2025-01-17 DIAGNOSIS — N18.31 STAGE 3A CHRONIC KIDNEY DISEASE: ICD-10-CM

## 2025-01-17 DIAGNOSIS — I83.11 VARICOSE VEINS OF BOTH LOWER EXTREMITIES WITH INFLAMMATION: ICD-10-CM

## 2025-01-17 DIAGNOSIS — I83.12 VARICOSE VEINS OF BOTH LOWER EXTREMITIES WITH INFLAMMATION: ICD-10-CM

## 2025-01-17 DIAGNOSIS — M17.0 PRIMARY OSTEOARTHRITIS OF BOTH KNEES: Primary | ICD-10-CM

## 2025-01-17 PROCEDURE — 99213 OFFICE O/P EST LOW 20 MIN: CPT | Mod: PBBFAC | Performed by: INTERNAL MEDICINE

## 2025-01-17 PROCEDURE — 99214 OFFICE O/P EST MOD 30 MIN: CPT | Mod: S$PBB,,, | Performed by: INTERNAL MEDICINE

## 2025-01-17 PROCEDURE — 99999 PR PBB SHADOW E&M-EST. PATIENT-LVL III: CPT | Mod: PBBFAC,,, | Performed by: INTERNAL MEDICINE

## 2025-01-17 NOTE — PROGRESS NOTES
Subjective:    Patient ID:  Re Arciniega is a 89 y.o. female who presents for evaluation of Follow-up (6 month)  Hospitalist and referred by Yg Talavera MD for acute on chronic HFrEF, HTN, post ED for CP, 6/21/2024. 6-months review.  PCP: Anthony Emanuel MD   Surgeon and referred by Jonel Husain MD, pre-op review, esophageal dilation 7/25/2024  Lives with 2 sons, Hossein is helpful,  is a outdoor smoker  Daughter, Georgia, and Nahomy, here with patient, help with medications. Stefany help with meals.  DNR status but no Advanced directives.    SDOH: [place], some visual impairment, need glasses to read, transportation  Health literacy: medium   Activities: mostly sedentary post HF, limited by being weak and tired.  Nicotine: never   Alcohol: none  Illicit drugs: none  Cardiac symptoms: weak all over, no energy  Home BP: no recent check  Medication compliance: yes, Nahomy sets  up  Diet: regular, no salt   Caffeine: none  Labs:   Lab Results   Component Value Date    TSH 3.599 06/21/2024        Lab Results   Component Value Date    HGBA1C 5.2 01/27/2024       Lab Results   Component Value Date    WBC 3.96 06/21/2024    HGB 11.2 (L) 06/21/2024    HCT 34.7 (L) 06/21/2024    MCV 99 (H) 06/21/2024     (L) 06/21/2024       CMP  Sodium   Date Value Ref Range Status   06/21/2024 140 136 - 145 mmol/L Final   04/24/2024 142 136 - 145 mmol/L Final   04/12/2024 146 (H) 136 - 145 mmol/L Final     Potassium   Date Value Ref Range Status   06/21/2024 5.2 (H) 3.5 - 5.1 mmol/L Final   04/24/2024 4.7 3.5 - 5.1 mmol/L Final   04/12/2024 4.3 3.5 - 5.1 mmol/L Final     Chloride   Date Value Ref Range Status   06/21/2024 106 95 - 110 mmol/L Final   04/24/2024 107 95 - 110 mmol/L Final   04/12/2024 108 95 - 110 mmol/L Final     CO2   Date Value Ref Range Status   06/21/2024 21 (L) 23 - 29 mmol/L Final   04/24/2024 26 23 - 29 mmol/L Final   04/12/2024 27 23 - 29 mmol/L Final     Glucose   Date Value Ref Range Status   06/21/2024  135 (H) 70 - 110 mg/dL Final   04/24/2024 82 70 - 110 mg/dL Final   04/12/2024 82 70 - 110 mg/dL Final     BUN   Date Value Ref Range Status   06/21/2024 41 (H) 10 - 30 mg/dL Final   04/24/2024 35 (H) 10 - 30 mg/dL Final   04/12/2024 37 (H) 10 - 30 mg/dL Final     Creatinine   Date Value Ref Range Status   06/21/2024 1.1 0.5 - 1.4 mg/dL Final   04/24/2024 1.0 0.5 - 1.4 mg/dL Final   04/12/2024 1.0 0.5 - 1.4 mg/dL Final     Calcium   Date Value Ref Range Status   06/21/2024 9.5 8.7 - 10.5 mg/dL Final   04/24/2024 9.2 8.7 - 10.5 mg/dL Final   04/12/2024 9.4 8.7 - 10.5 mg/dL Final     Total Protein   Date Value Ref Range Status   06/21/2024 7.7 6.0 - 8.4 g/dL Final   03/03/2024 6.9 6.0 - 8.4 g/dL Final   03/02/2024 6.8 6.0 - 8.4 g/dL Final     Albumin   Date Value Ref Range Status   06/21/2024 3.8 3.5 - 5.2 g/dL Final   03/03/2024 3.4 (L) 3.5 - 5.2 g/dL Final   03/02/2024 3.5 3.5 - 5.2 g/dL Final     Total Bilirubin   Date Value Ref Range Status   06/21/2024 0.8 0.1 - 1.0 mg/dL Final     Comment:     For infants and newborns, interpretation of results should be based  on gestational age, weight and in agreement with clinical  observations.    Premature Infant recommended reference ranges:  Up to 24 hours.............<8.0 mg/dL  Up to 48 hours............<12.0 mg/dL  3-5 days..................<15.0 mg/dL  6-29 days.................<15.0 mg/dL     03/03/2024 0.5 0.1 - 1.0 mg/dL Final     Comment:     For infants and newborns, interpretation of results should be based  on gestational age, weight and in agreement with clinical  observations.    Premature Infant recommended reference ranges:  Up to 24 hours.............<8.0 mg/dL  Up to 48 hours............<12.0 mg/dL  3-5 days..................<15.0 mg/dL  6-29 days.................<15.0 mg/dL     03/02/2024 0.7 0.1 - 1.0 mg/dL Final     Comment:     For infants and newborns, interpretation of results should be based  on gestational age, weight and in agreement with  "clinical  observations.    Premature Infant recommended reference ranges:  Up to 24 hours.............<8.0 mg/dL  Up to 48 hours............<12.0 mg/dL  3-5 days..................<15.0 mg/dL  6-29 days.................<15.0 mg/dL       Alkaline Phosphatase   Date Value Ref Range Status   06/21/2024 68 55 - 135 U/L Final   03/03/2024 63 55 - 135 U/L Final   03/02/2024 61 55 - 135 U/L Final     AST   Date Value Ref Range Status   06/21/2024 26 10 - 40 U/L Final   03/03/2024 16 10 - 40 U/L Final   03/02/2024 18 10 - 40 U/L Final     ALT   Date Value Ref Range Status   06/21/2024 12 10 - 44 U/L Final   03/03/2024 7 (L) 10 - 44 U/L Final   03/02/2024 10 10 - 44 U/L Final     Anion Gap   Date Value Ref Range Status   06/21/2024 13 8 - 16 mmol/L Final   04/24/2024 9 8 - 16 mmol/L Final   04/12/2024 11 8 - 16 mmol/L Final     eGFR   Date Value Ref Range Status   06/21/2024 46.6 (A) >60 mL/min/1.73 m^2 Final   04/24/2024 52.2 (A) >60 mL/min/1.73 m^2 Final   04/12/2024 52.5 (A) >60 mL/min/1.73 m^2 Final     @labrcntip(troponini)@    BNP   Date Value Ref Range Status   06/21/2024 301 (H) 0 - 99 pg/mL Final     Comment:     Values of less than 100 pg/ml are consistent with non-CHF populations.   }   Lab Results   Component Value Date    CHOL 193 01/27/2024     Lab Results   Component Value Date    HDL 81 (H) 01/27/2024     Lab Results   Component Value Date    LDLCALC 103.2 01/27/2024     Lab Results   Component Value Date    TRIG 44 01/27/2024     Lab Results   Component Value Date    CHOLHDL 42.0 01/27/2024       Last Echo: 1/2024  Last stress test: none  Cardiovascular angiogram: none  ECG: NSR, rate 70, LAE, left [place], LBBB  Fundoscopic exam: within the past year, negative for retinopathy    In 3/2024:  AAF referred for recurrent HFrEF not on optimum GDMT. Only noted generalized weakness post DC. Been quite sedentary since HF in 1/2024 with NSTEMI.     Yg Talavera MD noted on DCS 3/3/2024 "Patient is a 89-year-old " "female that has a past medical history of congestive heart failure hypertension hypothyroidism anemia and constipation patient presented last evening with a history of shortness of breath that has worsened over the past 2 days prior to presentation.  Patient states that she seemed to become worse over the last 1 day but it started approximately 2 days ago.  Patient was recently hospitalized for similar case with increased troponin on that visit.  Echocardiogram results are listed above.  Patient does have history of congestive heart failure and states that she was not on her Lasix at home.  Patient was discharged on the previous admission by me to [facility].  Patient now lives with her son at home.  Patient has difficulty with taking care of herself at home.  However she does have help from her son and daughter.  Patient states that she had 2 pillow orthopnea and states that she could not lay flat without becoming more shortness short of breath.    89-year-old female with a history of congestive heart failure.  Patient presented with increasing shortness of breath and found to be in mild congestive heart failure.  Diuresis was started with Lasix 40 mg IV twice daily.  Patient responded well and had no shortness a breath during this admission.  Patient also required no oxygen supplementation.  Continue same medications and ensured this patient was on a congestive heart failure regimen.  Patient will be sent home and care for by her daughter.  We ensure that this patient will be on Lasix 40 mg daily, potassium supplementation, and we will continue her other medications as prescribed.  Patient will follow up with her primary care physician within 1 week and her cardiologist soon after."    Echo 1/2024 -  Left Ventricle: The left ventricle is normal in size. Mildly increased wall thickness. Severe global hypokinesis present. There is severely reduced systolic function with a visually estimated ejection fraction of " "20 - 25%. Ejection fraction by visual approximation is 20%. Global longitudinal strain is -7%. Reduced. There is diastolic dysfunction.  ·  Right Ventricle: Normal right ventricular cavity size. Systolic function is severely reduced. TAPSE is 1.80 cm.  ·  Left Atrium: Left atrium is moderately dilated.  ·  Aortic Valve: The aortic valve is a trileaflet valve. Severely restricted motion. There is severe stenosis. Aortic valve area by VTI is 0.92 cm². Aortic valve peak velocity is 1.96 m/s. Mean gradient is 9 mmHg. The dimensionless index is 0.37. There is mild aortic regurgitation.  ·  Mitral Valve: There is moderate regurgitation.  ·  Tricuspid Valve: There is mild regurgitation.  ·  Pulmonic Valve: There is mild regurgitation.  ·  IVC/SVC: Normal venous pressure at 3 mmHg.  ·  Pericardium: There is a small effusion. No indication of cardiac tamponade.  ·  Significant deterioation of LV function from LVEF of 45% in Echo of 7/2023. There is also progression of LAE.    CXR - heart is prominent in size but unchanged.  Interstitial pulmonary edema appears similar to the previous study.  No focal consolidation is present.  Trace pleural effusions are not ruled out.     In 4/2024, return for 4-weeks follow up. Little change remain sedentary with easy fatigue. No problem with medication. No low BP reported.     In 7/2024, here for pre-op review for esophageal dilatation. Last dilatation in 1/2023 and was effective and now with recurrent problem. Had ED review for CP in 6/2024. Feels very tired and week.    ED noted 6/21/2024 "  Chest Pain        Patient reports after consuming food and/or fluids she feels the items get stuck and causes a fullness in chest/epigastric region.  Patient states symptoms started yesterday.  Left posterior chest and flank region with pain present intermittently for the last week.  Denies increased pain to palpation.  No nausea.  No vomiting.  LBM this morning.  Denies pain at present.  Decreased " "PO intake recently.      89-year-old female with a history of hypertension, thyroid disease, combined CHF, EF less than 20%, CKD constipation, achalasia.   She presents to ED with complaint of burning type chest pain started yesterday after eating dinner.  Denies associated shortness of breath or palpitations.  She reports that sensation of fullness in chest and epigastric region after eating. Tells me that it takes too long for foods to settle down to her stomach.  Denies associated swallowing difficulty.    VS - 139/64  65  16  97.7 °F (36.5 °C)  98 %    89-year-old female with burning chest pain, also describes long transit time after eating.  Differential includes gastroparesis, ACS, gastritis, others  EKG shows normal sinus rhythm, left bundle-branch block, no STEMI  Chest x-ray shows no acute abnormality  Troponin x2 unremarkable  CBC, CMP are all without significant gross abnormalities beyond her baseline  Cardiac etiology not found today.  She probably needs an endoscopy for further evaluation.  Patient was seen and reevaluated. Patient's symptoms seem to be stable. I discussed the patient's diagnosis, treatment plan, and plan for discharge with the patient. Patient was instructed to follow up with GI and was given strict return precautions to the ED."    CXR - heart is enlarged and unchanged. The aorta is calcified and ectatic. The lungs are currently clear with resolution of the findings suggestive of pulmonary edema noted previously. There is no pleural effusion.    HPI comments: in 1/2025, return for review. Most limited by OA of both knee, right > left. Using Tylenol but not certain the amount. No heart issues.     Review of Systems   Constitutional: Positive for malaise/fatigue and weight gain (up 6 lbs from 4/2024). Negative for chills, decreased appetite, diaphoresis, fever, night sweats and weight loss.   HENT:  Positive for congestion and hearing loss. Negative for hoarse voice, nosebleeds, sore " throat and tinnitus.    Eyes:  Positive for discharge. Negative for double vision, pain and visual disturbance.   Cardiovascular:  Positive for leg swelling. Negative for chest pain, claudication, cyanosis, dyspnea on exertion, irregular heartbeat, near-syncope, orthopnea, palpitations and paroxysmal nocturnal dyspnea.   Respiratory:  Positive for snoring. Negative for cough, sleep disturbances due to breathing, sputum production and wheezing.         [place] score 9 today 5, awaken tired, daughter have sleep apnea, decline sleep study   Endocrine: Negative for cold intolerance, heat intolerance, polydipsia and polyuria.   Hematologic/Lymphatic: Negative for bleeding problem. Does not bruise/bleed easily.   Skin:  Negative for color change, flushing, nail changes, poor wound healing and suspicious lesions.   Musculoskeletal:  Positive for arthritis and joint pain. Negative for falls, gout, joint swelling, muscle cramps, muscle weakness, myalgias and neck pain.   Gastrointestinal:  Positive for constipation. Negative for change in bowel habit, diarrhea, dysphagia, heartburn, hematemesis, hematochezia, jaundice, melena and nausea.   Genitourinary:  Negative for bladder incontinence, dysuria, frequency, hematuria and hesitancy.   Neurological:  Positive for excessive daytime sleepiness, tremors and weakness. Negative for disturbances in coordination, dizziness, focal weakness, headaches, light-headedness, loss of balance, numbness, paresthesias, seizures and vertigo.   Psychiatric/Behavioral:  Positive for depression. Negative for memory loss and substance abuse. The patient is nervous/anxious. The patient does not have insomnia.       Objective:    Physical Exam  Constitutional:       Appearance: She is well-developed.      Comments: RA O2 sat 97%,   Orthostatic BP: sitting 150/66   HENT:      Head: Normocephalic.   Eyes:      Conjunctiva/sclera: Conjunctivae normal.      Pupils: Pupils are equal, round, and reactive  to light.   Neck:      Thyroid: No thyromegaly.      Vascular: No JVD.   Cardiovascular:      Rate and Rhythm: Normal rate and regular rhythm.      Pulses: Intact distal pulses.           Carotid pulses are 1+ on the right side with bruit and 1+ on the left side with bruit.       Radial pulses are 2+ on the right side and 1+ on the left side.         Right dorsalis pedis pulse not accessible and left dorsalis pedis pulse not accessible.         Right posterior tibial pulse not accessible and left posterior tibial pulse not accessible.      Heart sounds: Murmur heard.      Medium-pitched harsh mid to late systolic murmur is present with a grade of 3/6 at the upper right sternal border radiating to the neck.      No friction rub. No gallop.   Pulmonary:      Effort: Pulmonary effort is normal.      Breath sounds: No rales.      Comments: Diminished breath sounds  Chest:      Chest wall: No tenderness.   Abdominal:      General: Bowel sounds are normal.      Palpations: Abdomen is soft.      Tenderness: There is no abdominal tenderness.   Musculoskeletal:         General: Normal range of motion.      Cervical back: Normal range of motion and neck supple.      Right lower leg: Edema (2+ pitting aroung the ankle) present.      Left lower leg: Edema (2+ pitting around the ankle) present.   Lymphadenopathy:      Cervical: No cervical adenopathy.   Skin:     General: Skin is warm and dry.      Findings: Erythema (stasis dermartitis, right leg > left) present. No rash.   Neurological:      Mental Status: She is alert and oriented to person, place, and time.      Comments: Tremors, left > right           Assessment:       1. Primary osteoarthritis of both knees    2. Heart failure with reduced ejection fraction    3. Frail elderly    4. Anemia, unspecified type    5. Hyperkalemia    6. Stage 3a chronic kidney disease    7. Varicose veins of both lower extremities with inflammation    8. Peripheral edema           Plan:      "Re was seen today for follow-up.    Diagnoses and all orders for this visit:    Primary osteoarthritis of both knees    Heart failure with reduced ejection fraction    Frail elderly    Anemia, unspecified type    Hyperkalemia    Stage 3a chronic kidney disease    Varicose veins of both lower extremities with inflammation    Peripheral edema    - All medical issues reviewed, continue current Rx, Give info on Advance Directives.  - Refer to "Chronic Pain" article in Consumer Report 6/2019 issue.   - Tylenol 500 mg, take 2 at bedtime, 2 in the morning and additional 2 as needed for mild pain during the day.   - Warning signs of MI and stroke given, if symptoms last more than 5 minutes, stop immediately and call 911, then chew 2-4 low-dose ASA (81 mg).  - Need to remain well hydrated but avoid drinking within 4 hours of bedtime. Recommend at least 90 oz of fluid daily per IOM (institute of Medicine) suggestion.   - CV status and all medications reviewed, patient acknowledge good understanding.  - Recommend healthy living: healthy diet and regular exercise aiming for fitness, restorative sleep and weight control  - Need good exercise program, 4 key elements: 1. Aerobic (walking, swimming, dancing, jogging, biking, etc, 2. Muscle strengthening / resistance exercise, need to do 2-3 times weekly, 3. Stretching daily, good stretch takes a whole  total minute. 4. Balance exercise daily.   - Encourage activities as much as tolerated. Any activity is better than none!   - Can start with up and down from chair 10 times, 3 times daily and work up to 30 times, 3 times daily over the  next 2 weeks.   - Instruction for Mediterranean diet and heart healthy exercise given.  - Highly recommend 30-60 minutes of exercise / activities daily, can have Sunday off, with 2-3 sessions of muscle strengthening weekly. A  would be very helpful.  - Recommend at least biannual cardiovascular evaluation in view of patient's " significant risk factors, family's preference.  - Need to have family member accompany patient at each doctor visit.     Total time spend including review of record prior to face-to-face visit is 30 minutes. Greater than 50% of the time was spent in counseling and coordination of care. The above assessment and plan have been discussed at length. Referring provider's note reviewed. Labs and procedure over the last 6 months reviewed. Problem List updated. Asked to bring in all active medications / pills bottles with next visit. Will send note to referring / PCP.

## 2025-01-20 RX ORDER — CLOBETASOL PROPIONATE 0.5 MG/G
OINTMENT TOPICAL
Qty: 60 G | Refills: 0 | Status: ON HOLD | OUTPATIENT
Start: 2025-01-20

## 2025-01-24 LAB
OHS QRS DURATION: 124 MS
OHS QTC CALCULATION: 488 MS

## 2025-01-31 ENCOUNTER — OFFICE VISIT (OUTPATIENT)
Dept: PODIATRY | Facility: CLINIC | Age: OVER 89
DRG: 552 | End: 2025-01-31
Payer: MEDICARE

## 2025-01-31 VITALS
HEIGHT: 66 IN | HEART RATE: 73 BPM | SYSTOLIC BLOOD PRESSURE: 138 MMHG | WEIGHT: 120 LBS | BODY MASS INDEX: 19.29 KG/M2 | DIASTOLIC BLOOD PRESSURE: 69 MMHG

## 2025-01-31 DIAGNOSIS — M19.071 ARTHRITIS OF BOTH FEET: ICD-10-CM

## 2025-01-31 DIAGNOSIS — M79.675 PAIN IN LEFT TOE(S): ICD-10-CM

## 2025-01-31 DIAGNOSIS — M20.12 HALLUX ABDUCTO VALGUS, BILATERAL: ICD-10-CM

## 2025-01-31 DIAGNOSIS — M19.072 ARTHRITIS OF BOTH FEET: ICD-10-CM

## 2025-01-31 DIAGNOSIS — M20.42 HAMMER TOES OF BOTH FEET: ICD-10-CM

## 2025-01-31 DIAGNOSIS — M20.42 HAMMERTOE OF SECOND TOE OF LEFT FOOT: Primary | ICD-10-CM

## 2025-01-31 DIAGNOSIS — M20.41 HAMMER TOES OF BOTH FEET: ICD-10-CM

## 2025-01-31 DIAGNOSIS — L60.0 INGROWN NAIL OF GREAT TOE OF LEFT FOOT: ICD-10-CM

## 2025-01-31 DIAGNOSIS — R60.0 EDEMA OF BOTH LOWER EXTREMITIES: ICD-10-CM

## 2025-01-31 DIAGNOSIS — M20.11 HALLUX ABDUCTO VALGUS, BILATERAL: ICD-10-CM

## 2025-01-31 DIAGNOSIS — L84 PRE-ULCERATIVE CALLUSES: ICD-10-CM

## 2025-01-31 PROCEDURE — 99214 OFFICE O/P EST MOD 30 MIN: CPT | Mod: S$PBB,,, | Performed by: PODIATRIST

## 2025-01-31 PROCEDURE — 99999 PR PBB SHADOW E&M-EST. PATIENT-LVL IV: CPT | Mod: PBBFAC,,, | Performed by: PODIATRIST

## 2025-01-31 PROCEDURE — 99214 OFFICE O/P EST MOD 30 MIN: CPT | Mod: PBBFAC | Performed by: PODIATRIST

## 2025-02-02 ENCOUNTER — HOSPITAL ENCOUNTER (INPATIENT)
Facility: HOSPITAL | Age: OVER 89
LOS: 4 days | Discharge: NURSING, REHAB OR LONG-TERM CARE | DRG: 552 | End: 2025-02-07
Attending: STUDENT IN AN ORGANIZED HEALTH CARE EDUCATION/TRAINING PROGRAM | Admitting: STUDENT IN AN ORGANIZED HEALTH CARE EDUCATION/TRAINING PROGRAM
Payer: MEDICARE

## 2025-02-02 DIAGNOSIS — E03.9 HYPOTHYROIDISM, UNSPECIFIED TYPE: ICD-10-CM
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
## 2025-02-02 DIAGNOSIS — N18.31 STAGE 3A CHRONIC KIDNEY DISEASE: ICD-10-CM

## 2025-02-02 DIAGNOSIS — R53.81 PHYSICAL DEBILITY: ICD-10-CM

## 2025-02-02 DIAGNOSIS — Z71.89 ADVANCED CARE PLANNING/COUNSELING DISCUSSION: ICD-10-CM

## 2025-02-02 DIAGNOSIS — M17.0 PRIMARY OSTEOARTHRITIS OF BOTH KNEES: ICD-10-CM

## 2025-02-02 DIAGNOSIS — M25.551 BILATERAL HIP PAIN: ICD-10-CM

## 2025-02-02 DIAGNOSIS — Z82.0 FAMILY HISTORY OF SLEEP APNEA: ICD-10-CM

## 2025-02-02 DIAGNOSIS — R74.01 TRANSAMINITIS: ICD-10-CM

## 2025-02-02 DIAGNOSIS — I83.12 VARICOSE VEINS OF BOTH LOWER EXTREMITIES WITH INFLAMMATION: ICD-10-CM

## 2025-02-02 DIAGNOSIS — Z98.890 S/P DILATATION OF ESOPHAGEAL STRICTURE: ICD-10-CM

## 2025-02-02 DIAGNOSIS — E83.42 HYPOMAGNESEMIA: ICD-10-CM

## 2025-02-02 DIAGNOSIS — G47.19 EXCESSIVE DAYTIME SLEEPINESS: ICD-10-CM

## 2025-02-02 DIAGNOSIS — D51.0 PERNICIOUS ANEMIA: ICD-10-CM

## 2025-02-02 DIAGNOSIS — Z87.09 HISTORY OF ASTHMA: ICD-10-CM

## 2025-02-02 DIAGNOSIS — R79.89 PRERENAL AZOTEMIA: ICD-10-CM

## 2025-02-02 DIAGNOSIS — E03.9 ACQUIRED HYPOTHYROIDISM: ICD-10-CM

## 2025-02-02 DIAGNOSIS — M25.552 BILATERAL HIP PAIN: ICD-10-CM

## 2025-02-02 DIAGNOSIS — R53.81 DEBILITY: ICD-10-CM

## 2025-02-02 DIAGNOSIS — I10 HYPERTENSION, UNSPECIFIED TYPE: ICD-10-CM

## 2025-02-02 DIAGNOSIS — R53.82 CHRONIC FATIGUE: ICD-10-CM

## 2025-02-02 DIAGNOSIS — R60.0 PERIPHERAL EDEMA: ICD-10-CM

## 2025-02-02 DIAGNOSIS — Z91.89 SEDENTARY LIFESTYLE: ICD-10-CM

## 2025-02-02 DIAGNOSIS — I35.0 SEVERE AORTIC VALVE STENOSIS: ICD-10-CM

## 2025-02-02 DIAGNOSIS — R79.89 ELEVATED TROPONIN: ICD-10-CM

## 2025-02-02 DIAGNOSIS — D64.9 ANEMIA, UNSPECIFIED TYPE: ICD-10-CM

## 2025-02-02 DIAGNOSIS — I44.7 LBBB (LEFT BUNDLE BRANCH BLOCK): ICD-10-CM

## 2025-02-02 DIAGNOSIS — R54 FRAIL ELDERLY: ICD-10-CM

## 2025-02-02 DIAGNOSIS — Z87.19 S/P DILATATION OF ESOPHAGEAL STRICTURE: ICD-10-CM

## 2025-02-02 DIAGNOSIS — E87.5 HYPERKALEMIA: ICD-10-CM

## 2025-02-02 DIAGNOSIS — M25.461 EFFUSION OF RIGHT KNEE JOINT: ICD-10-CM

## 2025-02-02 DIAGNOSIS — R07.9 CHEST PAIN: ICD-10-CM

## 2025-02-02 DIAGNOSIS — I83.11 VARICOSE VEINS OF BOTH LOWER EXTREMITIES WITH INFLAMMATION: ICD-10-CM

## 2025-02-02 DIAGNOSIS — M25.561 ACUTE PAIN OF RIGHT KNEE: ICD-10-CM

## 2025-02-02 DIAGNOSIS — I50.20 HEART FAILURE WITH REDUCED EJECTION FRACTION: ICD-10-CM

## 2025-02-02 DIAGNOSIS — M25.561 RIGHT KNEE PAIN, UNSPECIFIED CHRONICITY: ICD-10-CM

## 2025-02-02 LAB
ALBUMIN SERPL BCP-MCNC: 3.2 G/DL (ref 3.5–5.2)
ALP SERPL-CCNC: 71 U/L (ref 40–150)
ALT SERPL W/O P-5'-P-CCNC: 6 U/L (ref 10–44)
ANION GAP SERPL CALC-SCNC: 12 MMOL/L (ref 8–16)
AST SERPL-CCNC: 15 U/L (ref 10–40)
BACTERIA #/AREA URNS HPF: ABNORMAL /HPF
BASOPHILS # BLD AUTO: 0.03 K/UL (ref 0–0.2)
BASOPHILS NFR BLD: 0.7 % (ref 0–1.9)
BILIRUB SERPL-MCNC: 0.3 MG/DL (ref 0.1–1)
BILIRUB UR QL STRIP: NEGATIVE
BNP SERPL-MCNC: 311 PG/ML (ref 0–99)
BUN SERPL-MCNC: 30 MG/DL (ref 10–30)
CALCIUM SERPL-MCNC: 8.4 MG/DL (ref 8.7–10.5)
CHLORIDE SERPL-SCNC: 110 MMOL/L (ref 95–110)
CLARITY UR: CLEAR
CO2 SERPL-SCNC: 24 MMOL/L (ref 23–29)
COLOR UR: YELLOW
CREAT SERPL-MCNC: 1.3 MG/DL (ref 0.5–1.4)
DIFFERENTIAL METHOD BLD: ABNORMAL
EOSINOPHIL # BLD AUTO: 0.1 K/UL (ref 0–0.5)
EOSINOPHIL NFR BLD: 3.1 % (ref 0–8)
ERYTHROCYTE [DISTWIDTH] IN BLOOD BY AUTOMATED COUNT: 14 % (ref 11.5–14.5)
EST. GFR  (NO RACE VARIABLE): 38.1 ML/MIN/1.73 M^2
GLUCOSE SERPL-MCNC: 95 MG/DL (ref 70–110)
GLUCOSE UR QL STRIP: NEGATIVE
HCT VFR BLD AUTO: 30.6 % (ref 37–48.5)
HGB BLD-MCNC: 10.1 G/DL (ref 12–16)
HGB UR QL STRIP: NEGATIVE
IMM GRANULOCYTES # BLD AUTO: 0.02 K/UL (ref 0–0.04)
IMM GRANULOCYTES NFR BLD AUTO: 0.4 % (ref 0–0.5)
KETONES UR QL STRIP: NEGATIVE
LEUKOCYTE ESTERASE UR QL STRIP: ABNORMAL
LYMPHOCYTES # BLD AUTO: 1.2 K/UL (ref 1–4.8)
LYMPHOCYTES NFR BLD: 25.8 % (ref 18–48)
MCH RBC QN AUTO: 32.3 PG (ref 27–31)
MCHC RBC AUTO-ENTMCNC: 33 G/DL (ref 32–36)
MCV RBC AUTO: 98 FL (ref 82–98)
MICROSCOPIC COMMENT: ABNORMAL
MONOCYTES # BLD AUTO: 0.6 K/UL (ref 0.3–1)
MONOCYTES NFR BLD: 13.1 % (ref 4–15)
NEUTROPHILS # BLD AUTO: 2.6 K/UL (ref 1.8–7.7)
NEUTROPHILS NFR BLD: 56.9 % (ref 38–73)
NITRITE UR QL STRIP: NEGATIVE
NRBC BLD-RTO: 0 /100 WBC
PH UR STRIP: 6 [PH] (ref 5–8)
PLATELET # BLD AUTO: 161 K/UL (ref 150–450)
PMV BLD AUTO: 10.5 FL (ref 9.2–12.9)
POTASSIUM SERPL-SCNC: 3.9 MMOL/L (ref 3.5–5.1)
PROT SERPL-MCNC: 6.3 G/DL (ref 6–8.4)
PROT UR QL STRIP: NEGATIVE
RBC # BLD AUTO: 3.13 M/UL (ref 4–5.4)
RBC #/AREA URNS HPF: 2 /HPF (ref 0–4)
SODIUM SERPL-SCNC: 146 MMOL/L (ref 136–145)
SP GR UR STRIP: 1.01 (ref 1–1.03)
SQUAMOUS #/AREA URNS HPF: 3 /HPF
TROPONIN I SERPL DL<=0.01 NG/ML-MCNC: 0.07 NG/ML (ref 0–0.03)
TROPONIN I SERPL DL<=0.01 NG/ML-MCNC: 0.07 NG/ML (ref 0–0.03)
URN SPEC COLLECT METH UR: ABNORMAL
UROBILINOGEN UR STRIP-ACNC: NEGATIVE EU/DL
WBC # BLD AUTO: 4.57 K/UL (ref 3.9–12.7)
WBC #/AREA URNS HPF: 20 /HPF (ref 0–5)

## 2025-02-02 PROCEDURE — 94799 UNLISTED PULMONARY SVC/PX: CPT

## 2025-02-02 PROCEDURE — 99285 EMERGENCY DEPT VISIT HI MDM: CPT | Mod: 25

## 2025-02-02 PROCEDURE — 84484 ASSAY OF TROPONIN QUANT: CPT | Mod: 91 | Performed by: EMERGENCY MEDICINE

## 2025-02-02 PROCEDURE — 73562 X-RAY EXAM OF KNEE 3: CPT | Mod: TC,50

## 2025-02-02 PROCEDURE — 99223 1ST HOSP IP/OBS HIGH 75: CPT | Mod: AI,95,, | Performed by: HOSPITALIST

## 2025-02-02 PROCEDURE — 73521 X-RAY EXAM HIPS BI 2 VIEWS: CPT | Mod: TC

## 2025-02-02 PROCEDURE — 72192 CT PELVIS W/O DYE: CPT | Mod: 26,,, | Performed by: RADIOLOGY

## 2025-02-02 PROCEDURE — 80053 COMPREHEN METABOLIC PANEL: CPT | Performed by: NURSE PRACTITIONER

## 2025-02-02 PROCEDURE — 99900035 HC TECH TIME PER 15 MIN (STAT)

## 2025-02-02 PROCEDURE — 25000003 PHARM REV CODE 250: Performed by: STUDENT IN AN ORGANIZED HEALTH CARE EDUCATION/TRAINING PROGRAM

## 2025-02-02 PROCEDURE — 99406 BEHAV CHNG SMOKING 3-10 MIN: CPT

## 2025-02-02 PROCEDURE — G0378 HOSPITAL OBSERVATION PER HR: HCPCS

## 2025-02-02 PROCEDURE — 84484 ASSAY OF TROPONIN QUANT: CPT | Performed by: NURSE PRACTITIONER

## 2025-02-02 PROCEDURE — 83880 ASSAY OF NATRIURETIC PEPTIDE: CPT | Performed by: NURSE PRACTITIONER

## 2025-02-02 PROCEDURE — 25000003 PHARM REV CODE 250: Performed by: HOSPITALIST

## 2025-02-02 PROCEDURE — 94761 N-INVAS EAR/PLS OXIMETRY MLT: CPT

## 2025-02-02 PROCEDURE — 72192 CT PELVIS W/O DYE: CPT | Mod: TC

## 2025-02-02 PROCEDURE — 73521 X-RAY EXAM HIPS BI 2 VIEWS: CPT | Mod: 26,,, | Performed by: RADIOLOGY

## 2025-02-02 PROCEDURE — 36415 COLL VENOUS BLD VENIPUNCTURE: CPT | Performed by: NURSE PRACTITIONER

## 2025-02-02 PROCEDURE — 87086 URINE CULTURE/COLONY COUNT: CPT | Performed by: NURSE PRACTITIONER

## 2025-02-02 PROCEDURE — 85025 COMPLETE CBC W/AUTO DIFF WBC: CPT | Performed by: NURSE PRACTITIONER

## 2025-02-02 PROCEDURE — 73562 X-RAY EXAM OF KNEE 3: CPT | Mod: 26,50,, | Performed by: RADIOLOGY

## 2025-02-02 PROCEDURE — 81000 URINALYSIS NONAUTO W/SCOPE: CPT | Performed by: NURSE PRACTITIONER

## 2025-02-02 RX ORDER — ONDANSETRON 4 MG/1
4 TABLET, ORALLY DISINTEGRATING ORAL EVERY 6 HOURS PRN
Status: DISCONTINUED | OUTPATIENT
Start: 2025-02-02 | End: 2025-02-07 | Stop reason: HOSPADM

## 2025-02-02 RX ORDER — ENOXAPARIN SODIUM 100 MG/ML
30 INJECTION SUBCUTANEOUS EVERY 24 HOURS
Status: DISCONTINUED | OUTPATIENT
Start: 2025-02-02 | End: 2025-02-04

## 2025-02-02 RX ORDER — CARVEDILOL 12.5 MG/1
12.5 TABLET ORAL 2 TIMES DAILY WITH MEALS
Status: DISCONTINUED | OUTPATIENT
Start: 2025-02-03 | End: 2025-02-07 | Stop reason: HOSPADM

## 2025-02-02 RX ORDER — LEVOTHYROXINE SODIUM 88 UG/1
88 TABLET ORAL
Status: DISCONTINUED | OUTPATIENT
Start: 2025-02-03 | End: 2025-02-07 | Stop reason: HOSPADM

## 2025-02-02 RX ORDER — LOSARTAN POTASSIUM 25 MG/1
100 TABLET ORAL DAILY
Status: DISCONTINUED | OUTPATIENT
Start: 2025-02-03 | End: 2025-02-07 | Stop reason: HOSPADM

## 2025-02-02 RX ORDER — HYDROCODONE BITARTRATE AND ACETAMINOPHEN 5; 325 MG/1; MG/1
1 TABLET ORAL EVERY 4 HOURS PRN
Status: DISCONTINUED | OUTPATIENT
Start: 2025-02-02 | End: 2025-02-07 | Stop reason: HOSPADM

## 2025-02-02 RX ORDER — GLUCAGON 1 MG
1 KIT INJECTION
Status: DISCONTINUED | OUTPATIENT
Start: 2025-02-02 | End: 2025-02-07 | Stop reason: HOSPADM

## 2025-02-02 RX ORDER — IBUPROFEN 200 MG
16 TABLET ORAL
Status: DISCONTINUED | OUTPATIENT
Start: 2025-02-02 | End: 2025-02-07 | Stop reason: HOSPADM

## 2025-02-02 RX ORDER — ASPIRIN 81 MG/1
81 TABLET ORAL EVERY MORNING
Status: DISCONTINUED | OUTPATIENT
Start: 2025-02-03 | End: 2025-02-07 | Stop reason: HOSPADM

## 2025-02-02 RX ORDER — IBUPROFEN 200 MG
24 TABLET ORAL
Status: DISCONTINUED | OUTPATIENT
Start: 2025-02-02 | End: 2025-02-07 | Stop reason: HOSPADM

## 2025-02-02 RX ORDER — LOSARTAN POTASSIUM 25 MG/1
50 TABLET ORAL NIGHTLY
Status: DISCONTINUED | OUTPATIENT
Start: 2025-02-02 | End: 2025-02-07 | Stop reason: HOSPADM

## 2025-02-02 RX ORDER — NALOXONE HCL 0.4 MG/ML
0.02 VIAL (ML) INJECTION
Status: DISCONTINUED | OUTPATIENT
Start: 2025-02-02 | End: 2025-02-07 | Stop reason: HOSPADM

## 2025-02-02 RX ORDER — IPRATROPIUM BROMIDE AND ALBUTEROL SULFATE 2.5; .5 MG/3ML; MG/3ML
3 SOLUTION RESPIRATORY (INHALATION) EVERY 4 HOURS PRN
Status: DISCONTINUED | OUTPATIENT
Start: 2025-02-02 | End: 2025-02-07 | Stop reason: HOSPADM

## 2025-02-02 RX ORDER — LIDOCAINE 50 MG/G
2 PATCH TOPICAL
Status: DISCONTINUED | OUTPATIENT
Start: 2025-02-02 | End: 2025-02-07 | Stop reason: HOSPADM

## 2025-02-02 RX ORDER — ACETAMINOPHEN 325 MG/1
650 TABLET ORAL EVERY 4 HOURS PRN
Status: DISCONTINUED | OUTPATIENT
Start: 2025-02-02 | End: 2025-02-07 | Stop reason: HOSPADM

## 2025-02-02 RX ORDER — ACETAMINOPHEN 500 MG
1000 TABLET ORAL
Status: COMPLETED | OUTPATIENT
Start: 2025-02-02 | End: 2025-02-02

## 2025-02-02 RX ORDER — SODIUM CHLORIDE 0.9 % (FLUSH) 0.9 %
10 SYRINGE (ML) INJECTION EVERY 12 HOURS PRN
Status: DISCONTINUED | OUTPATIENT
Start: 2025-02-02 | End: 2025-02-07 | Stop reason: HOSPADM

## 2025-02-02 RX ORDER — FUROSEMIDE 20 MG/1
40 TABLET ORAL DAILY
Status: DISCONTINUED | OUTPATIENT
Start: 2025-02-03 | End: 2025-02-07 | Stop reason: HOSPADM

## 2025-02-02 RX ADMIN — LIDOCAINE 2 PATCH: 700 PATCH TOPICAL at 11:02

## 2025-02-02 RX ADMIN — ACETAMINOPHEN 1000 MG: 500 TABLET ORAL at 04:02

## 2025-02-02 NOTE — ED PROVIDER NOTES
CHIEF COMPLAINT  Chief Complaint   Patient presents with    Joint Swelling     Right knee swelling x 1.5 weeks with decreased mobility due to same.  Patient denies direct injury to the area prior to the swelling started.  However, since the fall patient reports increased pain and swelling to the right knee with pain to buttocks.  Fall occurred approximately 9 days ago.       HISTORY OF PRESENT ILLNESS  Re Arciniega is a 89 y.o. female with PMH as below who presents to ER for evaluation of pain on buttocks and bilateral knee, worse on right knee. She states she had mechanical fall, she was trying to  something from floor, lost balance, fell on her buttocks. She was taking acetaminophen 1000 mg twice a day.  No other specific aggravating or relieving factors otherwise.      PAST MEDICAL HISTORY  Past Medical History:   Diagnosis Date    Anemia     CHF (congestive heart failure)     Constipation     Hypertension     Thyroid disease        CURRENT MEDICATIONS      Current Facility-Administered Medications:     acetaminophen tablet 1,000 mg, 1,000 mg, Oral, ED 1 Time, Grupo Brown MD    Current Outpatient Medications:     aspirin (ECOTRIN) 81 MG EC tablet, Take 81 mg by mouth every morning., Disp: , Rfl:     carvediloL (COREG) 12.5 MG tablet, Take 1 tablet (12.5 mg total) by mouth 2 (two) times daily with meals., Disp: 60 tablet, Rfl: 11    clobetasol 0.05% (TEMOVATE) 0.05 % Oint, APPLY  OINTMENT TOPICALLY TWICE DAILY TO  HEALS, Disp: 60 g, Rfl: 0    docusate sodium (COLACE) 50 MG capsule, Take by mouth 2 (two) times daily., Disp: , Rfl:     ferrous sulfate (FEOSOL) 325 mg (65 mg iron) Tab tablet, Take 1 tablet (325 mg total) by mouth once daily., Disp: 30 tablet, Rfl: 11    furosemide (LASIX) 40 MG tablet, Take 1 tablet (40 mg total) by mouth once daily., Disp: 30 tablet, Rfl: 11    IRON 325 mg (65 mg iron) Tab tablet, TAKE 1 TABLET BY MOUTH IN THE MORNING, Disp: , Rfl:     iron-vitamin C 100-250 mg,  ICAR-C, 100-250 mg Tab, Take 1 tablet by mouth once daily., Disp: 30 tablet, Rfl: 0    levothyroxine (SYNTHROID) 88 MCG tablet, Take 1 tablet (88 mcg total) by mouth before breakfast., Disp: 30 tablet, Rfl: 11    losartan (COZAAR) 100 MG tablet, Take 1 tablet (100 mg total) by mouth once daily., Disp: 30 tablet, Rfl: 11    losartan (COZAAR) 50 MG tablet, Take 1 tablet (50 mg total) by mouth every evening., Disp: 90 tablet, Rfl: 3    metoclopramide HCl (REGLAN) 10 MG tablet, 10 mg., Disp: , Rfl:     omeprazole (PRILOSEC) 40 MG capsule, Take 40 mg by mouth., Disp: , Rfl:     omeprazole 20 mg TbEC, Oral, Daily, 0 Refill(s), Maintenance, Disp: , Rfl:     polyethylene glycol (GLYCOLAX) 17 gram PwPk, Take 17 g by mouth once daily., Disp: 30 packet, Rfl: 0    spironolactone (ALDACTONE) 25 MG tablet, Take 1 tablet (25 mg total) by mouth once daily., Disp: 90 tablet, Rfl: 3    STOOL SOFTENER-STIMULANT LAXAT 8.6-50 mg per tablet, Take by mouth., Disp: , Rfl:     ALLERGIES    Review of patient's allergies indicates:  No Known Allergies    SURGICAL HISTORY    Past Surgical History:   Procedure Laterality Date    CHOLECYSTECTOMY      HYSTERECTOMY         SOCIAL HISTORY    Social History     Socioeconomic History    Marital status:    Tobacco Use    Smoking status: Never    Smokeless tobacco: Never   Substance and Sexual Activity    Alcohol use: No    Drug use: No    Sexual activity: Never     Social Drivers of Health     Financial Resource Strain: Low Risk  (4/12/2024)    Overall Financial Resource Strain (CARDIA)     Difficulty of Paying Living Expenses: Not hard at all   Food Insecurity: No Food Insecurity (4/12/2024)    Hunger Vital Sign     Worried About Running Out of Food in the Last Year: Never true     Ran Out of Food in the Last Year: Never true   Transportation Needs: No Transportation Needs (4/12/2024)    PRAPARE - Transportation     Lack of Transportation (Medical): No     Lack of Transportation (Non-Medical):  "No   Physical Activity: Inactive (4/12/2024)    Exercise Vital Sign     Days of Exercise per Week: 0 days     Minutes of Exercise per Session: 0 min   Stress: Stress Concern Present (4/12/2024)    [employer] [place] [employer] - Occupational Stress Questionnaire     Feeling of Stress : To some extent   Housing Stability: Low Risk  (4/12/2024)    Housing Stability Vital Sign     Unable to Pay for Housing in the Last Year: No     Number of Places Lived in the Last Year: 1     Unstable Housing in the Last Year: No       FAMILY HISTORY    No family history on file.    REVIEW OF SYSTEMS    Review of Systems   Musculoskeletal:  Positive for falls and joint pain.     All other systems reviewed and are negative    VITAL SIGNS:   /84 (BP Location: Left arm)   Pulse 73   Temp 99 °F (37.2 °C) (Oral)   Resp 16   Ht 5' 6" (1.676 m)   Wt 54.4 kg (120 lb)   SpO2 97%   BMI 19.37 kg/m²      Physical Exam  Constitutional:       Appearance: Normal appearance.   HENT:      Head: Normocephalic.   Cardiovascular:      Pulses:           Dorsalis pedis pulses are 1+ on the right side and 1+ on the left side.   Pulmonary:      Effort: Pulmonary effort is normal.   Musculoskeletal:      Right knee: Swelling and effusion present. Tenderness present over the patellar tendon.      Left knee: Tenderness present.      Right lower leg: Normal. No bony tenderness.      Left lower leg: Normal. No bony tenderness.      Right ankle: Normal pulse.      Left ankle: Normal pulse.      Right foot: No bony tenderness.      Left foot: No bony tenderness.   Neurological:      Mental Status: She is alert.       Vitals and nursing note reviewed.     LABS    Labs Reviewed   CBC W/ AUTO DIFFERENTIAL   COMPREHENSIVE METABOLIC PANEL   B-TYPE NATRIURETIC PEPTIDE   TROPONIN I   URINALYSIS, REFLEX TO URINE CULTURE         EKG        RADIOLOGY    X-Ray Hips Bilateral 2 View Inc AP Pelvis   Final Result      X-Ray Knee 3 View Bilateral   Final " Result            PROCEDURES    Procedures    Medications   acetaminophen tablet 1,000 mg (has no administration in time range)                Medical Decision Making  Re Arciniega is a 89 y.o. female with CHF, HTN, thyroid  who presents to ER for evaluation of pain on buttocks and bilateral knee, worse on right knee. She states she had mechanical fall, she was trying to  something from floor, lost balance, fell on her buttocks. She was taking acetaminophen 1000 mg twice a day. Pain worse with movement, denies LOC, head injury at the time of fall    Ddx: Fracture, strain, sprain, tendonitis, considered but less likely ICH    Patient is admitted to hospital for PT/OT eval  DR. Brown examined patient in person, assumed patient's care    Dr. Brown addendum  I personally made/approved the management plan for this patient and take responsibility for the patient management. I reviewed the NP/PA's documentation, agree with the NP/PA's assessment, and I had face to face time with the patient.     89-year-old with history of arthritis presenting with acute on chronic knee pain, right-greater-than-left, status post mechanical fall.  Unable to bear weight secondary to pain.  No obvious deformity, no leg length discrepancy or rotation. X-ray shows no fracture, or dislocation there is small/trace effusion bilateral knees.      Problems Addressed:  Bilateral hip pain: acute illness or injury  Effusion of right knee joint: acute illness or injury  Fall: acute illness or injury  Physical debility: acute illness or injury    Amount and/or Complexity of Data Reviewed  Labs: ordered. Decision-making details documented in ED Course.  Radiology: ordered. Decision-making details documented in ED Course.    Risk  OTC drugs.  Decision regarding hospitalization.           Discontinued Medications    No medications on file       New Prescriptions    No medications on file         DISPOSITION  Patient is admitted to hospital for  PT/OT eval, rehab        FINAL IMPRESSION    1. Physical debility    2. Fall    3. Effusion of right knee joint    4. Bilateral hip pain         Grupo Brown MD  02/02/25 1042

## 2025-02-02 NOTE — ED NOTES
See triage notes.     Pain:  Rated 9/10.     Psychosocial:  Patient is calm and cooperative.  Patients insight and judgement are appropriate to situation.  Appears clean, well maintained, with clothing appropriate to environment.  No evidence of delusions, hallucinations, or psychosis.     Neuro:  Eyes open spontaneously.  Awake, alert, oriented x 4.  Speech clear and appropriate.  Tolerating saliva secretions well.  Able to follow commands, demonstrating ability to actively and appropriately communicate within context of current conversation.  Symmetrical facial muscles.  Moving all extremities well with generalized weakness.  Adequate muscle tone present.  Movement is purposeful.        Airway:  Bilateral chest rise and fall.  RR regular and non-labored.         Circulatory:  Skin warm, dry, and pink.  Capillary refill/skin blanching less than 3 seconds to distal of 4 extremities.     Extremities:  No redness, heat.       Skin:  Intact.  Bruise noted to medial right knee.

## 2025-02-03 PROBLEM — R53.81 DEBILITY: Status: ACTIVE | Noted: 2025-02-03

## 2025-02-03 PROBLEM — E83.42 HYPOMAGNESEMIA: Status: ACTIVE | Noted: 2025-02-03

## 2025-02-03 PROBLEM — R79.89 ELEVATED TROPONIN: Status: ACTIVE | Noted: 2025-02-03

## 2025-02-03 LAB
ANION GAP SERPL CALC-SCNC: 11 MMOL/L (ref 8–16)
BASOPHILS # BLD AUTO: 0.02 K/UL (ref 0–0.2)
BASOPHILS NFR BLD: 0.5 % (ref 0–1.9)
BUN SERPL-MCNC: 29 MG/DL (ref 10–30)
CALCIUM SERPL-MCNC: 8.6 MG/DL (ref 8.7–10.5)
CHLORIDE SERPL-SCNC: 110 MMOL/L (ref 95–110)
CO2 SERPL-SCNC: 24 MMOL/L (ref 23–29)
CREAT SERPL-MCNC: 1 MG/DL (ref 0.5–1.4)
DIFFERENTIAL METHOD BLD: ABNORMAL
EOSINOPHIL # BLD AUTO: 0.2 K/UL (ref 0–0.5)
EOSINOPHIL NFR BLD: 3.7 % (ref 0–8)
ERYTHROCYTE [DISTWIDTH] IN BLOOD BY AUTOMATED COUNT: 14 % (ref 11.5–14.5)
EST. GFR  (NO RACE VARIABLE): 52.2 ML/MIN/1.73 M^2
GLUCOSE SERPL-MCNC: 76 MG/DL (ref 70–110)
HCT VFR BLD AUTO: 32.2 % (ref 37–48.5)
HGB BLD-MCNC: 10.2 G/DL (ref 12–16)
IMM GRANULOCYTES # BLD AUTO: 0.01 K/UL (ref 0–0.04)
IMM GRANULOCYTES NFR BLD AUTO: 0.2 % (ref 0–0.5)
LYMPHOCYTES # BLD AUTO: 1.3 K/UL (ref 1–4.8)
LYMPHOCYTES NFR BLD: 29.3 % (ref 18–48)
MAGNESIUM SERPL-MCNC: 1.4 MG/DL (ref 1.6–2.6)
MCH RBC QN AUTO: 31.5 PG (ref 27–31)
MCHC RBC AUTO-ENTMCNC: 31.7 G/DL (ref 32–36)
MCV RBC AUTO: 99 FL (ref 82–98)
MONOCYTES # BLD AUTO: 0.6 K/UL (ref 0.3–1)
MONOCYTES NFR BLD: 12.9 % (ref 4–15)
NEUTROPHILS # BLD AUTO: 2.3 K/UL (ref 1.8–7.7)
NEUTROPHILS NFR BLD: 53.4 % (ref 38–73)
NRBC BLD-RTO: 0 /100 WBC
PHOSPHATE SERPL-MCNC: 3.1 MG/DL (ref 2.7–4.5)
PLATELET # BLD AUTO: 180 K/UL (ref 150–450)
PMV BLD AUTO: 10.6 FL (ref 9.2–12.9)
POTASSIUM SERPL-SCNC: 3.6 MMOL/L (ref 3.5–5.1)
RBC # BLD AUTO: 3.24 M/UL (ref 4–5.4)
SODIUM SERPL-SCNC: 145 MMOL/L (ref 136–145)
TROPONIN I SERPL DL<=0.01 NG/ML-MCNC: 0.06 NG/ML (ref 0–0.03)
WBC # BLD AUTO: 4.33 K/UL (ref 3.9–12.7)

## 2025-02-03 PROCEDURE — 25000003 PHARM REV CODE 250: Performed by: HOSPITALIST

## 2025-02-03 PROCEDURE — 96372 THER/PROPH/DIAG INJ SC/IM: CPT | Performed by: HOSPITALIST

## 2025-02-03 PROCEDURE — 84484 ASSAY OF TROPONIN QUANT: CPT | Performed by: HOSPITALIST

## 2025-02-03 PROCEDURE — 93010 ELECTROCARDIOGRAM REPORT: CPT | Mod: ,,, | Performed by: INTERNAL MEDICINE

## 2025-02-03 PROCEDURE — 63600175 PHARM REV CODE 636 W HCPCS: Performed by: HOSPITALIST

## 2025-02-03 PROCEDURE — 99900035 HC TECH TIME PER 15 MIN (STAT)

## 2025-02-03 PROCEDURE — 83735 ASSAY OF MAGNESIUM: CPT | Performed by: HOSPITALIST

## 2025-02-03 PROCEDURE — 63600175 PHARM REV CODE 636 W HCPCS: Performed by: STUDENT IN AN ORGANIZED HEALTH CARE EDUCATION/TRAINING PROGRAM

## 2025-02-03 PROCEDURE — 93005 ELECTROCARDIOGRAM TRACING: CPT

## 2025-02-03 PROCEDURE — 85025 COMPLETE CBC W/AUTO DIFF WBC: CPT | Performed by: HOSPITALIST

## 2025-02-03 PROCEDURE — 97162 PT EVAL MOD COMPLEX 30 MIN: CPT

## 2025-02-03 PROCEDURE — 80048 BASIC METABOLIC PNL TOTAL CA: CPT | Performed by: HOSPITALIST

## 2025-02-03 PROCEDURE — 94761 N-INVAS EAR/PLS OXIMETRY MLT: CPT

## 2025-02-03 PROCEDURE — 21400001 HC TELEMETRY ROOM

## 2025-02-03 PROCEDURE — 99233 SBSQ HOSP IP/OBS HIGH 50: CPT | Mod: ,,, | Performed by: STUDENT IN AN ORGANIZED HEALTH CARE EDUCATION/TRAINING PROGRAM

## 2025-02-03 PROCEDURE — 36415 COLL VENOUS BLD VENIPUNCTURE: CPT | Performed by: HOSPITALIST

## 2025-02-03 PROCEDURE — 84100 ASSAY OF PHOSPHORUS: CPT | Performed by: HOSPITALIST

## 2025-02-03 RX ORDER — MAGNESIUM SULFATE HEPTAHYDRATE 40 MG/ML
2 INJECTION, SOLUTION INTRAVENOUS ONCE
Status: COMPLETED | OUTPATIENT
Start: 2025-02-03 | End: 2025-02-03

## 2025-02-03 RX ADMIN — LOSARTAN POTASSIUM 100 MG: 25 TABLET, FILM COATED ORAL at 07:02

## 2025-02-03 RX ADMIN — CARVEDILOL 12.5 MG: 12.5 TABLET, FILM COATED ORAL at 07:02

## 2025-02-03 RX ADMIN — LEVOTHYROXINE SODIUM 88 MCG: 88 TABLET ORAL at 05:02

## 2025-02-03 RX ADMIN — ACETAMINOPHEN 650 MG: 325 TABLET ORAL at 09:02

## 2025-02-03 RX ADMIN — FUROSEMIDE 40 MG: 20 TABLET ORAL at 07:02

## 2025-02-03 RX ADMIN — ACETAMINOPHEN 650 MG: 325 TABLET ORAL at 03:02

## 2025-02-03 RX ADMIN — LOSARTAN POTASSIUM 50 MG: 25 TABLET, FILM COATED ORAL at 09:02

## 2025-02-03 RX ADMIN — ENOXAPARIN SODIUM 30 MG: 30 INJECTION SUBCUTANEOUS at 04:02

## 2025-02-03 RX ADMIN — ASPIRIN 81 MG: 81 TABLET, COATED ORAL at 07:02

## 2025-02-03 RX ADMIN — MAGNESIUM SULFATE HEPTAHYDRATE 2 G: 40 INJECTION, SOLUTION INTRAVENOUS at 04:02

## 2025-02-03 RX ADMIN — CARVEDILOL 12.5 MG: 12.5 TABLET, FILM COATED ORAL at 04:02

## 2025-02-03 RX ADMIN — LIDOCAINE 2 PATCH: 700 PATCH TOPICAL at 09:02

## 2025-02-03 NOTE — PLAN OF CARE
02/03/25 1442   OWEN Message   Medicare Outpatient and Observation Notification regarding financial responsibility Given to patient/caregiver;Explained to patient/caregiver;Signed/date by patient/caregiver   Date OWEN was signed 02/03/25   Time OWEN was signed 1430     Placed in folder

## 2025-02-03 NOTE — ASSESSMENT & PLAN NOTE
Creatine stable for now. BMP reviewed- noted Estimated Creatinine Clearance: 31.1 mL/min (based on SCr of 1 mg/dL). according to latest data. Based on current GFR, CKD stage is stage 3 - GFR 30-59.  Monitor UOP and serial BMP and adjust therapy as needed. Renally dose meds. Avoid nephrotoxic medications and procedures.

## 2025-02-03 NOTE — SUBJECTIVE & OBJECTIVE
Past Medical History:   Diagnosis Date    Anemia     CHF (congestive heart failure)     Constipation     Hypertension     Thyroid disease        Past Surgical History:   Procedure Laterality Date    CHOLECYSTECTOMY      HYSTERECTOMY         Review of patient's allergies indicates:  No Known Allergies    No current facility-administered medications on file prior to encounter.     Current Outpatient Medications on File Prior to Encounter   Medication Sig    iron-vitamin C 100-250 mg, ICAR-C, 100-250 mg Tab Take 1 tablet by mouth once daily.    aspirin (ECOTRIN) 81 MG EC tablet Take 81 mg by mouth every morning.    carvediloL (COREG) 12.5 MG tablet Take 1 tablet (12.5 mg total) by mouth 2 (two) times daily with meals.    clobetasol 0.05% (TEMOVATE) 0.05 % Oint APPLY  OINTMENT TOPICALLY TWICE DAILY TO  HEALS    docusate sodium (COLACE) 50 MG capsule Take by mouth 2 (two) times daily.    ferrous sulfate (FEOSOL) 325 mg (65 mg iron) Tab tablet Take 1 tablet (325 mg total) by mouth once daily.    furosemide (LASIX) 40 MG tablet Take 1 tablet (40 mg total) by mouth once daily.    IRON 325 mg (65 mg iron) Tab tablet TAKE 1 TABLET BY MOUTH IN THE MORNING    levothyroxine (SYNTHROID) 88 MCG tablet Take 1 tablet (88 mcg total) by mouth before breakfast.    losartan (COZAAR) 100 MG tablet Take 1 tablet (100 mg total) by mouth once daily.    losartan (COZAAR) 50 MG tablet Take 1 tablet (50 mg total) by mouth every evening.    metoclopramide HCl (REGLAN) 10 MG tablet 10 mg.    omeprazole (PRILOSEC) 40 MG capsule Take 40 mg by mouth.    omeprazole 20 mg TbEC Oral, Daily, 0 Refill(s), Maintenance    polyethylene glycol (GLYCOLAX) 17 gram PwPk Take 17 g by mouth once daily.    spironolactone (ALDACTONE) 25 MG tablet Take 1 tablet (25 mg total) by mouth once daily.    STOOL SOFTENER-STIMULANT LAXAT 8.6-50 mg per tablet Take by mouth.     Family History    None       Tobacco Use    Smoking status: Never    Smokeless tobacco: Never    Substance and Sexual Activity    Alcohol use: No    Drug use: No    Sexual activity: Never     Review of Systems   Constitutional:  Positive for activity change.   Respiratory:  Negative for shortness of breath.    Cardiovascular:  Negative for chest pain.   Musculoskeletal:         Per HPI   Neurological:  Negative for dizziness.     Objective:     Vital Signs (Most Recent):  Temp: 99 °F (37.2 °C) (02/02/25 1438)  Pulse: 69 (02/02/25 1733)  Resp: 18 (02/02/25 1733)  BP: 123/62 (02/02/25 1733)  SpO2: 100 % (02/02/25 1733) Vital Signs (24h Range):  Temp:  [99 °F (37.2 °C)] 99 °F (37.2 °C)  Pulse:  [69-73] 69  Resp:  [16-18] 18  SpO2:  [97 %-100 %] 100 %  BP: (123-142)/(62-84) 123/62     Weight: 54.4 kg (120 lb)  Body mass index is 19.37 kg/m².     Physical Exam  Constitutional:       General: She is not in acute distress.  Cardiovascular:      Rate and Rhythm: Normal rate.   Pulmonary:      Effort: Pulmonary effort is normal. No respiratory distress.   Musculoskeletal:      Comments: Right knee wrapped   Neurological:      Mental Status: She is alert.                Significant Labs: All pertinent labs within the past 24 hours have been reviewed.    Significant Imaging: I have reviewed all pertinent imaging results/findings within the past 24 hours.

## 2025-02-03 NOTE — PROGRESS NOTES
Pharmacist Renal Dose Adjustment Note    Re Arciniega is a 89 y.o. female being treated with the medication lovenox    Patient Data:    Vital Signs (Most Recent):  Temp: 99 °F (37.2 °C) (02/02/25 1438)  Pulse: 69 (02/02/25 1733)  Resp: 18 (02/02/25 1733)  BP: 123/62 (02/02/25 1733)  SpO2: 100 % (02/02/25 1733) Vital Signs (72h Range):  Temp:  [99 °F (37.2 °C)]   Pulse:  [69-73]   Resp:  [16-18]   BP: (123-142)/(62-84)   SpO2:  [97 %-100 %]      Recent Labs   Lab 02/02/25  1655   CREATININE 1.3     Serum creatinine: 1.3 mg/dL 02/02/25 1655  Estimated creatinine clearance: 22.7 mL/min    Medication:lovenox dose: 40mg frequency daily will be changed to medication:lovenox dose:30mg frequency:daily    Pharmacist's Name: Olvin Cox

## 2025-02-03 NOTE — PLAN OF CARE
[place] - [facility] Surg  Initial Discharge Assessment       Primary Care Provider: Anthony Emanuel MD    Admission Diagnosis: Fall [W19.XXXA]  Bilateral hip pain [M25.551, M25.552]  Effusion of right knee joint [M25.461]  Physical debility [R53.81]  Chest pain [R07.9]    Admission Date: 2/2/2025  Expected Discharge Date: 2/4/2025    DC assessment completed with patient daughter at bedside. Verified information on facesheet as correct. Pt lives at listed address with .... Reports she has help if needed from  son that lives with her.  PCP is Jenae reports last apt was unsure Pharmacy is  [facility].Denies hh/hd/outpt services. DME has rollator and wheelchair. Reports being independent with activities.  Reports taking home medications as prescribed and can currently afford them. Verified insurance on file. Denies recent inpt stay in last 30 days.  DC plan is.Home health or SNF, possible looking at [facility] care.   I spoke with family about the plan of care for her at DC.       Transition of Care Barriers: None    Payor: MEDICARE / Plan: MEDICARE PART A & B / Product Type: Government /     Extended Emergency Contact Information  Primary Emergency Contact: [place]  Mobile Phone: [phone]  Relation: Daughter  Preferred language: English   needed? No  Secondary Emergency Contact: rafi crespo  Mobile Phone: [phone]  Relation: Daughter    Discharge Plan A: Home Health  Discharge Plan B: Skilled Nursing Facility      [facility], [place] - [address] [facility] [address]  [address] [place] [id number]  Phone: [phone] Fax: [phone]      Initial Assessment (most recent)       Adult Discharge Assessment - 02/03/25 1443          Discharge Assessment    Assessment Type Discharge Planning Assessment     Confirmed/corrected address, phone number and insurance Yes     Confirmed Demographics Correct on Facesheet     Source of Information patient;family     If unable to respond/provide  information was family/caregiver contacted? Yes     Contact Name/Number DaughterNahomy [phone]     When was your last doctors appointment? --   unsure    Does patient/caregiver understand observation status Yes     Communicated ART with patient/caregiver Yes     Reason For Admission fall     People in Home child(mehdi), adult     Do you expect to return to your current living situation? No     Do you have help at home or someone to help you manage your care at home? Yes     Who are your caregiver(s) and their phone number(s)? son and 3 daughters, see additional plan of care notes     Prior to hospitilization cognitive status: Alert/Oriented;No Deficits     Current cognitive status: Alert/Oriented;No Deficits     Walking or Climbing Stairs Difficulty yes     Walking or Climbing Stairs ambulation difficulty, requires equipment     Mobility Management Uses a rollator and has a wheelchair     Dressing/Bathing Difficulty yes     Dressing/Bathing bathing difficulty, assistance 1 person     Dressing/Bathing Management requires 1 person to assist her     Home Accessibility wheelchair accessible   has 3 -4 steps to enter, needs a ramp. uses WC once inside home    Home Layout Able to live on 1st floor     Equipment Currently Used at Home wheelchair;rollator     Readmission within 30 days? No     Patient currently being followed by outpatient case management? No     Do you currently have service(s) that help you manage your care at home? No     Do you take prescription medications? Yes     Do you have prescription coverage? Yes     Coverage [employer] plan per daughter     Do you have any problems affording any of your prescribed medications? No     Is the patient taking medications as prescribed? yes     Who is going to help you get home at discharge? Daughter     How do you get to doctors appointments? family or friend will provide     Are you on dialysis? No     Do you take coumadin? No     Discharge Plan A Home  Health     Discharge Plan B Skilled Nursing Facility     DME Needed Upon Discharge  bedside commode;raised toilet     Discharge Plan discussed with: Adult children     Transition of Care Barriers None

## 2025-02-03 NOTE — ASSESSMENT & PLAN NOTE
Patient with Chronic debility due to age-related physical debility. The patient's latest AMPAC (Activity Measure for Post Acute Care) Score is listed below.    AM-PAC Score - How much help does the patient need for each activity listed  Basic Mobility Total Score: 11  Turning over in bed (including adjusting bedclothes, sheets and blankets)?: A little  Sitting down on and standing up from a chair with arms (e.g., wheelchair, bedside commode, etc.): A lot  Moving from lying on back to sitting on the side of the bed?: A lot  Moving to and from a bed to a chair (including a wheelchair)?: A lot  Need to walk in hospital room?: Unable  Climbing 3-5 steps with a railing?: Unable    Plan  - Progressive mobility protocol initated  - PT/OT consulted  - Fall precautions in place

## 2025-02-03 NOTE — ASSESSMENT & PLAN NOTE
Anemia is likely due to chronic disease due to Chronic Kidney Disease. Most recent hemoglobin and hematocrit are listed below.  Recent Labs     02/02/25  1655 02/03/25  0439   HGB 10.1* 10.2*   HCT 30.6* 32.2*     Plan  - Monitor serial CBC: Daily  - Transfuse PRBC if patient becomes hemodynamically unstable, symptomatic or H/H drops below 7/21.  - Patient has not received any PRBC transfusions to date  - Patient's anemia is currently stable

## 2025-02-03 NOTE — ED PROVIDER NOTES
Encounter Date: 2/2/2025       History     Chief Complaint   Patient presents with    Joint Swelling     Right knee swelling x 1.5 weeks with decreased mobility due to same.  Patient denies direct injury to the area prior to the swelling started.  However, since the fall patient reports increased pain and swelling to the right knee with pain to buttocks.  Fall occurred approximately 9 days ago.     HPI  Review of patient's allergies indicates:  No Known Allergies  Past Medical History:   Diagnosis Date    Anemia     CHF (congestive heart failure)     Constipation     Hypertension     Thyroid disease      Past Surgical History:   Procedure Laterality Date    CHOLECYSTECTOMY      HYSTERECTOMY       No family history on file.  Social History     Tobacco Use    Smoking status: Never    Smokeless tobacco: Never   Substance Use Topics    Alcohol use: No    Drug use: No     Review of Systems    Physical Exam     Initial Vitals [02/02/25 1438]   BP Pulse Resp Temp SpO2   131/84 73 16 99 °F (37.2 °C) 97 %      MAP       --         Physical Exam    ED Course   Procedures  Labs Reviewed   CBC W/ AUTO DIFFERENTIAL - Abnormal       Result Value    WBC 4.57      RBC 3.13 (*)     Hemoglobin 10.1 (*)     Hematocrit 30.6 (*)     MCV 98      MCH 32.3 (*)     MCHC 33.0      RDW 14.0      Platelets 161      MPV 10.5      Immature Granulocytes 0.4      Gran # (ANC) 2.6      Immature Grans (Abs) 0.02      Lymph # 1.2      Mono # 0.6      Eos # 0.1      Baso # 0.03      nRBC 0      Gran % 56.9      Lymph % 25.8      Mono % 13.1      Eosinophil % 3.1      Basophil % 0.7      Differential Method Automated     COMPREHENSIVE METABOLIC PANEL - Abnormal    Sodium 146 (*)     Potassium 3.9      Chloride 110      CO2 24      Glucose 95      BUN 30      Creatinine 1.3      Calcium 8.4 (*)     Total Protein 6.3      Albumin 3.2 (*)     Total Bilirubin 0.3      Alkaline Phosphatase 71      AST 15      ALT 6 (*)     eGFR 38.1 (*)      Anion Gap 12     B-TYPE NATRIURETIC PEPTIDE - Abnormal     (*)    TROPONIN I - Abnormal    Troponin I 0.066 (*)    URINALYSIS, REFLEX TO URINE CULTURE          Imaging Results              CT Pelvis Without Contrast (Final result)  Result time 02/02/25 17:40:01      Final result by Griffin Russell MD (02/02/25 17:40:01)                   Impression:      Bones are profoundly osteopenic.  This limits sensitivity for acute nondisplaced fractures. Within constraints, no detectable acute fracture involving the hips. Age indeterminate minimally displaced fracture involving the distal sacrum/proximal coccyx.  Correlate with clinical findings      Electronically signed by: Griffin Russell  Date:    02/02/2025  Time:    17:40               Narrative:    EXAMINATION:  CT PELVIS WITHOUT CONTRAST    CLINICAL HISTORY:  Pelvic trauma;    TECHNIQUE:  CT pelvis without contrast.  Coronal and sagittal reformatted images were obtained    COMPARISON:  None    FINDINGS:  Bones are profoundly osteopenic.  This limits sensitivity for acute nondisplaced fractures.  Within constraints, no detectable acute fracture involving the hips.  Age indeterminate minimally displaced fracture involving the distal sacrum/proximal coccyx.  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  Advanced pubic symphysis degeneration with chondrocalcinosis.                                       X-Ray Hips Bilateral 2 View Inc AP Pelvis (Final result)  Result time 02/02/25 16:10:03   Procedure changed from X-Ray Pelvis 3 View inc Hip 2 view Bilat     Final result by Carlos Velez MD (02/02/25 16:10:03)                   Narrative:    EXAMINATION:  XR HIPS BILATERAL 2 VIEW INCL AP PELVIS    CLINICAL HISTORY:  fall;fall;    TECHNIQUE:  AP view of the pelvis and frogleg lateral views of both hips were performed.    COMPARISON:  02/12/2009    FINDINGS:  Bowel  contents overlie and partially obscure the lower lumbar spine and sacrum, limiting characterization.    No displaced fracture allowing for severe degree of osteopenia.    Partially imaged lower lumbar degenerative change.  Mild degenerative change of both hip joints.    Atherosclerosis.      Electronically signed by: Carlos Velez  Date:    02/02/2025  Time:    16:10                                     X-Ray Knee 3 View Bilateral (Final result)  Result time 02/02/25 16:10:49      Final result by Carlos Velez MD (02/02/25 16:10:49)                   Narrative:    EXAMINATION:  XR KNEE 3 VIEW BILATERAL    CLINICAL HISTORY:  Unspecified fall, initial encounter    TECHNIQUE:  AP, lateral, and Merchant views of both knees were performed.    COMPARISON:  None    FINDINGS:  No displaced fracture allowing for severe degree of osteopenia.  No malalignment.  Mild tricompartmental degenerative change of each knee with chondrocalcinosis.  Atherosclerosis.  Small right and trace left knee joint effusion.      Electronically signed by: Carlos Velez  Date:    02/02/2025  Time:    16:10                                     Medications   acetaminophen tablet 1,000 mg (1,000 mg Oral Given 2/2/25 4904)     Medical Decision Making  Amount and/or Complexity of Data Reviewed  Labs: ordered.  Radiology: ordered.    Risk  OTC drugs.                                      Clinical Impression:  Final diagnoses:  [W19.XXXA] Fall  [R53.81] Physical debility (Primary)  [M25.461] Effusion of right knee joint  [M25.551, M25.552] Bilateral hip pain

## 2025-02-03 NOTE — SUBJECTIVE & OBJECTIVE
Interval History: NAEON. Ortho consulted. Recommends rest, ice, elevation, compression. Outpatient follow up. PT/OT recommending SNF. Patient having a considerable amount of pain but none of this is acute.    Review of Systems   All other systems reviewed and are negative.    Objective:     Vital Signs (Most Recent):  Temp: 97.9 °F (36.6 °C) (02/03/25 1128)  Pulse: 65 (02/03/25 1200)  Resp: 16 (02/03/25 1128)  BP: 135/62 (02/03/25 1128)  SpO2: 97 % (02/03/25 1128) Vital Signs (24h Range):  Temp:  [97.8 °F (36.6 °C)-98.8 °F (37.1 °C)] 97.9 °F (36.6 °C)  Pulse:  [59-78] 65  Resp:  [16-20] 16  SpO2:  [96 %-100 %] 97 %  BP: (123-173)/(62-79) 135/62     Weight: 57.2 kg (126 lb 1.7 oz)  Body mass index is 20.35 kg/m².    Intake/Output Summary (Last 24 hours) at 2/3/2025 9425  Last data filed at 2/3/2025 0852  Gross per 24 hour   Intake 480 ml   Output 350 ml   Net 130 ml         Physical Exam      Vitals reviewed  General: NAD, thin, frail  Head: NC/AT  Eyes: EOMI, KATHERINE  Cardiovascular: Pulses intact distally, Regular Rate   Pulmonary: Normal Respiratory Rate, No respiratory distress  Gi: Soft, Non-tender  Extremities: Warm, No edema present  Skin: Warm, dry  Neuro: Alert, Oriented x3, No focal Deficit  Psych: Appropriate mood and affect      Significant Labs: All pertinent labs within the past 24 hours have been reviewed.    Significant Imaging: I have reviewed all pertinent imaging results/findings within the past 24 hours.

## 2025-02-03 NOTE — ASSESSMENT & PLAN NOTE
S/P fall over a week ago and CT also noted: Age indeterminate minimally displaced fracture involving the distal sacrum/proximal coccyx   Admit for pain control and PT; possibly needing SNF placement  Ortho consult to evaluate for possible R knee aspiration   Pain control with tylenol, hydrocodone, and lidocaine patch   PT   Fall precautions

## 2025-02-03 NOTE — NURSING
Plan of care reviewed with pt. ASHA throughout shift. Low Sodium diet. Purewick in place. Up to bedside commode.Mag replaced.  Continue to Monitor Labs. VSS. Continuous telemetry monitor maintained. Safety maintained. Will continue to monitor. No complaints at this time.

## 2025-02-03 NOTE — ASSESSMENT & PLAN NOTE
Echocardiogram with evidence of aortic stenosis that is severe . The patient's most recent echocardiogram result is listed below.     No echocardiogram results found for the past 12 months

## 2025-02-03 NOTE — ASSESSMENT & PLAN NOTE
Patient has Abnormal Magnesium: hypomagnesemia. Will continue to monitor electrolytes closely. Will replace the affected electrolytes and repeat labs to be done after interventions completed. The patient's magnesium results have been reviewed and are listed below.  Recent Labs   Lab 02/03/25  0439   MG 1.4*

## 2025-02-03 NOTE — H&P
[facility]  History & Physical    Patient Name: Re Arciniega  MRN: [id number]  Admission Date: 2/2/2025  Attending Physician: Michel Mackay MD   Primary Care Provider: Anthony Emanuel MD         Patient information was obtained from patient, relative(s), and ER records.       Subjective:     Principal Problem:Right knee pain    Chief Complaint:   Chief Complaint   Patient presents with    Joint Swelling     Right knee swelling x 1.5 weeks with decreased mobility due to same.  Patient denies direct injury to the area prior to the swelling started.  However, since the fall patient reports increased pain and swelling to the right knee with pain to buttocks.  Fall occurred approximately 9 days ago.        HPI: The patient is a 89 y/o female with PMH of HTN, hypothyroidism, and anemia who presents with right knee pain and swelling. History is primarily obatined from patient's daughter and grand daughter. The patient fell about a week and a half ago. Fall was unwitnessed. She uses a rollator at home and fell down after leaning over to grab something. She does report pain in the buttock and knee. Family requesting admit for pain control and ortho eval and patient and family amenable to placement for PT of needed. Pain controlled and no other complaints at this time.     Past Medical History:   Diagnosis Date    Anemia     CHF (congestive heart failure)     Constipation     Hypertension     Thyroid disease        Past Surgical History:   Procedure Laterality Date    CHOLECYSTECTOMY      HYSTERECTOMY         Review of patient's allergies indicates:  No Known Allergies    No current facility-administered medications on file prior to encounter.     Current Outpatient Medications on File Prior to Encounter   Medication Sig    iron-vitamin C 100-250 mg, ICAR-C, 100-250 mg Tab Take 1 tablet by mouth once daily.    aspirin (ECOTRIN) 81 MG EC tablet Take 81 mg by mouth every morning.     carvediloL (COREG) 12.5 MG tablet Take 1 tablet (12.5 mg total) by mouth 2 (two) times daily with meals.    clobetasol 0.05% (TEMOVATE) 0.05 % Oint APPLY  OINTMENT TOPICALLY TWICE DAILY TO  HEALS    docusate sodium (COLACE) 50 MG capsule Take by mouth 2 (two) times daily.    ferrous sulfate (FEOSOL) 325 mg (65 mg iron) Tab tablet Take 1 tablet (325 mg total) by mouth once daily.    furosemide (LASIX) 40 MG tablet Take 1 tablet (40 mg total) by mouth once daily.    IRON 325 mg (65 mg iron) Tab tablet TAKE 1 TABLET BY MOUTH IN THE MORNING    levothyroxine (SYNTHROID) 88 MCG tablet Take 1 tablet (88 mcg total) by mouth before breakfast.    losartan (COZAAR) 100 MG tablet Take 1 tablet (100 mg total) by mouth once daily.    losartan (COZAAR) 50 MG tablet Take 1 tablet (50 mg total) by mouth every evening.    metoclopramide HCl (REGLAN) 10 MG tablet 10 mg.    omeprazole (PRILOSEC) 40 MG capsule Take 40 mg by mouth.    omeprazole 20 mg TbEC Oral, Daily, 0 Refill(s), Maintenance    polyethylene glycol (GLYCOLAX) 17 gram PwPk Take 17 g by mouth once daily.    spironolactone (ALDACTONE) 25 MG tablet Take 1 tablet (25 mg total) by mouth once daily.    STOOL SOFTENER-STIMULANT LAXAT 8.6-50 mg per tablet Take by mouth.     Family History    None       Tobacco Use    Smoking status: Never    Smokeless tobacco: Never   Substance and Sexual Activity    Alcohol use: No    Drug use: No    Sexual activity: Never     Review of Systems   Constitutional:  Positive for activity change.   Respiratory:  Negative for shortness of breath.    Cardiovascular:  Negative for chest pain.   Musculoskeletal:         Per HPI   Neurological:  Negative for dizziness.     Objective:     Vital Signs (Most Recent):  Temp: 99 °F (37.2 °C) (02/02/25 1438)  Pulse: 69 (02/02/25 1733)  Resp: 18 (02/02/25 1733)  BP: 123/62 (02/02/25 1733)  SpO2: 100 % (02/02/25 1733) Vital Signs (24h Range):  Temp:  [99 °F (37.2 °C)] 99 °F (37.2 °C)  Pulse:  [69-73] 69  Resp:   [16-18] 18  SpO2:  [97 %-100 %] 100 %  BP: (123-142)/(62-84) 123/62     Weight: 54.4 kg (120 lb)  Body mass index is 19.37 kg/m².     Physical Exam  Constitutional:       General: She is not in acute distress.  Cardiovascular:      Rate and Rhythm: Normal rate.   Pulmonary:      Effort: Pulmonary effort is normal. No respiratory distress.   Musculoskeletal:      Comments: Right knee wrapped   Neurological:      Mental Status: She is alert.                Significant Labs: All pertinent labs within the past 24 hours have been reviewed.    Significant Imaging: I have reviewed all pertinent imaging results/findings within the past 24 hours.  Assessment/Plan:     * Right knee pain  S/P fall over a week ago and CT also noted: Age indeterminate minimally displaced fracture involving the distal sacrum/proximal coccyx   Admit for pain control and PT; possibly needing SNF placement  Ortho consult to evaluate for possible R knee aspiration   Pain control with tylenol, hydrocodone, and lidocaine patch   PT   Fall precautions       Hypothyroidism  Continue levothyroxine       Hypertension, greater than 10 years  Continue carvedilol, furosemide, and losartan   Hold spironolactone given high risk for dehydration; resume if necessary         VTE Risk Mitigation (From admission, onward)           Ordered     enoxaparin injection 30 mg  Daily         02/02/25 2123     IP VTE HIGH RISK PATIENT  Once         02/02/25 2123     Place sequential compression device  Until discontinued         02/02/25 2123                         The attending portion of this evaluation, treatment, and documentation was performed per Anamaria Siu MD via Telemedicine AudioVisual using the secure [employer] software platform with 2 way audio/video. The provider was located off-site and the patient is located in the hospital. The aforementioned video software was utilized to document the relevant history and physical exam    On 02/02/2025, patient should be  placed in hospital observation services under my care.        Anamaria Siu MD  Department of Hospital Medicine   [place] - Emergency Dept

## 2025-02-03 NOTE — ASSESSMENT & PLAN NOTE
S/P fall over a week ago and CT also noted: Age indeterminate minimally displaced fracture involving the distal sacrum/proximal coccyx   Admit for pain control and PT; possibly needing SNF placement  Ortho consulted- no acute intervention  Pain control with tylenol, hydrocodone, and lidocaine patch   PT   Fall precautions

## 2025-02-03 NOTE — PROGRESS NOTES
[facility]  Progress Note    Patient Name: Re Arciniega  MRN: [id number]  Patient Class: OP- Observation   Admission Date: 2/2/2025  Length of Stay: 0 days  Attending Physician: Michel Mackay MD  Primary Care Provider: Anthony Emanuel MD        Subjective     Principal Problem:Right knee pain        HPI:  The patient is a 89 y/o female with PMH of HTN, hypothyroidism, and anemia who presents with right knee pain and swelling. History is primarily obatined from patient's daughter and grand daughter. The patient fell about a week and a half ago. Fall was unwitnessed. She uses a rollator at home and fell down after leaning over to grab something. She does report pain in the buttock and knee. Family requesting admit for pain control and ortho eval and patient and family amenable to placement for PT of needed. Pain controlled and no other complaints at this time.     Overview/Hospital Course:  No notes on file    Interval History: NAEON. Ortho consulted. Recommends rest, ice, elevation, compression. Outpatient follow up. PT/OT recommending SNF. Patient having a considerable amount of pain but none of this is acute.    Review of Systems   All other systems reviewed and are negative.    Objective:     Vital Signs (Most Recent):  Temp: 97.9 °F (36.6 °C) (02/03/25 1128)  Pulse: 65 (02/03/25 1200)  Resp: 16 (02/03/25 1128)  BP: 135/62 (02/03/25 1128)  SpO2: 97 % (02/03/25 1128) Vital Signs (24h Range):  Temp:  [97.8 °F (36.6 °C)-98.8 °F (37.1 °C)] 97.9 °F (36.6 °C)  Pulse:  [59-78] 65  Resp:  [16-20] 16  SpO2:  [96 %-100 %] 97 %  BP: (123-173)/(62-79) 135/62     Weight: 57.2 kg (126 lb 1.7 oz)  Body mass index is 20.35 kg/m².    Intake/Output Summary (Last 24 hours) at 2/3/2025 6689  Last data filed at 2/3/2025 0852  Gross per 24 hour   Intake 480 ml   Output 350 ml   Net 130 ml         Physical Exam      Vitals reviewed  General: NAD, thin, frail  Head: NC/AT  Eyes: EOMI,  KATHERINE  Cardiovascular: Pulses intact distally, Regular Rate   Pulmonary: Normal Respiratory Rate, No respiratory distress  Gi: Soft, Non-tender  Extremities: Warm, No edema present  Skin: Warm, dry  Neuro: Alert, Oriented x3, No focal Deficit  Psych: Appropriate mood and affect      Significant Labs: All pertinent labs within the past 24 hours have been reviewed.    Significant Imaging: I have reviewed all pertinent imaging results/findings within the past 24 hours.    Assessment and Plan     * Right knee pain  S/P fall over a week ago and CT also noted: Age indeterminate minimally displaced fracture involving the distal sacrum/proximal coccyx   Admit for pain control and PT; possibly needing SNF placement  Ortho consulted- no acute intervention  Pain control with tylenol, hydrocodone, and lidocaine patch   PT   Fall precautions       Elevated troponin  Likely a chronic elevation in setting of CKD  Flat on trend  ECG ordered  TTE if patient has new ecg findings  Denies any chest pain      Debility  Patient with Chronic debility due to age-related physical debility. The patient's latest AMPAC (Activity Measure for Post Acute Care) Score is listed below.    AM-PAC Score - How much help does the patient need for each activity listed  Basic Mobility Total Score: 11  Turning over in bed (including adjusting bedclothes, sheets and blankets)?: A little  Sitting down on and standing up from a chair with arms (e.g., wheelchair, bedside commode, etc.): A lot  Moving from lying on back to sitting on the side of the bed?: A lot  Moving to and from a bed to a chair (including a wheelchair)?: A lot  Need to walk in hospital room?: Unable  Climbing 3-5 steps with a railing?: Unable    Plan  - Progressive mobility protocol initated  - PT/OT consulted  - Fall precautions in place            Hypomagnesemia  Patient has Abnormal Magnesium: hypomagnesemia. Will continue to monitor electrolytes closely. Will replace the affected  electrolytes and repeat labs to be done after interventions completed. The patient's magnesium results have been reviewed and are listed below.  Recent Labs   Lab 02/03/25  0439   MG 1.4*        Anemia  Anemia is likely due to chronic disease due to Chronic Kidney Disease. Most recent hemoglobin and hematocrit are listed below.  Recent Labs     02/02/25  1655 02/03/25  0439   HGB 10.1* 10.2*   HCT 30.6* 32.2*     Plan  - Monitor serial CBC: Daily  - Transfuse PRBC if patient becomes hemodynamically unstable, symptomatic or H/H drops below 7/21.  - Patient has not received any PRBC transfusions to date  - Patient's anemia is currently stable      Frail elderly  Complicates care  SW/CM consult      Chronic fatigue  PT/Ot consult      Severe aortic valve stenosis  Echocardiogram with evidence of aortic stenosis that is severe . The patient's most recent echocardiogram result is listed below.     No echocardiogram results found for the past 12 months     Stage 3a chronic kidney disease  Creatine stable for now. BMP reviewed- noted Estimated Creatinine Clearance: 31.1 mL/min (based on SCr of 1 mg/dL). according to latest data. Based on current GFR, CKD stage is stage 3 - GFR 30-59.  Monitor UOP and serial BMP and adjust therapy as needed. Renally dose meds. Avoid nephrotoxic medications and procedures.    Hypothyroidism  Continue levothyroxine       Hypertension, greater than 10 years  Continue carvedilol, furosemide, and losartan   Hold spironolactone given high risk for dehydration; resume if necessary         VTE Risk Mitigation (From admission, onward)           Ordered     enoxaparin injection 30 mg  Daily         02/02/25 2123     IP VTE HIGH RISK PATIENT  Once         02/02/25 2123     Place sequential compression device  Until discontinued         02/02/25 2123                    Discharge Planning   ART: 2/4/2025     Code Status: Full Code   Medical Readiness for Discharge Date:   Discharge Plan A: Home Health                         Michel Mackay MD  Department of Hospital Medicine   [facility]

## 2025-02-03 NOTE — ASSESSMENT & PLAN NOTE
Continue carvedilol, furosemide, and losartan   Hold spironolactone given high risk for dehydration; resume if necessary

## 2025-02-03 NOTE — PLAN OF CARE
Problem: Adult Inpatient Plan of Care  Goal: Plan of Care Review  2/3/2025 0512 by Gian Clemens LPN  Outcome: Progressing  2/3/2025 0512 by Gian Clemens LPN  Outcome: Progressing  Goal: Patient-Specific Goal (Individualized)  2/3/2025 0512 by Gian Clemens LPN  Outcome: Progressing  2/3/2025 0512 by Gian Clemens LPN  Outcome: Progressing  Goal: Absence of Hospital-Acquired Illness or Injury  2/3/2025 0512 by Gian Clemens LPN  Outcome: Progressing  2/3/2025 0512 by Gian Clemens LPN  Outcome: Progressing  Goal: Optimal Comfort and Wellbeing  2/3/2025 0512 by Gian Clemens LPN  Outcome: Progressing  2/3/2025 0512 by Gian Clemens LPN  Outcome: Progressing  Goal: Readiness for Transition of Care  2/3/2025 0512 by Gian Clemens LPN  Outcome: Progressing  2/3/2025 0512 by Gian Clemens LPN  Outcome: Progressing     Problem: Skin Injury Risk Increased  Goal: Skin Health and Integrity  2/3/2025 0512 by Gian Clemens LPN  Outcome: Progressing  2/3/2025 0512 by Gian Clemens LPN  Outcome: Progressing     Problem: Fall Injury Risk  Goal: Absence of Fall and Fall-Related Injury  Outcome: Progressing     Problem: Pain Acute  Goal: Optimal Pain Control and Function  Outcome: Progressing    Chart check complete. Pt Aox4 throughout shift. VSS. Free from falls and new skin breakdown. Bed in lowest position, locked, alarm set, side rails raised x2, call light placed within reach and family at bedside. Rounding done hourly. NAEON. All questions answered, no complaints at this time. Plan of care ongoing.

## 2025-02-03 NOTE — PT/OT/SLP EVAL
Physical Therapy Evaluation     Patient Name: Re Arciniega   MRN: [id number]  Recent Surgery: * No surgery found *      Recommendations:     Discharge Recommendations: Moderate Intensity Therapy (SNF)   Discharge Equipment Recommendations:  (TBD)   Barriers to discharge: Increased level of assist, Decreased caregiver support, and Ongoing medical treatment    Assessment:     Re Arciniega is a 89 y.o. female admitted with a medical diagnosis of Right knee pain. She presents with the following impairments/functional limitations: weakness, impaired self care skills, impaired functional mobility, gait instability, impaired balance, pain, decreased safety awareness.     The patient presented to the emergency department on 2/2/25 with complaints of right knee pain.  She also reports a fall that occurred approximately 10 days ago.    Rehab Prognosis: Good; patient would benefit from acute PT services to address these deficits and reach maximum level of function.    Plan:     During this hospitalization, patient to be seen 5 x/week to address the above listed problems via gait training, therapeutic activities, therapeutic exercises    Plan of Care Expires:  (Upon discharge from the hospital.)    Subjective     Chief Complaint: The patient was seen at the bedside with her daughter present.  The patient reports having a fall about 10 days ago when she was standing with her rollator and leaned over to pick something up off the floor.  Since then, she has been having severe pain in both of her hips and lower back.  She also complains of severe right knee pain with movement but that was present even before the fall.  Her daughter reports the patient has been ambulating independently with the rollator prior to the fall.  Patient Comments/Goals: The patient would like for her pain to stop and for her to regain her independence with mobility.  Pain/Comfort:  Pain Rating 1: 10/10  Location - Side 1: Bilateral  Location 1: hip  Pain  Addressed 1: Reposition, Cessation of Activity, Nurse notified  Pain Rating Post-Intervention 1: 8/10  Location - Side 2: Right  Location 2: knee  Pain Addressed 2: Nurse notified, Reposition, Cessation of Activity    Social History:  Living Environment: The patient's  two sons live with her.  However, once is disabled and the other one is not much help due to his advanced age.  She lives a single story home with  2-3 steps with a hand rail to enter the residence  Prior Level of Function: Prior to admission, patient was modified independent using a rollator for ambulation.  Equipment Used at Home: wheelchair, rollator  DME owned (not currently used): none  Assistance Upon Discharge:  The people she lives with her are unable to assist her.    Objective:     Communicated with nurse prior to session. Patient found right sidelying with telemetry, PureWick, peripheral IV upon PT entry to room.    General Precautions: Standard, fall   Orthopedic Precautions: N/A   Braces: N/A    Respiratory Status: Room air    Exams:  Cognition: Patient is oriented to Person.  Although the patient is alert and oriented, she had a difficult time providing a history making it hard for the therapist to know the exact location of her pain, the intensity of the pain, and what exacerbates the pain.  It is obvious by her facial expressions during movement, she is having severe pain.  RLE ROM:  Limited in all planes due to guarding  RLE Strength: Deficits: 3/5  LLE ROM:  Limited in all planes due to guarding  LLE Strength: Deficits: 3/5  Sensation:    -       Intact    Functional Mobility:  Bed Mobility  Supine to Sit: moderate assistance for LE management, trunk management, and extra time due to pain.  Transfers  Sit to Stand: maximal assistance with no AD  Bed to Chair: maximal assistance with no AD and with cues for hand placement and foot placement using Stand Pivot  Gait  The patient is unable to take steps away from the  bed.  Balance  Sitting: contact guard assistance  Standing: maximal assistance    Therapeutic Activities and Exercises:   Patient educated on role of acute care PT and PT POC, safety while in hospital including calling nurse for mobility, and call light usage    AM-PAC 6 CLICK MOBILITY  Total Score:11    Patient left  on the bedside commode  with all lines intact, call button in reach, RN notified, and daughter present.    GOALS:   Multidisciplinary Problems       Physical Therapy Goals          Problem: Physical Therapy    Goal Priority Disciplines Outcome Interventions   Physical Therapy Goal     PT, PT/OT     Description: Goals    Patient to increase lower extremity strength by 1/2 muscle grade.  Patient to roll left and right with no assistance.  Patient to transfer supine <> sit with min assistance.  Patient to transfer bed <> chair via stand-pivot with min assistance.  Patient to ambulate with a rolling walker and mod assistance > 45 feet.                        DME Justifications:  No DME recommended requiring DME justifications    History:     Past Medical History:   Diagnosis Date    Anemia     CHF (congestive heart failure)     Constipation     Hypertension     Thyroid disease        Past Surgical History:   Procedure Laterality Date    CHOLECYSTECTOMY      HYSTERECTOMY         Time Tracking:     PT Received On: 02/03/25  PT Start Time: 0920  PT Stop Time: 0935  PT Total Time (min): 15 min     Billable Minutes: Evaluation 15    2/3/2025

## 2025-02-03 NOTE — HPI
The patient is a 89 y/o female with PMH of HTN, hypothyroidism, and anemia who presents with right knee pain and swelling. History is primarily obatined from patient's daughter and grand daughter. The patient fell about a week and a half ago. Fall was unwitnessed. She uses a rollator at home and fell down after leaning over to grab something. She does report pain in the buttock and knee. Family requesting admit for pain control and ortho eval and patient and family amenable to placement for PT of needed. Pain controlled while she is at rest but is not controlled when she attempts to move. Pt has no other complaints at this time.

## 2025-02-03 NOTE — ASSESSMENT & PLAN NOTE
Likely a chronic elevation in setting of CKD  Flat on trend  ECG ordered  TTE if patient has new ecg findings  Denies any chest pain

## 2025-02-03 NOTE — PROGRESS NOTES
Subjective:       Patient ID: Re Arciniega is a 89 y.o. female.    Chief Complaint: Diabetic Foot Exam, Foot Pain, Heel Pain (Dark and calloused ), Callouses, Ingrown Toenail, and Toe Pain  Patient presents with her daughter with complaint of pain 2nd digit left, painful ingrown nail great toe left, follow-up bunions, hammertoes, preulcerative calluses on the back of the heels.  Patient relates the right 2nd digit has continued to do well, she starting to get a small callus 2nd digit left foot.  She has not been applying the clobetasol cream as often to the toes but she applies it to the back of the heels every day.  She points to the medial aspect of the left hallux and states it has been very sore with pressure and in shoes  She is not that active but does a fair amount of walking, does pretty well with her walker  Relates swelling has remained improved in her legs, takes Lasix every day  She relates it has really damaged the skin on her legs, skin looks burnt especially right lower leg, denies pain in her legs  Pain level left foot 9/10 today      Past Medical History:   Diagnosis Date    Anemia     CHF (congestive heart failure)     Constipation     Hypertension     Thyroid disease      Past Surgical History:   Procedure Laterality Date    CHOLECYSTECTOMY      HYSTERECTOMY       No family history on file.  Social History     Socioeconomic History    Marital status:    Tobacco Use    Smoking status: Never    Smokeless tobacco: Never   Substance and Sexual Activity    Alcohol use: No    Drug use: No    Sexual activity: Never     Social Drivers of Health     Financial Resource Strain: Low Risk  (4/12/2024)    Overall Financial Resource Strain (CARDIA)     Difficulty of Paying Living Expenses: Not hard at all   Food Insecurity: No Food Insecurity (4/12/2024)    Hunger Vital Sign     Worried About Running Out of Food in the Last Year: Never true     Ran Out of Food in the Last Year: Never true   Transportation  Needs: No Transportation Needs (4/12/2024)    PRAPARE - Transportation     Lack of Transportation (Medical): No     Lack of Transportation (Non-Medical): No   Physical Activity: Inactive (4/12/2024)    Exercise Vital Sign     Days of Exercise per Week: 0 days     Minutes of Exercise per Session: 0 min   Stress: Stress Concern Present (4/12/2024)    [employer] - Occupational Stress Questionnaire     Feeling of Stress : To some extent   Housing Stability: Low Risk  (4/12/2024)    Housing Stability Vital Sign     Unable to Pay for Housing in the Last Year: No     Number of Places Lived in the Last Year: 1     Unstable Housing in the Last Year: No       No current facility-administered medications for this visit.     Current Outpatient Medications   Medication Sig Dispense Refill    aspirin (ECOTRIN) 81 MG EC tablet Take 81 mg by mouth every morning.      carvediloL (COREG) 12.5 MG tablet Take 1 tablet (12.5 mg total) by mouth 2 (two) times daily with meals. 60 tablet 11    clobetasol 0.05% (TEMOVATE) 0.05 % Oint APPLY  OINTMENT TOPICALLY TWICE DAILY TO  HEALS 60 g 0    docusate sodium (COLACE) 50 MG capsule Take by mouth 2 (two) times daily.      ferrous sulfate (FEOSOL) 325 mg (65 mg iron) Tab tablet Take 1 tablet (325 mg total) by mouth once daily. 30 tablet 11    furosemide (LASIX) 40 MG tablet Take 1 tablet (40 mg total) by mouth once daily. 30 tablet 11    IRON 325 mg (65 mg iron) Tab tablet TAKE 1 TABLET BY MOUTH IN THE MORNING      iron-vitamin C 100-250 mg, ICAR-C, 100-250 mg Tab Take 1 tablet by mouth once daily. 30 tablet 0    levothyroxine (SYNTHROID) 88 MCG tablet Take 1 tablet (88 mcg total) by mouth before breakfast. 30 tablet 11    losartan (COZAAR) 100 MG tablet Take 1 tablet (100 mg total) by mouth once daily. 30 tablet 11    losartan (COZAAR) 50 MG tablet Take 1 tablet (50 mg total) by mouth every evening. 90 tablet 3    metoclopramide HCl (REGLAN) 10 MG tablet 10 mg.    "   omeprazole (PRILOSEC) 40 MG capsule Take 40 mg by mouth.      omeprazole 20 mg TbEC Oral, Daily, 0 Refill(s), Maintenance      polyethylene glycol (GLYCOLAX) 17 gram PwPk Take 17 g by mouth once daily. 30 packet 0    spironolactone (ALDACTONE) 25 MG tablet Take 1 tablet (25 mg total) by mouth once daily. 90 tablet 3    STOOL SOFTENER-STIMULANT LAXAT 8.6-50 mg per tablet Take by mouth.       Review of patient's allergies indicates:  No Known Allergies    Review of Systems   Cardiovascular:  Positive for leg swelling.        Significant improvement    Musculoskeletal:  Positive for gait problem.        Walker   All other systems reviewed and are negative.      Objective:      Vitals:    01/31/25 1407   BP: 138/69   BP Location: Left arm   Patient Position: Sitting   Pulse: 73   Weight: 54.4 kg (120 lb)   Height: 5' 6" (1.676 m)     Physical Exam  Vitals and nursing note reviewed. Exam conducted with a chaperone present.   Constitutional:       General: She is not in acute distress.     Appearance: Normal appearance.   Cardiovascular:      Pulses:           Dorsalis pedis pulses are 1+ on the right side and 1+ on the left side.        Posterior tibial pulses are 0 on the right side and 0 on the left side.   Musculoskeletal:         General: Tenderness and deformity present.      Right foot: Decreased range of motion. Deformity (hammertoe 2nd b/l) and bunion present.      Left foot: Decreased range of motion. Deformity and bunion present.      Comments: 2nd digit left, posterior heels, all 3 areas due to chronic lesions in underlying bony deformity     Feet:      Right foot:      Skin integrity: Callus present.      Toenail Condition: Right toenails are abnormally thick. Fungal disease present.     Left foot:      Skin integrity: Callus (Tender hyperkeratotic tissue medial 2nd digit left.  Tender chronic callus posterior lateral left > right heel) present.      Toenail Condition: Left toenails are abnormally thick. " Fungal disease present.  Skin:     Capillary Refill: Capillary refill takes more than 3 seconds.   Neurological:      General: No focal deficit present.      Mental Status: She is alert.      Comments: Significant decreased neuritis pain forefoot bilateral   Psychiatric:         Thought Content: Thought content normal.                                  Assessment:       1. Hammertoe of second toe of left foot    2. Pain in left toe(s)    3. Hallux abducto valgus, bilateral    4. Hammer toes of both feet    5. Arthritis of both feet    6. Edema of both lower extremities    7. Pre-ulcerative calluses - Left Foot    8. Ingrown nail of great toe of left foot            Plan:         Reviewed bunions, hammertoes, arthritis and multiple bony prominences which she needs to continue to treat to avoid recurrence  Most important of these areas medial aspect 2nd digits bilateral  This area remains completely healed on the right foot, she is starting to develop a raised callus medial PIPJ 2nd digit left  Instructed patient to continue applying clobetasol to these areas on both 2nd toes every day along with the back of her heels  Explained these areas improved significantly once the swelling went down in her legs, she needs to monitor the swelling very closely, if it returns she will very quickly develop more friction on the sides of the 2nd toes in these areas will come back the last.  They need to be managed on a daily basis  Reviewed care and maintenance of these areas as well as calluses on the back of the heels long term  Make sure there is no pressure on the back of the heels when sleeping, apply a pillow under the lower legs  We did discuss condition of skin especially in the right lower leg, is discolored due to chronic tension secondary to swelling but it has improved significantly  Instructed to moisturize with lotion or moisturizer a couple times a week to both lower legs  Hyperkeratotic tissue debrided medial left 2nd  digit and preulcerative calluses posterior heels  Ingrown nail removed/debrided medial left hallux.  No bleeding or drainage.  Triple antibiotic ointment, gauze, coban applied.   Patient is not a candidate for nail avulsion at this time.    Instructed patient to leave dressing intact until the morning, if sore soak warm water and Epson salt in the morning, apply antibiotic ointment and a bandage for the day and unwrap at night, perform this same treatment daily until any remaining discomfort has resolved, then start applying Vicks vapor rub to this corner at least once a day to try to prevent recurrence  Other nails debrided with no other areas of complication at this time  Reviewed arthritis in feet, Voltaren gel over-the-counter topical applied as directed for any joint pain  Again reviewed importance of controlled swelling, appropriate wide shoes with soft material  Check feet daily and contact office with any area of concern  Patient was in understanding and agreement with treatment plan.  I counseled the patient on their conditions, implications and medical management.  Instructed patient/family to contact the office with any changes, questions, concerns, worsening of symptoms.   Total face to face time 30 minutes, exam, assessment, treatment, discussion, additional time for review of chart prior to and following appointment and visit documentation, consultation and coordination of care.    Follow up as needed      This note was created using M*Modal voice recognition software that occasionally misinterpreted phrases or words.

## 2025-02-04 LAB
ALBUMIN SERPL BCP-MCNC: 3 G/DL (ref 3.5–5.2)
ALP SERPL-CCNC: 66 U/L (ref 40–150)
ALT SERPL W/O P-5'-P-CCNC: 10 U/L (ref 10–44)
ANION GAP SERPL CALC-SCNC: 8 MMOL/L (ref 8–16)
AST SERPL-CCNC: 15 U/L (ref 10–40)
BACTERIA UR CULT: NO GROWTH
BILIRUB SERPL-MCNC: 0.6 MG/DL (ref 0.1–1)
BUN SERPL-MCNC: 27 MG/DL (ref 10–30)
CALCIUM SERPL-MCNC: 8.8 MG/DL (ref 8.7–10.5)
CHLORIDE SERPL-SCNC: 109 MMOL/L (ref 95–110)
CO2 SERPL-SCNC: 25 MMOL/L (ref 23–29)
CREAT SERPL-MCNC: 1 MG/DL (ref 0.5–1.4)
EST. GFR  (NO RACE VARIABLE): 52.2 ML/MIN/1.73 M^2
GLUCOSE SERPL-MCNC: 100 MG/DL (ref 70–110)
MAGNESIUM SERPL-MCNC: 1.8 MG/DL (ref 1.6–2.6)
OHS QRS DURATION: 134 MS
OHS QTC CALCULATION: 516 MS
PHOSPHATE SERPL-MCNC: 2.7 MG/DL (ref 2.7–4.5)
POTASSIUM SERPL-SCNC: 3.8 MMOL/L (ref 3.5–5.1)
PROT SERPL-MCNC: 6.1 G/DL (ref 6–8.4)
SODIUM SERPL-SCNC: 142 MMOL/L (ref 136–145)

## 2025-02-04 PROCEDURE — 84100 ASSAY OF PHOSPHORUS: CPT | Performed by: STUDENT IN AN ORGANIZED HEALTH CARE EDUCATION/TRAINING PROGRAM

## 2025-02-04 PROCEDURE — 30200315 PPD INTRADERMAL TEST REV CODE 302: Performed by: STUDENT IN AN ORGANIZED HEALTH CARE EDUCATION/TRAINING PROGRAM

## 2025-02-04 PROCEDURE — 80053 COMPREHEN METABOLIC PANEL: CPT | Performed by: STUDENT IN AN ORGANIZED HEALTH CARE EDUCATION/TRAINING PROGRAM

## 2025-02-04 PROCEDURE — 97530 THERAPEUTIC ACTIVITIES: CPT

## 2025-02-04 PROCEDURE — 99233 SBSQ HOSP IP/OBS HIGH 50: CPT | Mod: ,,, | Performed by: STUDENT IN AN ORGANIZED HEALTH CARE EDUCATION/TRAINING PROGRAM

## 2025-02-04 PROCEDURE — 36415 COLL VENOUS BLD VENIPUNCTURE: CPT | Performed by: STUDENT IN AN ORGANIZED HEALTH CARE EDUCATION/TRAINING PROGRAM

## 2025-02-04 PROCEDURE — 86580 TB INTRADERMAL TEST: CPT | Performed by: STUDENT IN AN ORGANIZED HEALTH CARE EDUCATION/TRAINING PROGRAM

## 2025-02-04 PROCEDURE — 63600175 PHARM REV CODE 636 W HCPCS: Performed by: STUDENT IN AN ORGANIZED HEALTH CARE EDUCATION/TRAINING PROGRAM

## 2025-02-04 PROCEDURE — 83735 ASSAY OF MAGNESIUM: CPT | Performed by: STUDENT IN AN ORGANIZED HEALTH CARE EDUCATION/TRAINING PROGRAM

## 2025-02-04 PROCEDURE — 25000003 PHARM REV CODE 250: Performed by: HOSPITALIST

## 2025-02-04 PROCEDURE — 97166 OT EVAL MOD COMPLEX 45 MIN: CPT

## 2025-02-04 PROCEDURE — 21400001 HC TELEMETRY ROOM

## 2025-02-04 PROCEDURE — 94761 N-INVAS EAR/PLS OXIMETRY MLT: CPT

## 2025-02-04 RX ORDER — MAGNESIUM SULFATE 1 G/100ML
1 INJECTION INTRAVENOUS ONCE
Status: COMPLETED | OUTPATIENT
Start: 2025-02-04 | End: 2025-02-04

## 2025-02-04 RX ORDER — ENOXAPARIN SODIUM 100 MG/ML
40 INJECTION SUBCUTANEOUS EVERY 24 HOURS
Status: DISCONTINUED | OUTPATIENT
Start: 2025-02-04 | End: 2025-02-06

## 2025-02-04 RX ADMIN — ACETAMINOPHEN 650 MG: 325 TABLET ORAL at 08:02

## 2025-02-04 RX ADMIN — MAGNESIUM SULFATE HEPTAHYDRATE 1 G: 1 INJECTION, SOLUTION INTRAVENOUS at 12:02

## 2025-02-04 RX ADMIN — LOSARTAN POTASSIUM 50 MG: 25 TABLET, FILM COATED ORAL at 08:02

## 2025-02-04 RX ADMIN — ACETAMINOPHEN 650 MG: 325 TABLET ORAL at 12:02

## 2025-02-04 RX ADMIN — FUROSEMIDE 40 MG: 20 TABLET ORAL at 07:02

## 2025-02-04 RX ADMIN — ASPIRIN 81 MG: 81 TABLET, COATED ORAL at 07:02

## 2025-02-04 RX ADMIN — CARVEDILOL 12.5 MG: 12.5 TABLET, FILM COATED ORAL at 07:02

## 2025-02-04 RX ADMIN — CARVEDILOL 12.5 MG: 12.5 TABLET, FILM COATED ORAL at 05:02

## 2025-02-04 RX ADMIN — ENOXAPARIN SODIUM 40 MG: 40 INJECTION SUBCUTANEOUS at 05:02

## 2025-02-04 RX ADMIN — ACETAMINOPHEN 650 MG: 325 TABLET ORAL at 05:02

## 2025-02-04 RX ADMIN — LIDOCAINE 2 PATCH: 700 PATCH TOPICAL at 08:02

## 2025-02-04 RX ADMIN — LOSARTAN POTASSIUM 100 MG: 25 TABLET, FILM COATED ORAL at 07:02

## 2025-02-04 RX ADMIN — LEVOTHYROXINE SODIUM 88 MCG: 88 TABLET ORAL at 05:02

## 2025-02-04 RX ADMIN — TUBERCULIN PURIFIED PROTEIN DERIVATIVE 5 UNITS: 5 INJECTION, SOLUTION INTRADERMAL at 11:02

## 2025-02-04 NOTE — PROGRESS NOTES
[facility] Medicine  Progress Note    Patient Name: Re Arciniega  MRN: [id number]  Patient Class: IP- Inpatient   Admission Date: 2/2/2025  Length of Stay: 1 days  Attending Physician: Michel Mackay MD  Primary Care Provider: Anthony Emanuel MD        Subjective     Principal Problem:Right knee pain        HPI:  The patient is a 89 y/o female with PMH of HTN, hypothyroidism, and anemia who presents with right knee pain and swelling. History is primarily obatined from patient's daughter and grand daughter. The patient fell about a week and a half ago. Fall was unwitnessed. She uses a rollator at home and fell down after leaning over to grab something. She does report pain in the buttock and knee. Family requesting admit for pain control and ortho eval and patient and family amenable to placement for PT of needed. Pain controlled and no other complaints at this time.     Overview/Hospital Course:  No notes on file    Interval History: NAEON. Ortho consulted. Recommends rest, ice, elevation, compression. Outpatient follow up. PT/OT recommending SNF. Patient having a considerable amount of pain but none of this is acute.    Review of Systems   All other systems reviewed and are negative.    Objective:     Vital Signs (Most Recent):  Temp: 98.8 °F (37.1 °C) (02/04/25 0749)  Pulse: 71 (02/04/25 0749)  Resp: 20 (02/04/25 0749)  BP: (!) 162/75 (02/04/25 0749)  SpO2: 96 % (02/04/25 0810) Vital Signs (24h Range):  Temp:  [97.9 °F (36.6 °C)-98.8 °F (37.1 °C)] 98.8 °F (37.1 °C)  Pulse:  [60-71] 71  Resp:  [16-20] 20  SpO2:  [96 %-97 %] 96 %  BP: (130-162)/(60-75) 162/75     Weight: 57.2 kg (126 lb 1.7 oz)  Body mass index is 20.35 kg/m².    Intake/Output Summary (Last 24 hours) at 2/4/2025 1041  Last data filed at 2/3/2025 1828  Gross per 24 hour   Intake 480 ml   Output 900 ml   Net -420 ml         Physical Exam      Vitals reviewed  General: NAD, thin, frail  Head: NC/AT  Eyes: EOMI,  KATHERINE  Cardiovascular: Pulses intact distally, Regular Rate   Pulmonary: Normal Respiratory Rate, No respiratory distress  Gi: Soft, Non-tender  Extremities: Warm, No edema present  Skin: Warm, dry  Neuro: Alert, Oriented x3, No focal Deficit  Psych: Appropriate mood and affect      Significant Labs: All pertinent labs within the past 24 hours have been reviewed.    Significant Imaging: I have reviewed all pertinent imaging results/findings within the past 24 hours.    Assessment and Plan     * Right knee pain  S/P fall over a week ago and CT also noted: Age indeterminate minimally displaced fracture involving the distal sacrum/proximal coccyx   Admit for pain control and PT; possibly needing SNF placement  Ortho consulted- no acute intervention  Pain control with tylenol, hydrocodone, and lidocaine patch   PT   Fall precautions       Elevated troponin  Likely a chronic elevation in setting of CKD  Flat on trend  ECG ordered  TTE if patient has new ecg findings  Denies any chest pain      Debility  Patient with Chronic debility due to age-related physical debility. The patient's latest AMPAC (Activity Measure for Post Acute Care) Score is listed below.    AM-PAC Score - How much help does the patient need for each activity listed  Basic Mobility Total Score: 11  Turning over in bed (including adjusting bedclothes, sheets and blankets)?: A little  Sitting down on and standing up from a chair with arms (e.g., wheelchair, bedside commode, etc.): A lot  Moving from lying on back to sitting on the side of the bed?: A lot  Moving to and from a bed to a chair (including a wheelchair)?: A lot  Need to walk in hospital room?: Unable  Climbing 3-5 steps with a railing?: Unable    Plan  - Progressive mobility protocol initated  - PT/OT consulted  - Fall precautions in place            Hypomagnesemia  Patient has Abnormal Magnesium: hypomagnesemia. Will continue to monitor electrolytes closely. Will replace the affected  electrolytes and repeat labs to be done after interventions completed. The patient's magnesium results have been reviewed and are listed below.  Recent Labs   Lab 02/03/25  0439   MG 1.4*        Anemia  Anemia is likely due to chronic disease due to Chronic Kidney Disease. Most recent hemoglobin and hematocrit are listed below.  Recent Labs     02/02/25  1655 02/03/25  0439   HGB 10.1* 10.2*   HCT 30.6* 32.2*     Plan  - Monitor serial CBC: Daily  - Transfuse PRBC if patient becomes hemodynamically unstable, symptomatic or H/H drops below 7/21.  - Patient has not received any PRBC transfusions to date  - Patient's anemia is currently stable      Frail elderly  Complicates care  SW/CM consult      Chronic fatigue  PT/Ot consult      Severe aortic valve stenosis  Echocardiogram with evidence of aortic stenosis that is severe . The patient's most recent echocardiogram result is listed below.     No echocardiogram results found for the past 12 months     Stage 3a chronic kidney disease  Creatine stable for now. BMP reviewed- noted Estimated Creatinine Clearance: 31.1 mL/min (based on SCr of 1 mg/dL). according to latest data. Based on current GFR, CKD stage is stage 3 - GFR 30-59.  Monitor UOP and serial BMP and adjust therapy as needed. Renally dose meds. Avoid nephrotoxic medications and procedures.    Hypothyroidism  Continue levothyroxine       Hypertension, greater than 10 years  Continue carvedilol, furosemide, and losartan   Hold spironolactone given high risk for dehydration; resume if necessary         VTE Risk Mitigation (From admission, onward)           Ordered     enoxaparin injection 40 mg  Daily         02/04/25 0047     IP VTE HIGH RISK PATIENT  Once         02/02/25 2123     Place sequential compression device  Until discontinued         02/02/25 2123                    Discharge Planning   ART: 2/7/2025     Code Status: Full Code   Medical Readiness for Discharge Date:   Discharge Plan A: Home Health                 Please place Justification for DME        Michel Mackay MD  Department of Hospital Medicine   [facility]

## 2025-02-04 NOTE — PROGRESS NOTES
Pharmacist Renal Dose Adjustment Note    Re Arciniega is a 89 y.o. female being treated with the medication lovenox    Patient Data:    Vital Signs (Most Recent):  Temp: 98 °F (36.7 °C) (02/04/25 0003)  Pulse: 62 (02/04/25 0003)  Resp: 17 (02/04/25 0003)  BP: 130/60 (02/04/25 0003)  SpO2: 97 % (02/04/25 0003) Vital Signs (72h Range):  Temp:  [97.8 °F (36.6 °C)-99 °F (37.2 °C)]   Pulse:  [59-78]   Resp:  [16-20]   BP: (123-173)/(60-84)   SpO2:  [96 %-100 %]      Recent Labs   Lab 02/02/25  1655 02/03/25  0439   CREATININE 1.3 1.0     Serum creatinine: 1 mg/dL 02/03/25 0439  Estimated creatinine clearance: 31.1 mL/min    Medication:lovenox dose: 30mg frequency daily will be changed to medication:lovenox dose:40mg frequency:daily    Pharmacist's Name: Olvin Cox

## 2025-02-04 NOTE — RESPIRATORY THERAPY
02/03/25 1911   Patient Assessment/Suction   Level of Consciousness (AVPU) alert   Respiratory Effort Normal;Unlabored   Expansion/Accessory Muscles/Retractions no retractions;no use of accessory muscles   All Lung Fields Breath Sounds Anterior:;Posterior:;Lateral:;equal bilaterally;clear   Rhythm/Pattern, Respiratory depth regular;pattern regular;unlabored   Cough Frequency no cough   PRE-TX-O2   Device (Oxygen Therapy) room air   SpO2 96 %   Pulse Oximetry Type Intermittent   $ Pulse Oximetry - Multiple Charge Pulse Oximetry - Multiple   Pulse 62   Resp 17   Aerosol Therapy   $ Aerosol Therapy Charges PRN treatment not required   Daily Review of Necessity (SVN) completed   Respiratory Treatment Status (SVN) PRN treatment not required

## 2025-02-04 NOTE — PLAN OF CARE
02/04/25 1050   Discharge Reassessment   Assessment Type Discharge Planning Reassessment   Did the patient's condition or plan change since previous assessment? Yes   Discharge Plan discussed with: Adult children   Communicated ART with patient/caregiver Yes   Discharge Plan A Skilled Nursing Facility     Pt was meets INPT criteria, SNF choices given to family and referral sent

## 2025-02-04 NOTE — PLAN OF CARE
02/04/25 0953   Post-Acute Status   Post-Acute Authorization Placement   Post-Acute Placement Status Referrals Sent     Received choices from saurav Nahomy via phone , referral sent to [facility]which is 1st choice.

## 2025-02-04 NOTE — PT/OT/SLP EVAL
Occupational Therapy Evaluation     Name: Re Arciniega  MRN: [id number]  Admitting Diagnosis: Right knee pain  Recent Surgery: * No surgery found *      Recommendations:     Discharge Recommendations: Moderate Intensity Therapy  Level of Assistance Recommended: 24 hours significant assistance  Discharge Equipment Recommendations: walker, rolling, wheelchair, rollator, bedside commode  Barriers to discharge: Decreased caregiver support, Inaccessible home environment    Assessment:     Re Arciniega is a 89 y.o. female with a medical diagnosis of Right knee pain. She presents with performance deficits affecting function including weakness, impaired endurance, impaired self care skills, impaired functional mobility, impaired balance, decreased safety awareness, decreased coordination, decreased lower extremity function.     Rehab Prognosis: Fair; patient would benefit from acute OT services to address these deficits and reach maximum level of function.    Plan:     Patient to be seen 2 x/week to address the above listed problems via self-care/home management, therapeutic activities, therapeutic exercises, neuromuscular re-education  Plan of Care Expires:    Plan of Care Reviewed with: patient, daughter    Subjective     Chief Complaint: soreness and weakness all over   Patient Comments/Goals: get stronger and go to a facility she can receive 24 hr care because her daughters are not able   Pain/Comfort:  Pain Rating 1: 0/10    Patients cultural, spiritual, Roman [employer] conflicts given the current situation:      Social History:  Living Environment: Patient lives alone in a single story home with number of outside stair(s): 0  Prior Level of Function: Prior to admission, patient was modified independent  Roles and Routines: Patient was not driving prior to admission.  Equipment Used at Home: rollator, walker, rolling  DME owned (not currently used): rollator  Assistance Upon Discharge: family and facility staff    Objective:      Communicated with nursing prior to session. Patient found supine with bed alarm, peripheral IV upon OT entry to room.    General Precautions: Standard,     Orthopedic Precautions:     Braces:      Respiratory Status: Room air    Occupational Performance    Gait belt applied - No    Bed Mobility:   Rolling/Turning to Right with moderate assistance    Functional Mobility/Transfers:  Sit <> Stand Transfer with moderate assistance with hand-held assist  Functional Mobility: unable to stand with OT. Pt stood with PT during session before with mod A and RW     Activities of Daily Living:  Bathing: maximal assistance  Upper Body Dressing: maximal assistance  Lower Body Dressing: maximal assistance    Cognitive/Visual Perceptual:  WFL    Physical Exam:  Dominant hand: Right  Upper Extremity Range of Motion:     -       Right Upper Extremity: WNL  -       Left Upper Extremity: WFL  Upper Extremity Strength:    -       Right Upper Extremity: WFL  -       Left Upper Extremity: WFL   Strength:    -       Right Upper Extremity: WFL  -       Left Upper Extremity: WFL    AMPAC 6 Click ADL:  AMPAC Total Score: 13    Treatment & Education:  Patient educated on role of OT, POC, and goals for therapy  Patient educated on importance of OOB activities with staff member assistance and sitting OOB majority of the day  Pt was supine in bed. Had just received PT treatment and eval along with bloodwork completed but agreed to OT assessment. 3/5 UE strength in BUEs in all planes and with . Max cues for encouragement with overhead flexion but PROM was approx 130 overhead. She states needing max A to toilet and inability to perform hygiene without nursing assistance. Mod A sit to stand with RW and max cues to stand up straight during PT session. She would benefit from 24 hr assistance due to weakness and high fall risk from her inability to ext trunk and her knees in standing during transfers.     Patient clear to stand pivot  transfer with RN/PCT, assist x1 .    Patient left supine with all lines intact, call button in reach, and RN notified.    GOALS:   Pt to increase OOB tolerance to 30 min per day seated in upright chair by santos.  Pt to increase LB dressing IND to set up/ SBA for donning socks and pants by d.c  Pt to increase IND with stand pivot transfer in order to t/f to and from [facility] by d/c.   Pt to complete toileting with SBA for all components (LB drsg, pericare and stand pivot) by d/c.       History:     Past Medical History:   Diagnosis Date    Anemia     CHF (congestive heart failure)     Constipation     Hypertension     Thyroid disease          Past Surgical History:   Procedure Laterality Date    CHOLECYSTECTOMY      HYSTERECTOMY         Time Tracking:     OT Date of Treatment: 02/04/25  OT Start Time: 1005  OT Stop Time: 1020  OT Total Time (min): 15 min    Billable Minutes: Evaluation 15    2/4/2025

## 2025-02-04 NOTE — PLAN OF CARE
02/04/25 0954   Medicare Message   Important Message from Medicare regarding Discharge Appeal Rights Given to patient/caregiver;Explained to patient/caregiver;Signed/date by patient/caregiver   Date IMM was signed 02/04/25   Time IMM was signed 0950     Placed in folder

## 2025-02-04 NOTE — NURSING
Plan of care reviewed with pt. ASHA throughout shift. PRN pain medications provided as ordered. Low sodium diet. Up to beside commode. Purewick in place. Continue to Monitor Labs. VSS. Continuous telemetry monitor maintained. Safety maintained. Will continue to monitor. No complaints at this time.

## 2025-02-04 NOTE — PT/OT/SLP PROGRESS
Physical Therapy Treatment    Patient Name:  Re Arciniega   MRN:  [id number]    Recommendations:     Discharge Recommendations: Moderate Intensity Therapy (SNF)  Discharge Equipment Recommendations:  (TBD)  Barriers to discharge:  Increased level of assistance, ongoing medical treatment    Assessment:     Re Arciniega is a 89 y.o. female admitted with a medical diagnosis of Right knee pain.  She presents with the following impairments/functional limitations: weakness, impaired self care skills, impaired functional mobility, gait instability, impaired balance, pain, decreased safety awareness.    The patient continues to have difficulty expressing the location, severity, and type of pain.  However, she clearly demonstrates muscle guarding and facial grimacing.    Rehab Prognosis: Good; patient would benefit from acute skilled PT services to address these deficits and reach maximum level of function.    Recent Surgery: * No surgery found *      Plan:     During this hospitalization, patient to be seen 5 x/week to address the identified rehab impairments via gait training, therapeutic activities, therapeutic exercises and progress toward the following goals:    Plan of Care Expires:   (Upon discharge from the hospital.)    Subjective     Chief Complaint: The patient continues to complain of pain across her lower back and in the right knee.  Patient/Family Comments/goals: The patient would like to return to independent mobility.  Pain/Comfort:  Pain Rating 1: 8/10  Location - Side 1: Right  Location 1: knee  Pain Addressed 1: Nurse notified  Pain Rating Post-Intervention 1: 8/10  Location - Orientation 2: lower  Location 2: back  Pain Addressed 2: Nurse notified      Objective:     Communicated with linwood prior to session.  Patient found HOB elevated with telemetry, PureWick, peripheral IV upon PT entry to room.     General Precautions: Standard, fall  Orthopedic Precautions: N/A  Braces: N/A  Respiratory Status: Room air      Functional Mobility:  Bed Mobility  Supine to Sit: moderate assistance for LE management, trunk management, and extra time due to pain.  Transfers  Sit to Stand: moderate assistance with a rolling walker  Gait  The patient struggled for several minutes standing hunched over before she could tolerate standing with her hip and knees extended, and her back straight.  She stood with improved stability but is still unable to take steps away from the bed.  Balance  Sitting: SBA  Standing: moderate assistance with the rolling walker    Treatment & Education:  The patient was seen at the bedside with her daughter present for supine to sit transfer training with mod assist to the legs and trunk.  She required mod assist to scoot to the edge of the bed.  Once sitting, she could maintain her balance with SBA.  She received sit to stand transfer training from the bed to the rolling walker with mod assist to help lift her and to stabilize the walker.  Once standing, she was hunched over and it took several minutes before she could straighten up.  By this time, she was too fatigued and too unsteady to attempt taking steps.  She returned to supine with mod assist and required two person assist to scoot up in the bed.    Patient left HOB elevated with all lines intact, call button in reach, bed alarm on, and nurse and daughter present..    GOALS:   Multidisciplinary Problems       Physical Therapy Goals          Problem: Physical Therapy    Goal Priority Disciplines Outcome Interventions   Physical Therapy Goal     PT, PT/OT     Description: Goals    Patient to increase lower extremity strength by 1/2 muscle grade.  Patient to roll left and right with no assistance.  Patient to transfer supine <> sit with min assistance.  Patient to transfer bed <> chair via stand-pivot with min assistance.  Patient to ambulate with a rolling walker and mod assistance > 45 feet.                        DME Justifications:  No DME recommended  requiring DME justifications    Time Tracking:     PT Received On: 02/04/25  PT Start Time: 0920     PT Stop Time: 0940  PT Total Time (min): 20 min     Billable Minutes: Therapeutic Activity 20    Treatment Type: Treatment  PT/PTA: PT     Number of PTA visits since last PT visit: 0     02/04/2025

## 2025-02-04 NOTE — PLAN OF CARE
Problem: Adult Inpatient Plan of Care  Goal: Plan of Care Review  Outcome: Progressing  Goal: Patient-Specific Goal (Individualized)  Outcome: Progressing  Goal: Absence of Hospital-Acquired Illness or Injury  Outcome: Progressing  Goal: Optimal Comfort and Wellbeing  Outcome: Progressing  Goal: Readiness for Transition of Care  Outcome: Progressing     Problem: Skin Injury Risk Increased  Goal: Skin Health and Integrity  Outcome: Progressing     Problem: Fall Injury Risk  Goal: Absence of Fall and Fall-Related Injury  Outcome: Progressing     Problem: Pain Acute  Goal: Optimal Pain Control and Function  Outcome: Progressing    Chart check complete. Pt Aox4 throughout shift. VSS. Free from falls and new skin breakdown. Bed in lowest position, locked, alarm set, side rails raised x2, and call light placed within reach. Rounding done hourly. NAEON. All questions answered, no complaints at this time. Plan of care ongoing.

## 2025-02-04 NOTE — SUBJECTIVE & OBJECTIVE
Interval History: NAEON. Ortho consulted. Recommends rest, ice, elevation, compression. Outpatient follow up. PT/OT recommending SNF. Patient having a considerable amount of pain but none of this is acute.    Review of Systems   All other systems reviewed and are negative.    Objective:     Vital Signs (Most Recent):  Temp: 98.8 °F (37.1 °C) (02/04/25 0749)  Pulse: 71 (02/04/25 0749)  Resp: 20 (02/04/25 0749)  BP: (!) 162/75 (02/04/25 0749)  SpO2: 96 % (02/04/25 0810) Vital Signs (24h Range):  Temp:  [97.9 °F (36.6 °C)-98.8 °F (37.1 °C)] 98.8 °F (37.1 °C)  Pulse:  [60-71] 71  Resp:  [16-20] 20  SpO2:  [96 %-97 %] 96 %  BP: (130-162)/(60-75) 162/75     Weight: 57.2 kg (126 lb 1.7 oz)  Body mass index is 20.35 kg/m².    Intake/Output Summary (Last 24 hours) at 2/4/2025 1041  Last data filed at 2/3/2025 1828  Gross per 24 hour   Intake 480 ml   Output 900 ml   Net -420 ml         Physical Exam      Vitals reviewed  General: NAD, thin, frail  Head: NC/AT  Eyes: EOMI, KATHERINE  Cardiovascular: Pulses intact distally, Regular Rate   Pulmonary: Normal Respiratory Rate, No respiratory distress  Gi: Soft, Non-tender  Extremities: Warm, No edema present  Skin: Warm, dry  Neuro: Alert, Oriented x3, No focal Deficit  Psych: Appropriate mood and affect      Significant Labs: All pertinent labs within the past 24 hours have been reviewed.    Significant Imaging: I have reviewed all pertinent imaging results/findings within the past 24 hours.

## 2025-02-05 LAB
ALBUMIN SERPL BCP-MCNC: 2.9 G/DL (ref 3.5–5.2)
ALP SERPL-CCNC: 63 U/L (ref 40–150)
ALT SERPL W/O P-5'-P-CCNC: 7 U/L (ref 10–44)
ANION GAP SERPL CALC-SCNC: 7 MMOL/L (ref 8–16)
AST SERPL-CCNC: 14 U/L (ref 10–40)
BASOPHILS # BLD AUTO: 0.02 K/UL (ref 0–0.2)
BASOPHILS NFR BLD: 0.5 % (ref 0–1.9)
BILIRUB SERPL-MCNC: 0.5 MG/DL (ref 0.1–1)
BUN SERPL-MCNC: 27 MG/DL (ref 10–30)
CALCIUM SERPL-MCNC: 8.7 MG/DL (ref 8.7–10.5)
CHLORIDE SERPL-SCNC: 108 MMOL/L (ref 95–110)
CO2 SERPL-SCNC: 26 MMOL/L (ref 23–29)
CREAT SERPL-MCNC: 1 MG/DL (ref 0.5–1.4)
DIFFERENTIAL METHOD BLD: ABNORMAL
EOSINOPHIL # BLD AUTO: 0.2 K/UL (ref 0–0.5)
EOSINOPHIL NFR BLD: 4 % (ref 0–8)
ERYTHROCYTE [DISTWIDTH] IN BLOOD BY AUTOMATED COUNT: 13.7 % (ref 11.5–14.5)
EST. GFR  (NO RACE VARIABLE): 52.2 ML/MIN/1.73 M^2
GLUCOSE SERPL-MCNC: 74 MG/DL (ref 70–110)
HCT VFR BLD AUTO: 31.8 % (ref 37–48.5)
HGB BLD-MCNC: 10.3 G/DL (ref 12–16)
IMM GRANULOCYTES # BLD AUTO: 0.01 K/UL (ref 0–0.04)
IMM GRANULOCYTES NFR BLD AUTO: 0.2 % (ref 0–0.5)
LYMPHOCYTES # BLD AUTO: 1.2 K/UL (ref 1–4.8)
LYMPHOCYTES NFR BLD: 28.3 % (ref 18–48)
MAGNESIUM SERPL-MCNC: 1.9 MG/DL (ref 1.6–2.6)
MCH RBC QN AUTO: 31.8 PG (ref 27–31)
MCHC RBC AUTO-ENTMCNC: 32.4 G/DL (ref 32–36)
MCV RBC AUTO: 98 FL (ref 82–98)
MONOCYTES # BLD AUTO: 0.5 K/UL (ref 0.3–1)
MONOCYTES NFR BLD: 11.4 % (ref 4–15)
NEUTROPHILS # BLD AUTO: 2.3 K/UL (ref 1.8–7.7)
NEUTROPHILS NFR BLD: 55.6 % (ref 38–73)
NRBC BLD-RTO: 0 /100 WBC
PHOSPHATE SERPL-MCNC: 2.8 MG/DL (ref 2.7–4.5)
PLATELET # BLD AUTO: 168 K/UL (ref 150–450)
PMV BLD AUTO: 10 FL (ref 9.2–12.9)
POTASSIUM SERPL-SCNC: 3.9 MMOL/L (ref 3.5–5.1)
PROT SERPL-MCNC: 5.9 G/DL (ref 6–8.4)
RBC # BLD AUTO: 3.24 M/UL (ref 4–5.4)
SODIUM SERPL-SCNC: 141 MMOL/L (ref 136–145)
WBC # BLD AUTO: 4.21 K/UL (ref 3.9–12.7)

## 2025-02-05 PROCEDURE — 63600175 PHARM REV CODE 636 W HCPCS: Performed by: STUDENT IN AN ORGANIZED HEALTH CARE EDUCATION/TRAINING PROGRAM

## 2025-02-05 PROCEDURE — 80053 COMPREHEN METABOLIC PANEL: CPT | Performed by: STUDENT IN AN ORGANIZED HEALTH CARE EDUCATION/TRAINING PROGRAM

## 2025-02-05 PROCEDURE — 97116 GAIT TRAINING THERAPY: CPT

## 2025-02-05 PROCEDURE — 21400001 HC TELEMETRY ROOM

## 2025-02-05 PROCEDURE — 99900035 HC TECH TIME PER 15 MIN (STAT)

## 2025-02-05 PROCEDURE — 84100 ASSAY OF PHOSPHORUS: CPT | Performed by: STUDENT IN AN ORGANIZED HEALTH CARE EDUCATION/TRAINING PROGRAM

## 2025-02-05 PROCEDURE — 99900031 HC PATIENT EDUCATION (STAT)

## 2025-02-05 PROCEDURE — 83735 ASSAY OF MAGNESIUM: CPT | Performed by: STUDENT IN AN ORGANIZED HEALTH CARE EDUCATION/TRAINING PROGRAM

## 2025-02-05 PROCEDURE — 25000003 PHARM REV CODE 250: Performed by: HOSPITALIST

## 2025-02-05 PROCEDURE — 94761 N-INVAS EAR/PLS OXIMETRY MLT: CPT

## 2025-02-05 PROCEDURE — 85025 COMPLETE CBC W/AUTO DIFF WBC: CPT | Performed by: STUDENT IN AN ORGANIZED HEALTH CARE EDUCATION/TRAINING PROGRAM

## 2025-02-05 RX ADMIN — LIDOCAINE 2 PATCH: 700 PATCH TOPICAL at 08:02

## 2025-02-05 RX ADMIN — CARVEDILOL 12.5 MG: 12.5 TABLET, FILM COATED ORAL at 06:02

## 2025-02-05 RX ADMIN — FUROSEMIDE 40 MG: 20 TABLET ORAL at 07:02

## 2025-02-05 RX ADMIN — ENOXAPARIN SODIUM 40 MG: 40 INJECTION SUBCUTANEOUS at 06:02

## 2025-02-05 RX ADMIN — ACETAMINOPHEN 650 MG: 325 TABLET ORAL at 07:02

## 2025-02-05 RX ADMIN — CARVEDILOL 12.5 MG: 12.5 TABLET, FILM COATED ORAL at 07:02

## 2025-02-05 RX ADMIN — ACETAMINOPHEN 650 MG: 325 TABLET ORAL at 08:02

## 2025-02-05 RX ADMIN — LEVOTHYROXINE SODIUM 88 MCG: 88 TABLET ORAL at 05:02

## 2025-02-05 RX ADMIN — LOSARTAN POTASSIUM 100 MG: 25 TABLET, FILM COATED ORAL at 07:02

## 2025-02-05 RX ADMIN — ASPIRIN 81 MG: 81 TABLET, COATED ORAL at 07:02

## 2025-02-05 NOTE — PLAN OF CARE
Problem: Adult Inpatient Plan of Care  Goal: Plan of Care Review  Outcome: Progressing  Goal: Patient-Specific Goal (Individualized)  Outcome: Progressing  Goal: Absence of Hospital-Acquired Illness or Injury  Outcome: Progressing  Goal: Optimal Comfort and Wellbeing  Outcome: Progressing  Goal: Readiness for Transition of Care  Outcome: Progressing     Problem: Skin Injury Risk Increased  Goal: Skin Health and Integrity  Outcome: Progressing     Problem: Fall Injury Risk  Goal: Absence of Fall and Fall-Related Injury  Outcome: Progressing     Problem: Pain Acute  Goal: Optimal Pain Control and Function  Outcome: Progressing   No acute events overnight. Pt VSS. Purposeful rounding complete. All questions answered. Call light within reach. Bed in locked and low position.

## 2025-02-05 NOTE — NURSING
Addended by: NELSY LOO on: 1/3/2022 10:18 AM     Modules accepted: Orders     Chart check completed

## 2025-02-05 NOTE — PT/OT/SLP PROGRESS
Physical Therapy Treatment    Patient Name:  Re Arciniega   MRN:  [id number]    Recommendations:     Discharge Recommendations: Moderate Intensity Therapy (SNF)  Discharge Equipment Recommendations:  (TBD)  Barriers to discharge:  Increased level of assistance, ongoing medical treatment    Assessment:     Re Arciniega is a 89 y.o. female admitted with a medical diagnosis of Right knee pain.  She presents with the following impairments/functional limitations: weakness, impaired self care skills, impaired functional mobility, gait instability, impaired balance, pain, decreased safety awareness.    Rehab Prognosis: Good; patient would benefit from acute skilled PT services to address these deficits and reach maximum level of function.    Recent Surgery: * No surgery found *      Plan:     During this hospitalization, patient to be seen 5 x/week to address the identified rehab impairments via gait training, therapeutic activities, therapeutic exercises and progress toward the following goals:    Plan of Care Expires:   (Upon discharge from the hospital.)    Subjective     Chief Complaint: The patient continues to complain of lower back and right knee pain with mobility.  Patient/Family Comments/goals: The patient would like to return to independent mobility.  Pain/Comfort:  Pain Rating 1: 7/10 (with movement)  Location - Side 1: Right  Location 1: knee  Pain Addressed 1: Nurse notified, Cessation of Activity  Pain Rating Post-Intervention 1: 7/10 (with movement)  Location - Orientation 2: lower  Location 2: back  Pain Addressed 2: Nurse notified      Objective:     Communicated with nurse prior to session.  Patient found supine with telemetry, PureWick, peripheral IV upon PT entry to room.     General Precautions: Standard, fall  Orthopedic Precautions: N/A  Braces: N/A  Respiratory Status: Room air    Functional Mobility:  Bed Mobility  Supine to Sit: moderate assistance for LE management, trunk management, and extra time  due to pain.  Transfers  Sit to Stand: moderate assistance with a rolling walker  Gait  The patient continues to struggle with standing up straight.  Today, she was never able to fully extend her hips or back.  A family member in the room reports the patient has been standing hunched over for a long time.  She did ambulate with the rolling walker and mod assist approximately 3 feet taking very small steps with decreased step height.       Balance    Sitting: SBA  Standing: minimal assistance with the rolling walker for static standing     Treatment & Education:  The patient was seen at the bedside for supine to sit transfer training with mod assist to the legs and trunk.  She required mod assist to scoot to the edge of the bed.  Once sitting, she could maintain her balance with SBA.  She received sit to stand transfer training from the bed to the rolling walker with mod assist to help lift her and to stabilize the walker.  Once standing, she was unable to stand fully erect.  She received gait training with the rolling walker and mod assist approximately 3 feet with a shuffling gait pattern.    Patient left supine with all lines intact, call button in reach, bed alarm on, nurse notified, and family present..    GOALS:   Multidisciplinary Problems       Physical Therapy Goals          Problem: Physical Therapy    Goal Priority Disciplines Outcome Interventions   Physical Therapy Goal     PT, PT/OT     Description: Goals    Patient to increase lower extremity strength by 1/2 muscle grade.  Patient to roll left and right with no assistance.  Patient to transfer supine <> sit with min assistance.  Patient to transfer bed <> chair via stand-pivot with min assistance.  Patient to ambulate with a rolling walker and mod assistance > 45 feet.                        DME Justifications:  No DME recommended requiring DME justifications    Time Tracking:     PT Received On: 02/05/25  PT Start Time: 1044     PT Stop Time: 1100  PT  Total Time (min): 16 min     Billable Minutes: Gait Training 16    Treatment Type: Treatment  PT/PTA: PT     Number of PTA visits since last PT visit: 0     02/05/2025

## 2025-02-05 NOTE — PLAN OF CARE
02/05/25 1432   Medicare Message   Important Message from Medicare regarding Discharge Appeal Rights Given to patient/caregiver;Explained to patient/caregiver;Signed/date by patient/caregiver   Date IMM was signed 02/05/25   Time IMM was signed 1422     Placed in folder

## 2025-02-05 NOTE — CARE UPDATE
02/05/25 0730   Patient Assessment/Suction   Level of Consciousness (AVPU) alert   Respiratory Effort Unlabored;Normal   Expansion/Accessory Muscles/Retractions no use of accessory muscles   All Lung Fields Breath Sounds Anterior:;Lateral:;clear;equal bilaterally   Rhythm/Pattern, Respiratory pattern regular;depth regular   Cough Frequency no cough   PRE-TX-O2   Device (Oxygen Therapy) room air   SpO2 97 %   Pulse Oximetry Type Intermittent   $ Pulse Oximetry - Multiple Charge Pulse Oximetry - Multiple   Pulse 64   Resp 14   Aerosol Therapy   $ Aerosol Therapy Charges PRN treatment not required   Respiratory Treatment Status (SVN) PRN treatment not required   Education   $ Education Bronchodilator;Oxygen;15 min

## 2025-02-05 NOTE — NURSING
Plan of care reviewed with pt. ASHA throughout shift. PRN pain medications provided as ordered. Low sodium diet. Up to beside commode. Purewick in place. Continue to Monitor Labs. VSS. Continuous telemetry monitor maintained. Safety maintained. Will continue to monitor. No complaints at this time. TB to be read tomorrow.

## 2025-02-06 LAB
ALBUMIN SERPL BCP-MCNC: 3 G/DL (ref 3.5–5.2)
ALP SERPL-CCNC: 80 U/L (ref 40–150)
ALT SERPL W/O P-5'-P-CCNC: 13 U/L (ref 10–44)
ANION GAP SERPL CALC-SCNC: 11 MMOL/L (ref 8–16)
AST SERPL-CCNC: 17 U/L (ref 10–40)
BASOPHILS # BLD AUTO: 0.03 K/UL (ref 0–0.2)
BASOPHILS NFR BLD: 0.7 % (ref 0–1.9)
BILIRUB SERPL-MCNC: 0.5 MG/DL (ref 0.1–1)
BUN SERPL-MCNC: 33 MG/DL (ref 10–30)
CALCIUM SERPL-MCNC: 9 MG/DL (ref 8.7–10.5)
CHLORIDE SERPL-SCNC: 108 MMOL/L (ref 95–110)
CO2 SERPL-SCNC: 23 MMOL/L (ref 23–29)
CREAT SERPL-MCNC: 1.2 MG/DL (ref 0.5–1.4)
DIFFERENTIAL METHOD BLD: ABNORMAL
EOSINOPHIL # BLD AUTO: 0.2 K/UL (ref 0–0.5)
EOSINOPHIL NFR BLD: 4.1 % (ref 0–8)
ERYTHROCYTE [DISTWIDTH] IN BLOOD BY AUTOMATED COUNT: 13.7 % (ref 11.5–14.5)
EST. GFR  (NO RACE VARIABLE): 41.9 ML/MIN/1.73 M^2
GLUCOSE SERPL-MCNC: 78 MG/DL (ref 70–110)
HCT VFR BLD AUTO: 34.6 % (ref 37–48.5)
HGB BLD-MCNC: 10.9 G/DL (ref 12–16)
IMM GRANULOCYTES # BLD AUTO: 0.02 K/UL (ref 0–0.04)
IMM GRANULOCYTES NFR BLD AUTO: 0.5 % (ref 0–0.5)
LYMPHOCYTES # BLD AUTO: 1.2 K/UL (ref 1–4.8)
LYMPHOCYTES NFR BLD: 28.2 % (ref 18–48)
MAGNESIUM SERPL-MCNC: 1.8 MG/DL (ref 1.6–2.6)
MCH RBC QN AUTO: 31.6 PG (ref 27–31)
MCHC RBC AUTO-ENTMCNC: 31.5 G/DL (ref 32–36)
MCV RBC AUTO: 100 FL (ref 82–98)
MONOCYTES # BLD AUTO: 0.6 K/UL (ref 0.3–1)
MONOCYTES NFR BLD: 12.5 % (ref 4–15)
NEUTROPHILS # BLD AUTO: 2.4 K/UL (ref 1.8–7.7)
NEUTROPHILS NFR BLD: 54 % (ref 38–73)
NRBC BLD-RTO: 0 /100 WBC
OHS QRS DURATION: 130 MS
OHS QTC CALCULATION: 513 MS
PHOSPHATE SERPL-MCNC: 3.2 MG/DL (ref 2.7–4.5)
PLATELET # BLD AUTO: 184 K/UL (ref 150–450)
PMV BLD AUTO: 10.6 FL (ref 9.2–12.9)
POTASSIUM SERPL-SCNC: 4.1 MMOL/L (ref 3.5–5.1)
PROT SERPL-MCNC: 6.2 G/DL (ref 6–8.4)
RBC # BLD AUTO: 3.45 M/UL (ref 4–5.4)
SODIUM SERPL-SCNC: 142 MMOL/L (ref 136–145)
TROPONIN I SERPL DL<=0.01 NG/ML-MCNC: 0.07 NG/ML (ref 0–0.03)
WBC # BLD AUTO: 4.39 K/UL (ref 3.9–12.7)

## 2025-02-06 PROCEDURE — 84484 ASSAY OF TROPONIN QUANT: CPT | Performed by: INTERNAL MEDICINE

## 2025-02-06 PROCEDURE — 83735 ASSAY OF MAGNESIUM: CPT | Performed by: STUDENT IN AN ORGANIZED HEALTH CARE EDUCATION/TRAINING PROGRAM

## 2025-02-06 PROCEDURE — 63600175 PHARM REV CODE 636 W HCPCS: Performed by: INTERNAL MEDICINE

## 2025-02-06 PROCEDURE — 80053 COMPREHEN METABOLIC PANEL: CPT | Performed by: STUDENT IN AN ORGANIZED HEALTH CARE EDUCATION/TRAINING PROGRAM

## 2025-02-06 PROCEDURE — 99232 SBSQ HOSP IP/OBS MODERATE 35: CPT | Mod: ,,, | Performed by: INTERNAL MEDICINE

## 2025-02-06 PROCEDURE — 94761 N-INVAS EAR/PLS OXIMETRY MLT: CPT

## 2025-02-06 PROCEDURE — 25000003 PHARM REV CODE 250: Performed by: HOSPITALIST

## 2025-02-06 PROCEDURE — 84100 ASSAY OF PHOSPHORUS: CPT | Performed by: STUDENT IN AN ORGANIZED HEALTH CARE EDUCATION/TRAINING PROGRAM

## 2025-02-06 PROCEDURE — 99900035 HC TECH TIME PER 15 MIN (STAT)

## 2025-02-06 PROCEDURE — 97116 GAIT TRAINING THERAPY: CPT

## 2025-02-06 PROCEDURE — 21400001 HC TELEMETRY ROOM

## 2025-02-06 PROCEDURE — 85025 COMPLETE CBC W/AUTO DIFF WBC: CPT | Performed by: STUDENT IN AN ORGANIZED HEALTH CARE EDUCATION/TRAINING PROGRAM

## 2025-02-06 RX ORDER — ENOXAPARIN SODIUM 100 MG/ML
30 INJECTION SUBCUTANEOUS EVERY 24 HOURS
Status: DISCONTINUED | OUTPATIENT
Start: 2025-02-06 | End: 2025-02-07 | Stop reason: HOSPADM

## 2025-02-06 RX ADMIN — ACETAMINOPHEN 650 MG: 325 TABLET ORAL at 03:02

## 2025-02-06 RX ADMIN — CARVEDILOL 12.5 MG: 12.5 TABLET, FILM COATED ORAL at 04:02

## 2025-02-06 RX ADMIN — LEVOTHYROXINE SODIUM 88 MCG: 88 TABLET ORAL at 05:02

## 2025-02-06 RX ADMIN — LOSARTAN POTASSIUM 100 MG: 25 TABLET, FILM COATED ORAL at 09:02

## 2025-02-06 RX ADMIN — LOSARTAN POTASSIUM 50 MG: 25 TABLET, FILM COATED ORAL at 08:02

## 2025-02-06 RX ADMIN — ASPIRIN 81 MG: 81 TABLET, COATED ORAL at 07:02

## 2025-02-06 RX ADMIN — LIDOCAINE 2 PATCH: 700 PATCH TOPICAL at 08:02

## 2025-02-06 RX ADMIN — ENOXAPARIN SODIUM 30 MG: 30 INJECTION SUBCUTANEOUS at 05:02

## 2025-02-06 RX ADMIN — ACETAMINOPHEN 650 MG: 325 TABLET ORAL at 07:02

## 2025-02-06 RX ADMIN — CARVEDILOL 12.5 MG: 12.5 TABLET, FILM COATED ORAL at 07:02

## 2025-02-06 RX ADMIN — FUROSEMIDE 40 MG: 20 TABLET ORAL at 09:02

## 2025-02-06 NOTE — PLAN OF CARE
Problem: Adult Inpatient Plan of Care  Goal: Plan of Care Review  Outcome: Progressing  Goal: Patient-Specific Goal (Individualized)  Outcome: Progressing  Goal: Absence of Hospital-Acquired Illness or Injury  Outcome: Progressing  Goal: Optimal Comfort and Wellbeing  Outcome: Progressing  Goal: Readiness for Transition of Care  Outcome: Progressing     Problem: Skin Injury Risk Increased  Goal: Skin Health and Integrity  Outcome: Progressing     Problem: Fall Injury Risk  Goal: Absence of Fall and Fall-Related Injury  Outcome: Progressing     Problem: Fall Injury Risk  Goal: Absence of Fall and Fall-Related Injury  Outcome: Progressing     Problem: Pain Acute  Goal: Optimal Pain Control and Function  Outcome: Progressing

## 2025-02-06 NOTE — PLAN OF CARE
Patient has a fracture of the Coccyx .  spoke with Raphael at [facility] and she stated that a note specified potentially intermediate age on the fracture and this was not skill able. The progress note needs to convey that there doctor believes the fracture is from more of an acute not old fall.

## 2025-02-06 NOTE — NURSING
Sat up in chair for an hour today before dinner , tolerated therapy well, daughter at bedside most of afternoon, ate 3 meals well set up only. Placement at SNF pending tomorrow TB read today.

## 2025-02-06 NOTE — HOSPITAL COURSE
Pt has been doing better with physical therapy. She has been accepted at [facility] and will being transferred there today to continue skilled rehab. Pt is very happy about this and family as well. Hopeful for discharge this afternoon.

## 2025-02-06 NOTE — ASSESSMENT & PLAN NOTE
Vitals:    02/05/25 0436 02/05/25 0753 02/05/25 1034 02/05/25 1605   BP: (!) 141/66 (!) 140/67 (!) 114/56 125/63    02/05/25 1735 02/05/25 1944 02/05/25 2339 02/06/25 0332   BP: (!) 147/68 (!) 109/54 134/65 133/68    02/06/25 0703 02/06/25 1200   BP: (!) 149/65 132/62        Continue carvedilol, furosemide, and losartan   Hold spironolactone given high risk for dehydration; resume if necessary   BP has been well controlled  Goal SBP<140, pt is at goal

## 2025-02-06 NOTE — RESPIRATORY THERAPY
02/05/25 1905   Patient Assessment/Suction   Level of Consciousness (AVPU) alert   Respiratory Effort Normal;Unlabored   Expansion/Accessory Muscles/Retractions no retractions;no use of accessory muscles   All Lung Fields Breath Sounds Anterior:;Posterior:;Lateral:;equal bilaterally;clear;diminished   Rhythm/Pattern, Respiratory depth regular;pattern regular;unlabored   Cough Frequency no cough   PRE-TX-O2   Device (Oxygen Therapy) room air   SpO2 98 %   Pulse Oximetry Type Intermittent   $ Pulse Oximetry - Multiple Charge Pulse Oximetry - Multiple   Pulse 69   Resp 17   Aerosol Therapy   $ Aerosol Therapy Charges PRN treatment not required   Daily Review of Necessity (SVN) completed   Respiratory Treatment Status (SVN) PRN treatment not required

## 2025-02-06 NOTE — ASSESSMENT & PLAN NOTE
Anemia is likely due to chronic disease due to Chronic Kidney Disease. Most recent hemoglobin and hematocrit are listed below.  Recent Labs     02/05/25  0441 02/06/25  0348   HGB 10.3* 10.9*   HCT 31.8* 34.6*       Plan  - Monitor serial CBC: Daily  - Transfuse PRBC if pt becomes hemodynamically unstable, symptomatic or H/H drops below 7/21.  - Pt has not received any PRBC transfusions to date  - Pt's anemia is currently stable  - Pt is not on anticoagulation

## 2025-02-06 NOTE — ASSESSMENT & PLAN NOTE
Patient has Abnormal Magnesium: hypomagnesemia. Will continue to monitor electrolytes closely. Will replace the affected electrolytes and repeat labs to be done after interventions completed. The patient's magnesium results have been reviewed and are listed below.  Recent Labs   Lab 02/05/25  0441   MG 1.9

## 2025-02-06 NOTE — ASSESSMENT & PLAN NOTE
Patient has Abnormal Magnesium: hypomagnesemia. Will continue to monitor electrolytes closely. Will replace the affected electrolytes and repeat labs to be done after interventions completed. The patient's magnesium results have been reviewed and are listed below.  Recent Labs   Lab 02/06/25  0348   MG 1.8

## 2025-02-06 NOTE — PROGRESS NOTES
"[facility] Medicine  Progress Note    Patient Name: Re Arciniega  MRN: [id number]  Patient Class: IP- Inpatient   Admission Date: 2/2/2025  Length of Stay: 3 days  Attending Physician: Hakan Tate MD  Primary Care Provider: Anthony Emanuel MD        Subjective     Principal Problem:Right knee pain        HPI:  The patient is a 89 y/o female with PMH of HTN, hypothyroidism, and anemia who presents with right knee pain and swelling. History is primarily obatined from patient's daughter and grand daughter. The patient fell about a week and a half ago. Fall was unwitnessed. She uses a rollator at home and fell down after leaning over to grab something. She does report pain in the buttock and knee. Family requesting admit for pain control and ortho eval and patient and family amenable to placement for PT of needed. Pain controlled while she is at rest but is not controlled when she attempts to move. Pt has no other complaints at this time.     Overview/Hospital Course:  Pt states that her knee pain has not improved. Her chest pain has resolved. She continues to have pain in her lower back. She is working with physical therapy and has made some progress since admission.     Interval History: Please see above    Review of Systems   Constitutional:  Positive for activity change.   HENT: Negative.     Respiratory: Negative.  Negative for shortness of breath.    Cardiovascular:  Negative for chest pain.   Gastrointestinal: Negative.    Musculoskeletal:         Pt has left knee pain and pain in her "butt" which is likely lower back pain. There is no radiation to her lower back pain.    Neurological:  Negative for dizziness.   Hematological: Negative.    Psychiatric/Behavioral: Negative.       Objective:     Vital Signs (Most Recent):  Temp: 98 °F (36.7 °C) (02/06/25 1200)  Pulse: 68 (02/06/25 1200)  Resp: 16 (02/06/25 1200)  BP: 132/62 (02/06/25 1200)  SpO2: 98 % (02/06/25 1200) Vital Signs (24h " Range):  Temp:  [97.8 °F (36.6 °C)-98.4 °F (36.9 °C)] 98 °F (36.7 °C)  Pulse:  [65-74] 68  Resp:  [16-18] 16  SpO2:  [97 %-98 %] 98 %  BP: (109-149)/(54-68) 132/62     Weight: 57.2 kg (126 lb 1.7 oz)  Body mass index is 20.35 kg/m².    Intake/Output Summary (Last 24 hours) at 2/6/2025 1527  Last data filed at 2/6/2025 0350  Gross per 24 hour   Intake --   Output 900 ml   Net -900 ml         Physical Exam  Constitutional:       General: She is not in acute distress.  Cardiovascular:      Rate and Rhythm: Normal rate.      Heart sounds: Murmur heard.      Comments: Pt has a blowing 3-4/6 BALDEV best at the LSB without radiation.   Pulmonary:      Effort: Pulmonary effort is normal. No respiratory distress.   Musculoskeletal:      Comments: Right knee wrapped. Pain in lower back without radiation.    Skin:     General: Skin is warm and dry.   Neurological:      Mental Status: She is alert.             Significant Labs: All pertinent labs within the past 24 hours have been reviewed.  CBC:   Recent Labs   Lab 02/05/25  0441 02/06/25  0348   WBC 4.21 4.39   HGB 10.3* 10.9*   HCT 31.8* 34.6*    184     CMP:   Recent Labs   Lab 02/05/25  0441 02/06/25  0348    142   K 3.9 4.1    108   CO2 26 23   GLU 74 78   BUN 27 33*   CREATININE 1.0 1.2   CALCIUM 8.7 9.0   PROT 5.9* 6.2   ALBUMIN 2.9* 3.0*   BILITOT 0.5 0.5   ALKPHOS 63 80   AST 14 17   ALT 7* 13   ANIONGAP 7* 11       Significant Imaging: I have reviewed all pertinent imaging results/findings within the past 24 hours.    CT PELVIS WO CONTRAST 02/02/2025  Impression:     Bones are profoundly osteopenic.  This limits sensitivity for acute nondisplaced fractures. Within constraints, no detectable acute fracture involving the hips. Age indeterminate minimally displaced fracture involving the distal sacrum/proximal coccyx.  Correlate with clinical findings      Assessment and Plan     * Right knee pain  S/P fall over a week ago and CT also noted: Minimally  displaced fracture involving the distal sacrum/proximal coccyx   Pt's symptoms and presentation are consistent with a nonoperative displaced fracture in the distal sacrum/proximal coccyx  Pt has benefitted from PT/OT and has progressed while in the hospital.   Pain is still present at rest, but more difficult to control with ambulation  Ortho consulted, they agree with assessment and that pt has no acute surgical intervention available    Pain control with tylenol, hydrocodone, and lidocaine patch   PT   Fall precautions  Pt will be going to skilled rehab at discharge to progress further.        Elevated troponin  Likely a chronic elevation in setting of CKD and severe AS  Flat on trend  ECG ordered  TTE if patient has new ecg findings  Denies any chest pain    All pertinent labs within the past 24 hours have been reviewed.    Troponin:   Recent Labs   Lab 02/06/25  0348   TROPONINI 0.065*         Debility  Patient with Chronic debility due to age-related physical debility. The patient's latest AMPAC (Activity Measure for Post Acute Care) Score is listed below.    AM-PAC Score - How much help does the patient need for each activity listed  Basic Mobility Total Score: 11  Turning over in bed (including adjusting bedclothes, sheets and blankets)?: A little  Sitting down on and standing up from a chair with arms (e.g., wheelchair, bedside commode, etc.): A lot  Moving from lying on back to sitting on the side of the bed?: A lot  Moving to and from a bed to a chair (including a wheelchair)?: A lot  Need to walk in hospital room?: Unable  Climbing 3-5 steps with a railing?: Unable    Plan  - Progressive mobility protocol initated  - PT/OT consulted  - Fall precautions in place            Hypomagnesemia  Patient has Abnormal Magnesium: hypomagnesemia. Will continue to monitor electrolytes closely. Will replace the affected electrolytes and repeat labs to be done after interventions completed. The patient's magnesium  results have been reviewed and are listed below.  Recent Labs   Lab 02/06/25  0348   MG 1.8        Anemia  Anemia is likely due to chronic disease due to Chronic Kidney Disease. Most recent hemoglobin and hematocrit are listed below.  Recent Labs     02/05/25  0441 02/06/25  0348   HGB 10.3* 10.9*   HCT 31.8* 34.6*       Plan  - Monitor serial CBC: Daily  - Transfuse PRBC if pt becomes hemodynamically unstable, symptomatic or H/H drops below 7/21.  - Pt has not received any PRBC transfusions to date  - Pt's anemia is currently stable  - Pt is not on anticoagulation      Frail elderly  Complicates care  SW/CM consult      Chronic fatigue  Pt has been benefiting from PT/OT therapy  Discharge for skilled rehab      Severe aortic valve stenosis  Echocardiogram with evidence of aortic stenosis that is severe . The patient's most recent echocardiogram result is listed below.     ECHOCARDIOGRAM 01/30/2025    Left Ventricle: The left ventricle is normal in size. Mildly increased wall thickness. Severe global hypokinesis present. There is severely reduced systolic function with a visually estimated ejection fraction of 20 - 25%. Ejection fraction by visual approximation is 20%. Global longitudinal strain is -7%. Reduced. There is diastolic dysfunction.    Right Ventricle: Normal right ventricular cavity size. Systolic function is severely reduced. TAPSE is 1.80 cm.    Left Atrium: Left atrium is moderately dilated.    Aortic Valve: The aortic valve is a trileaflet valve. Severely restricted motion. There is severe stenosis. Aortic valve area by VTI is 0.92 cm². Aortic valve peak velocity is 1.96 m/s. Mean gradient is 9 mmHg. The dimensionless index is 0.37. There is mild aortic regurgitation.    Mitral Valve: There is moderate regurgitation.    Tricuspid Valve: There is mild regurgitation.    Pulmonic Valve: There is mild regurgitation.    IVC/SVC: Normal venous pressure at 3 mmHg.    Pericardium: There is a small effusion.  No indication of cardiac tamponade.    Significant deterioation of LV function from LVEF of 45% in Echo of 7/2023. There is also progression of LAE.    Stage 3a chronic kidney disease  Creatine stable for now. BMP reviewed- noted Estimated Creatinine Clearance: 25.9 mL/min (based on SCr of 1.2 mg/dL). according to latest data.   Based on current GFR, CKD stage is stage 3 - GFR 30-59.    Monitor UOP and serial BMP and adjust therapy as needed.   Renally dose meds.   Avoid nephrotoxic medications and procedures.    Hypothyroidism  Continue levothyroxine  Pt has no evidence of hypo/hyper thyroid   TSH and free T4 are pending      Hypertension, greater than 10 years  Vitals:    02/05/25 0436 02/05/25 0753 02/05/25 1034 02/05/25 1605   BP: (!) 141/66 (!) 140/67 (!) 114/56 125/63    02/05/25 1735 02/05/25 1944 02/05/25 2339 02/06/25 0332   BP: (!) 147/68 (!) 109/54 134/65 133/68    02/06/25 0703 02/06/25 1200   BP: (!) 149/65 132/62        Continue carvedilol, furosemide, and losartan   Hold spironolactone given high risk for dehydration; resume if necessary   BP has been well controlled  Goal SBP<140, pt is at goal        VTE Risk Mitigation (From admission, onward)           Ordered     enoxaparin injection 30 mg  Daily         02/06/25 1537     IP VTE HIGH RISK PATIENT  Once         02/02/25 2123     Place sequential compression device  Until discontinued         02/02/25 2123                    Discharge Planning   ART: 2/7/2025     Code Status: DNR   Medical Readiness for Discharge Date:   Discharge Plan A: Skilled Nursing Facility              MEYER MD  Department of Hospital Medicine   [facility]

## 2025-02-06 NOTE — ASSESSMENT & PLAN NOTE
Creatine stable for now. BMP reviewed- noted Estimated Creatinine Clearance: 25.9 mL/min (based on SCr of 1.2 mg/dL). according to latest data.   Based on current GFR, CKD stage is stage 3 - GFR 30-59.    Monitor UOP and serial BMP and adjust therapy as needed.   Renally dose meds.   Avoid nephrotoxic medications and procedures.

## 2025-02-06 NOTE — ASSESSMENT & PLAN NOTE
Continue carvedilol, furosemide, and losartan   Hold spironolactone given high risk for dehydration; resume if necessary   BP has been well controlled  Goal SBP<140, pt is at goal

## 2025-02-06 NOTE — ASSESSMENT & PLAN NOTE
Likely a chronic elevation in setting of CKD and severe AS  Flat on trend  ECG ordered  TTE if patient has new ecg findings  Denies any chest pain    All pertinent labs within the past 24 hours have been reviewed.    Troponin:   Recent Labs   Lab 02/06/25  0348   TROPONINI 0.065*

## 2025-02-06 NOTE — ASSESSMENT & PLAN NOTE
Echocardiogram with evidence of aortic stenosis that is severe . The patient's most recent echocardiogram result is listed below.   No echocardiogram results found for the past 12 months     ECHOCARDIOGRAM 01/30/2025    Left Ventricle: The left ventricle is normal in size. Mildly increased wall thickness. Severe global hypokinesis present. There is severely reduced systolic function with a visually estimated ejection fraction of 20 - 25%. Ejection fraction by visual approximation is 20%. Global longitudinal strain is -7%. Reduced. There is diastolic dysfunction.    Right Ventricle: Normal right ventricular cavity size. Systolic function is severely reduced. TAPSE is 1.80 cm.    Left Atrium: Left atrium is moderately dilated.    Aortic Valve: The aortic valve is a trileaflet valve. Severely restricted motion. There is severe stenosis. Aortic valve area by VTI is 0.92 cm². Aortic valve peak velocity is 1.96 m/s. Mean gradient is 9 mmHg. The dimensionless index is 0.37. There is mild aortic regurgitation.    Mitral Valve: There is moderate regurgitation.    Tricuspid Valve: There is mild regurgitation.    Pulmonic Valve: There is mild regurgitation.    IVC/SVC: Normal venous pressure at 3 mmHg.    Pericardium: There is a small effusion. No indication of cardiac tamponade.    Significant deterioation of LV function from LVEF of 45% in Echo of 7/2023. There is also progression of LAE.

## 2025-02-06 NOTE — PROGRESS NOTES
[facility] Medicine  Progress Note    Patient Name: Re Arciniega  MRN: [id number]  Patient Class: IP- Inpatient   Admission Date: 2/2/2025  Length of Stay: 2 days  Attending Physician: Hakan Tate MD  Primary Care Provider: Anthony Emanuel MD        Subjective     Principal Problem:Right knee pain        HPI:  The patient is a 89 y/o female with PMH of HTN, hypothyroidism, and anemia who presents with right knee pain and swelling. History is primarily obatined from patient's daughter and grand daughter. The patient fell about a week and a half ago. Fall was unwitnessed. She uses a rollator at home and fell down after leaning over to grab something. She does report pain in the buttock and knee. Family requesting admit for pain control and ortho eval and patient and family amenable to placement for PT of needed. Pain controlled and no other complaints at this time.     Overview/Hospital Course:  Pt states that her knee pain has not improved. She has developed some dull but persistent chest pain this evening. Trying to treat with Tylenol and will get a Troponin in the AM. EKG shows LBBB which makes it undiagnostic.  Will continue to follow but do not plan on any further risk stratification.     Interval History: See above    Review of Systems   Constitutional:  Positive for activity change.   Respiratory:  Negative for shortness of breath.    Cardiovascular:  Negative for chest pain.   Musculoskeletal:         Per HPI   Neurological:  Negative for dizziness.     Objective:     Vital Signs (Most Recent):  Temp: 98.1 °F (36.7 °C) (02/05/25 1605)  Pulse: 73 (02/05/25 1605)  Resp: 16 (02/05/25 1605)  BP: 125/63 (02/05/25 1605)  SpO2: 97 % (02/05/25 1605) Vital Signs (24h Range):  Temp:  [97.5 °F (36.4 °C)-98.5 °F (36.9 °C)] 98.1 °F (36.7 °C)  Pulse:  [58-73] 73  Resp:  [14-20] 16  SpO2:  [95 %-97 %] 97 %  BP: (114-161)/(56-71) 125/63     Weight: 57.2 kg (126 lb 1.7 oz)  Body mass index is 20.35  kg/m².    Intake/Output Summary (Last 24 hours) at 2/5/2025 1820  Last data filed at 2/5/2025 1813  Gross per 24 hour   Intake 960 ml   Output 1700 ml   Net -740 ml         Physical Exam  Constitutional:       General: She is not in acute distress.  Cardiovascular:      Rate and Rhythm: Normal rate.   Pulmonary:      Effort: Pulmonary effort is normal. No respiratory distress.   Musculoskeletal:      Comments: Right knee wrapped   Neurological:      Mental Status: She is alert.             Significant Labs: All pertinent labs within the past 24 hours have been reviewed.  CBC:   Recent Labs   Lab 02/05/25  0441   WBC 4.21   HGB 10.3*   HCT 31.8*        CMP:   Recent Labs   Lab 02/04/25  1007 02/05/25  0441    141   K 3.8 3.9    108   CO2 25 26    74   BUN 27 27   CREATININE 1.0 1.0   CALCIUM 8.8 8.7   PROT 6.1 5.9*   ALBUMIN 3.0* 2.9*   BILITOT 0.6 0.5   ALKPHOS 66 63   AST 15 14   ALT 10 7*   ANIONGAP 8 7*       Significant Imaging: I have reviewed all pertinent imaging results/findings within the past 24 hours.    Assessment and Plan     * Right knee pain  S/P fall over a week ago and CT also noted: Age indeterminate minimally displaced fracture involving the distal sacrum/proximal coccyx   Admit for pain control and PT; possibly needing SNF placement  Ortho consulted- no acute intervention  Pain control with tylenol, hydrocodone, and lidocaine patch   PT   Fall precautions       Elevated troponin  Likely a chronic elevation in setting of CKD and severe AS  Flat on trend  ECG ordered  TTE if patient has new ecg findings  Denies any chest pain      Debility  Patient with Chronic debility due to age-related physical debility. The patient's latest AMPAC (Activity Measure for Post Acute Care) Score is listed below.    AM-PAC Score - How much help does the patient need for each activity listed  Basic Mobility Total Score: 11  Turning over in bed (including adjusting bedclothes, sheets and  blankets)?: A little  Sitting down on and standing up from a chair with arms (e.g., wheelchair, bedside commode, etc.): A lot  Moving from lying on back to sitting on the side of the bed?: A lot  Moving to and from a bed to a chair (including a wheelchair)?: A lot  Need to walk in hospital room?: Unable  Climbing 3-5 steps with a railing?: Unable    Plan  - Progressive mobility protocol initated  - PT/OT consulted  - Fall precautions in place            Hypomagnesemia  Patient has Abnormal Magnesium: hypomagnesemia. Will continue to monitor electrolytes closely. Will replace the affected electrolytes and repeat labs to be done after interventions completed. The patient's magnesium results have been reviewed and are listed below.  Recent Labs   Lab 02/05/25  0441   MG 1.9          Anemia  Anemia is likely due to chronic disease due to Chronic Kidney Disease. Most recent hemoglobin and hematocrit are listed below.  Recent Labs     02/03/25  0439 02/05/25  0441   HGB 10.2* 10.3*   HCT 32.2* 31.8*       Plan  - Monitor serial CBC: Daily  - Transfuse PRBC if patient becomes hemodynamically unstable, symptomatic or H/H drops below 7/21.  - Patient has not received any PRBC transfusions to date  - Patient's anemia is currently stable      Frail elderly  Complicates care  SW/CM consult      Chronic fatigue  PT/OT consult      Severe aortic valve stenosis  Echocardiogram with evidence of aortic stenosis that is severe . The patient's most recent echocardiogram result is listed below.   No echocardiogram results found for the past 12 months     ECHOCARDIOGRAM 01/30/2025    Left Ventricle: The left ventricle is normal in size. Mildly increased wall thickness. Severe global hypokinesis present. There is severely reduced systolic function with a visually estimated ejection fraction of 20 - 25%. Ejection fraction by visual approximation is 20%. Global longitudinal strain is -7%. Reduced. There is diastolic dysfunction.    Right  Ventricle: Normal right ventricular cavity size. Systolic function is severely reduced. TAPSE is 1.80 cm.    Left Atrium: Left atrium is moderately dilated.    Aortic Valve: The aortic valve is a trileaflet valve. Severely restricted motion. There is severe stenosis. Aortic valve area by VTI is 0.92 cm². Aortic valve peak velocity is 1.96 m/s. Mean gradient is 9 mmHg. The dimensionless index is 0.37. There is mild aortic regurgitation.    Mitral Valve: There is moderate regurgitation.    Tricuspid Valve: There is mild regurgitation.    Pulmonic Valve: There is mild regurgitation.    IVC/SVC: Normal venous pressure at 3 mmHg.    Pericardium: There is a small effusion. No indication of cardiac tamponade.    Significant deterioation of LV function from LVEF of 45% in Echo of 7/2023. There is also progression of LAE.    Stage 3a chronic kidney disease  Creatine stable for now. BMP reviewed- noted Estimated Creatinine Clearance: 31.1 mL/min (based on SCr of 1 mg/dL). according to latest data. Based on current GFR, CKD stage is stage 3 - GFR 30-59.  Monitor UOP and serial BMP and adjust therapy as needed. Renally dose meds. Avoid nephrotoxic medications and procedures.    Hypothyroidism  Continue levothyroxine  Pt has no evidence of hypo/hyper thyroid       Hypertension, greater than 10 years  Continue carvedilol, furosemide, and losartan   Hold spironolactone given high risk for dehydration; resume if necessary   BP has been well controlled  Goal SBP<140, pt is at goal        VTE Risk Mitigation (From admission, onward)           Ordered     enoxaparin injection 40 mg  Daily         02/04/25 0047     IP VTE HIGH RISK PATIENT  Once         02/02/25 2123     Place sequential compression device  Until discontinued         02/02/25 2123                    Discharge Planning   ART: 2/6/2025     Code Status: Full Code   Medical Readiness for Discharge Date:   Discharge Plan A: Skilled Nursing Facility        CODE STATUS: FULL  CODE    MEYER MD  Department of [facility] Medicine   [facility]

## 2025-02-06 NOTE — PLAN OF CARE
02/06/25 1610   Post-Acute Status   Post-Acute Authorization Placement     Faxed updated progress note to [facility] as requested.

## 2025-02-06 NOTE — CARE UPDATE
"Per nurse, BP (!) 109/54 (BP Location: Right arm, Patient Position: Lying)   Pulse 72   Temp 98.4 °F (36.9 °C)   Resp 16   Ht 5' 6" (1.676 m)   Wt 57.2 kg (126 lb 1.7 oz)   SpO2 97%   BMI 20.35 kg/m²       -Held Losartan due tonight due to soft BP  "

## 2025-02-06 NOTE — ASSESSMENT & PLAN NOTE
Anemia is likely due to chronic disease due to Chronic Kidney Disease. Most recent hemoglobin and hematocrit are listed below.  Recent Labs     02/03/25  0439 02/05/25  0441   HGB 10.2* 10.3*   HCT 32.2* 31.8*       Plan  - Monitor serial CBC: Daily  - Transfuse PRBC if patient becomes hemodynamically unstable, symptomatic or H/H drops below 7/21.  - Patient has not received any PRBC transfusions to date  - Patient's anemia is currently stable

## 2025-02-06 NOTE — SUBJECTIVE & OBJECTIVE
"Interval History: Please see above    Review of Systems   Constitutional:  Positive for activity change.   HENT: Negative.     Respiratory: Negative.  Negative for shortness of breath.    Cardiovascular:  Negative for chest pain.   Gastrointestinal: Negative.    Musculoskeletal:         Pt has left knee pain and pain in her "butt" which is likely lower back pain. There is no radiation to her lower back pain.    Neurological:  Negative for dizziness.   Hematological: Negative.    Psychiatric/Behavioral: Negative.       Objective:     Vital Signs (Most Recent):  Temp: 98 °F (36.7 °C) (02/06/25 1200)  Pulse: 68 (02/06/25 1200)  Resp: 16 (02/06/25 1200)  BP: 132/62 (02/06/25 1200)  SpO2: 98 % (02/06/25 1200) Vital Signs (24h Range):  Temp:  [97.8 °F (36.6 °C)-98.4 °F (36.9 °C)] 98 °F (36.7 °C)  Pulse:  [65-74] 68  Resp:  [16-18] 16  SpO2:  [97 %-98 %] 98 %  BP: (109-149)/(54-68) 132/62     Weight: 57.2 kg (126 lb 1.7 oz)  Body mass index is 20.35 kg/m².    Intake/Output Summary (Last 24 hours) at 2/6/2025 1527  Last data filed at 2/6/2025 0350  Gross per 24 hour   Intake --   Output 900 ml   Net -900 ml         Physical Exam  Constitutional:       General: She is not in acute distress.  Cardiovascular:      Rate and Rhythm: Normal rate.      Heart sounds: Murmur heard.      Comments: Pt has a blowing 3-4/6 BALDEV best at the LSB without radiation.   Pulmonary:      Effort: Pulmonary effort is normal. No respiratory distress.   Musculoskeletal:      Comments: Right knee wrapped. Pain in lower back without radiation.    Skin:     General: Skin is warm and dry.   Neurological:      Mental Status: She is alert.             Significant Labs: All pertinent labs within the past 24 hours have been reviewed.  CBC:   Recent Labs   Lab 02/05/25  0441 02/06/25  0348   WBC 4.21 4.39   HGB 10.3* 10.9*   HCT 31.8* 34.6*    184     CMP:   Recent Labs   Lab 02/05/25  0441 02/06/25  0348    142   K 3.9 4.1    108   CO2 26 " 23   GLU 74 78   BUN 27 33*   CREATININE 1.0 1.2   CALCIUM 8.7 9.0   PROT 5.9* 6.2   ALBUMIN 2.9* 3.0*   BILITOT 0.5 0.5   ALKPHOS 63 80   AST 14 17   ALT 7* 13   ANIONGAP 7* 11       Significant Imaging: I have reviewed all pertinent imaging results/findings within the past 24 hours.    CT PELVIS WO CONTRAST 02/02/2025  Impression:     Bones are profoundly osteopenic.  This limits sensitivity for acute nondisplaced fractures. Within constraints, no detectable acute fracture involving the hips. Age indeterminate minimally displaced fracture involving the distal sacrum/proximal coccyx.  Correlate with clinical findings

## 2025-02-06 NOTE — ASSESSMENT & PLAN NOTE
Likely a chronic elevation in setting of CKD and severe AS  Flat on trend  ECG ordered  TTE if patient has new ecg findings  Denies any chest pain

## 2025-02-06 NOTE — ASSESSMENT & PLAN NOTE
S/P fall over a week ago and CT also noted: Minimally displaced fracture involving the distal sacrum/proximal coccyx   Pt's symptoms and presentation are consistent with a nonoperative displaced fracture in the distal sacrum/proximal coccyx  Pt has benefitted from PT/OT and has progressed while in the hospital.   Pain is still present at rest, but more difficult to control with ambulation  Ortho consulted, they agree with assessment and that pt has no acute surgical intervention available    Pain control with tylenol, hydrocodone, and lidocaine patch   PT   Fall precautions  Pt will be going to skilled rehab at discharge to progress further.

## 2025-02-06 NOTE — SUBJECTIVE & OBJECTIVE
Interval History: See above    Review of Systems   Constitutional:  Positive for activity change.   Respiratory:  Negative for shortness of breath.    Cardiovascular:  Negative for chest pain.   Musculoskeletal:         Per HPI   Neurological:  Negative for dizziness.     Objective:     Vital Signs (Most Recent):  Temp: 98.1 °F (36.7 °C) (02/05/25 1605)  Pulse: 73 (02/05/25 1605)  Resp: 16 (02/05/25 1605)  BP: 125/63 (02/05/25 1605)  SpO2: 97 % (02/05/25 1605) Vital Signs (24h Range):  Temp:  [97.5 °F (36.4 °C)-98.5 °F (36.9 °C)] 98.1 °F (36.7 °C)  Pulse:  [58-73] 73  Resp:  [14-20] 16  SpO2:  [95 %-97 %] 97 %  BP: (114-161)/(56-71) 125/63     Weight: 57.2 kg (126 lb 1.7 oz)  Body mass index is 20.35 kg/m².    Intake/Output Summary (Last 24 hours) at 2/5/2025 1820  Last data filed at 2/5/2025 1813  Gross per 24 hour   Intake 960 ml   Output 1700 ml   Net -740 ml         Physical Exam  Constitutional:       General: She is not in acute distress.  Cardiovascular:      Rate and Rhythm: Normal rate.   Pulmonary:      Effort: Pulmonary effort is normal. No respiratory distress.   Musculoskeletal:      Comments: Right knee wrapped   Neurological:      Mental Status: She is alert.             Significant Labs: All pertinent labs within the past 24 hours have been reviewed.  CBC:   Recent Labs   Lab 02/05/25  0441   WBC 4.21   HGB 10.3*   HCT 31.8*        CMP:   Recent Labs   Lab 02/04/25  1007 02/05/25  0441    141   K 3.8 3.9    108   CO2 25 26    74   BUN 27 27   CREATININE 1.0 1.0   CALCIUM 8.8 8.7   PROT 6.1 5.9*   ALBUMIN 3.0* 2.9*   BILITOT 0.6 0.5   ALKPHOS 66 63   AST 15 14   ALT 10 7*   ANIONGAP 8 7*       Significant Imaging: I have reviewed all pertinent imaging results/findings within the past 24 hours.

## 2025-02-06 NOTE — ASSESSMENT & PLAN NOTE
Echocardiogram with evidence of aortic stenosis that is severe . The patient's most recent echocardiogram result is listed below.     ECHOCARDIOGRAM 01/30/2025    Left Ventricle: The left ventricle is normal in size. Mildly increased wall thickness. Severe global hypokinesis present. There is severely reduced systolic function with a visually estimated ejection fraction of 20 - 25%. Ejection fraction by visual approximation is 20%. Global longitudinal strain is -7%. Reduced. There is diastolic dysfunction.    Right Ventricle: Normal right ventricular cavity size. Systolic function is severely reduced. TAPSE is 1.80 cm.    Left Atrium: Left atrium is moderately dilated.    Aortic Valve: The aortic valve is a trileaflet valve. Severely restricted motion. There is severe stenosis. Aortic valve area by VTI is 0.92 cm². Aortic valve peak velocity is 1.96 m/s. Mean gradient is 9 mmHg. The dimensionless index is 0.37. There is mild aortic regurgitation.    Mitral Valve: There is moderate regurgitation.    Tricuspid Valve: There is mild regurgitation.    Pulmonic Valve: There is mild regurgitation.    IVC/SVC: Normal venous pressure at 3 mmHg.    Pericardium: There is a small effusion. No indication of cardiac tamponade.    Significant deterioation of LV function from LVEF of 45% in Echo of 7/2023. There is also progression of LAE.

## 2025-02-06 NOTE — PT/OT/SLP PROGRESS
Physical Therapy Treatment    Patient Name:  Re Arciniega   MRN:  [id number]    Recommendations:     Discharge Recommendations: Moderate Intensity Therapy (SNF)  Discharge Equipment Recommendations:  (TBD)  Barriers to discharge:  Increased level of assistance, ongoing medical treatment    Assessment:     Re Arciniega is a 89 y.o. female admitted with a medical diagnosis of Right knee pain.  She presents with the following impairments/functional limitations: weakness, impaired self care skills, impaired functional mobility, gait instability, impaired balance, pain, decreased safety awareness.    Rehab Prognosis: Good; patient would benefit from acute skilled PT services to address these deficits and reach maximum level of function.    Recent Surgery: * No surgery found *      Plan:     During this hospitalization, patient to be seen 5 x/week to address the identified rehab impairments via gait training, therapeutic activities, therapeutic exercises and progress toward the following goals:    Plan of Care Expires:   (Upon discharge from the hospital.)    Subjective     Chief Complaint: The patient continues to complain of severe pain in the lower back and the right knee with movement.  Patient/Family Comments/goals: The patient would like to return to independent mobility.  Pain/Comfort:  Pain Rating 1: 8/10  Location - Side 1: Right  Location 1: knee  Pain Rating 2: 8/10  Location - Orientation 2: lower  Location 2: back      Objective:     Communicated with nurse prior to session.  Patient found supine with telemetry, PureWick, peripheral IV upon PT entry to room.     General Precautions: Standard, fall  Orthopedic Precautions: N/A  Braces: N/A  Respiratory Status: Room air     Bed Mobility  Supine to Sit: minimal assistance for LE management, trunk management, and extra time due to pain.  Transfers  Sit to Stand: moderate assistance with a rolling walker  Gait  The patient continues to struggle with standing up  straight.  She is still not able to fully extend her hips or back.  She did ambulate with the rolling walker and mod assist approximately 5 feet taking very small steps with decreased step height.       Balance     Sitting: SBA  Standing: minimal assistance with the rolling walker for static standing     Treatment & Education:  The patient was seen at the bedside for supine to sit transfer training with min assist to the legs and trunk.  She required min assist to scoot to the edge of the bed.  Once sitting, she could maintain her balance with SBA.  She received sit to stand transfer training from the bed to the rolling walker with mod assist to help lift her and to stabilize the walker.  Once standing, she was unable to stand fully erect.  She received gait training with the rolling walker and mod assist approximately 5 feet with a shuffling gait pattern.    Patient left supine with all lines intact, call button in reach, bed alarm on, nurse notified, and family present..    GOALS:   Multidisciplinary Problems       Physical Therapy Goals          Problem: Physical Therapy    Goal Priority Disciplines Outcome Interventions   Physical Therapy Goal     PT, PT/OT     Description: Goals    Patient to increase lower extremity strength by 1/2 muscle grade.  Patient to roll left and right with no assistance.  Patient to transfer supine <> sit with min assistance.  Patient to transfer bed <> chair via stand-pivot with min assistance.  Patient to ambulate with a rolling walker and mod assistance > 45 feet.                        DME Justifications:  No DME recommended requiring DME justifications    Time Tracking:     PT Received On: 02/06/25  PT Start Time: 0955     PT Stop Time: 1010  PT Total Time (min): 15 min     Billable Minutes: Gait Training 15    Treatment Type: Treatment  PT/PTA: PT     Number of PTA visits since last PT visit: 0     02/06/2025

## 2025-02-07 VITALS
HEART RATE: 63 BPM | DIASTOLIC BLOOD PRESSURE: 55 MMHG | SYSTOLIC BLOOD PRESSURE: 110 MMHG | OXYGEN SATURATION: 95 % | HEIGHT: 66 IN | RESPIRATION RATE: 19 BRPM | WEIGHT: 126.13 LBS | TEMPERATURE: 98 F | BODY MASS INDEX: 20.27 KG/M2

## 2025-02-07 LAB
ALBUMIN SERPL BCP-MCNC: 3.1 G/DL (ref 3.5–5.2)
ALP SERPL-CCNC: 83 U/L (ref 40–150)
ALT SERPL W/O P-5'-P-CCNC: 11 U/L (ref 10–44)
ANION GAP SERPL CALC-SCNC: 10 MMOL/L (ref 8–16)
AST SERPL-CCNC: 21 U/L (ref 10–40)
BASOPHILS # BLD AUTO: 0.02 K/UL (ref 0–0.2)
BASOPHILS NFR BLD: 0.4 % (ref 0–1.9)
BILIRUB SERPL-MCNC: 0.5 MG/DL (ref 0.1–1)
BUN SERPL-MCNC: 36 MG/DL (ref 10–30)
CALCIUM SERPL-MCNC: 9 MG/DL (ref 8.7–10.5)
CHLORIDE SERPL-SCNC: 107 MMOL/L (ref 95–110)
CO2 SERPL-SCNC: 25 MMOL/L (ref 23–29)
CREAT SERPL-MCNC: 1.1 MG/DL (ref 0.5–1.4)
DIFFERENTIAL METHOD BLD: ABNORMAL
EOSINOPHIL # BLD AUTO: 0.2 K/UL (ref 0–0.5)
EOSINOPHIL NFR BLD: 3.6 % (ref 0–8)
ERYTHROCYTE [DISTWIDTH] IN BLOOD BY AUTOMATED COUNT: 13.5 % (ref 11.5–14.5)
EST. GFR  (NO RACE VARIABLE): 46.6 ML/MIN/1.73 M^2
GLUCOSE SERPL-MCNC: 108 MG/DL (ref 70–110)
HCT VFR BLD AUTO: 35.6 % (ref 37–48.5)
HGB BLD-MCNC: 11.5 G/DL (ref 12–16)
IMM GRANULOCYTES # BLD AUTO: 0.02 K/UL (ref 0–0.04)
IMM GRANULOCYTES NFR BLD AUTO: 0.4 % (ref 0–0.5)
LYMPHOCYTES # BLD AUTO: 1.6 K/UL (ref 1–4.8)
LYMPHOCYTES NFR BLD: 31.5 % (ref 18–48)
MAGNESIUM SERPL-MCNC: 1.7 MG/DL (ref 1.6–2.6)
MCH RBC QN AUTO: 32 PG (ref 27–31)
MCHC RBC AUTO-ENTMCNC: 32.3 G/DL (ref 32–36)
MCV RBC AUTO: 99 FL (ref 82–98)
MONOCYTES # BLD AUTO: 0.5 K/UL (ref 0.3–1)
MONOCYTES NFR BLD: 9.2 % (ref 4–15)
NEUTROPHILS # BLD AUTO: 2.7 K/UL (ref 1.8–7.7)
NEUTROPHILS NFR BLD: 54.9 % (ref 38–73)
NRBC BLD-RTO: 0 /100 WBC
PHOSPHATE SERPL-MCNC: 3.5 MG/DL (ref 2.7–4.5)
PLATELET # BLD AUTO: 178 K/UL (ref 150–450)
PMV BLD AUTO: 10 FL (ref 9.2–12.9)
POTASSIUM SERPL-SCNC: 4.2 MMOL/L (ref 3.5–5.1)
PROT SERPL-MCNC: 6.4 G/DL (ref 6–8.4)
RBC # BLD AUTO: 3.59 M/UL (ref 4–5.4)
SODIUM SERPL-SCNC: 142 MMOL/L (ref 136–145)
T4 FREE SERPL-MCNC: 0.99 NG/DL (ref 0.71–1.51)
TSH SERPL DL<=0.005 MIU/L-ACNC: 13.58 UIU/ML (ref 0.4–4)
WBC # BLD AUTO: 4.98 K/UL (ref 3.9–12.7)

## 2025-02-07 PROCEDURE — 94761 N-INVAS EAR/PLS OXIMETRY MLT: CPT

## 2025-02-07 PROCEDURE — 84443 ASSAY THYROID STIM HORMONE: CPT | Performed by: INTERNAL MEDICINE

## 2025-02-07 PROCEDURE — 63600175 PHARM REV CODE 636 W HCPCS: Performed by: INTERNAL MEDICINE

## 2025-02-07 PROCEDURE — 85025 COMPLETE CBC W/AUTO DIFF WBC: CPT | Performed by: STUDENT IN AN ORGANIZED HEALTH CARE EDUCATION/TRAINING PROGRAM

## 2025-02-07 PROCEDURE — 84439 ASSAY OF FREE THYROXINE: CPT | Performed by: INTERNAL MEDICINE

## 2025-02-07 PROCEDURE — 25000003 PHARM REV CODE 250: Performed by: HOSPITALIST

## 2025-02-07 PROCEDURE — 84100 ASSAY OF PHOSPHORUS: CPT | Performed by: STUDENT IN AN ORGANIZED HEALTH CARE EDUCATION/TRAINING PROGRAM

## 2025-02-07 PROCEDURE — 80053 COMPREHEN METABOLIC PANEL: CPT | Performed by: STUDENT IN AN ORGANIZED HEALTH CARE EDUCATION/TRAINING PROGRAM

## 2025-02-07 PROCEDURE — 99239 HOSP IP/OBS DSCHRG MGMT >30: CPT | Mod: ,,, | Performed by: INTERNAL MEDICINE

## 2025-02-07 PROCEDURE — 83735 ASSAY OF MAGNESIUM: CPT | Performed by: STUDENT IN AN ORGANIZED HEALTH CARE EDUCATION/TRAINING PROGRAM

## 2025-02-07 RX ORDER — ACETAMINOPHEN 325 MG/1
650 TABLET ORAL EVERY 4 HOURS PRN
Qty: 50 TABLET | Refills: 0 | Status: SHIPPED | OUTPATIENT
Start: 2025-02-07 | End: 2025-03-09

## 2025-02-07 RX ORDER — LIDOCAINE 50 MG/G
2 PATCH TOPICAL DAILY
Qty: 60 PATCH | Refills: 0 | Status: SHIPPED | OUTPATIENT
Start: 2025-02-07 | End: 2025-02-07

## 2025-02-07 RX ORDER — LIDOCAINE 50 MG/G
2 PATCH TOPICAL DAILY
Qty: 60 PATCH | Refills: 0 | Status: SHIPPED | OUTPATIENT
Start: 2025-02-07 | End: 2025-03-09

## 2025-02-07 RX ORDER — HYDROCODONE BITARTRATE AND ACETAMINOPHEN 5; 325 MG/1; MG/1
1 TABLET ORAL EVERY 4 HOURS PRN
Qty: 20 TABLET | Refills: 0 | Status: SHIPPED | OUTPATIENT
Start: 2025-02-07 | End: 2025-02-14

## 2025-02-07 RX ORDER — ACETAMINOPHEN 325 MG/1
650 TABLET ORAL EVERY 4 HOURS PRN
Qty: 50 TABLET | Refills: 0 | Status: SHIPPED | OUTPATIENT
Start: 2025-02-07 | End: 2025-02-07

## 2025-02-07 RX ORDER — MAGNESIUM SULFATE 1 G/100ML
1 INJECTION INTRAVENOUS ONCE
Status: COMPLETED | OUTPATIENT
Start: 2025-02-07 | End: 2025-02-07

## 2025-02-07 RX ORDER — HYDROCODONE BITARTRATE AND ACETAMINOPHEN 5; 325 MG/1; MG/1
1 TABLET ORAL EVERY 4 HOURS PRN
Qty: 20 TABLET | Refills: 0 | Status: SHIPPED | OUTPATIENT
Start: 2025-02-07 | End: 2025-02-07

## 2025-02-07 RX ADMIN — LOSARTAN POTASSIUM 100 MG: 25 TABLET, FILM COATED ORAL at 09:02

## 2025-02-07 RX ADMIN — LEVOTHYROXINE SODIUM 88 MCG: 88 TABLET ORAL at 04:02

## 2025-02-07 RX ADMIN — MAGNESIUM SULFATE HEPTAHYDRATE 1 G: 1 INJECTION, SOLUTION INTRAVENOUS at 04:02

## 2025-02-07 RX ADMIN — HYDROCODONE BITARTRATE AND ACETAMINOPHEN 1 TABLET: 5; 325 TABLET ORAL at 02:02

## 2025-02-07 RX ADMIN — ASPIRIN 81 MG: 81 TABLET, COATED ORAL at 07:02

## 2025-02-07 RX ADMIN — HYDROCODONE BITARTRATE AND ACETAMINOPHEN 1 TABLET: 5; 325 TABLET ORAL at 09:02

## 2025-02-07 RX ADMIN — CARVEDILOL 12.5 MG: 12.5 TABLET, FILM COATED ORAL at 07:02

## 2025-02-07 RX ADMIN — FUROSEMIDE 40 MG: 20 TABLET ORAL at 09:02

## 2025-02-07 NOTE — ASSESSMENT & PLAN NOTE
Pt has been benefiting from PT/OT therapy  Discharge for skilled rehab to continue PT/OT therapy.

## 2025-02-07 NOTE — ASSESSMENT & PLAN NOTE
Echocardiogram with evidence of aortic stenosis that is severe . The patient's most recent echocardiogram result is listed below.     ECHOCARDIOGRAM 01/30/2025    Left Ventricle: The left ventricle is normal in size. Mildly increased wall thickness. Severe global hypokinesis present. There is severely reduced systolic function with a visually estimated ejection fraction of 20 - 25%. Ejection fraction by visual approximation is 20%. Global longitudinal strain is -7%. Reduced. There is diastolic dysfunction.    Right Ventricle: Normal right ventricular cavity size. Systolic function is severely reduced. TAPSE is 1.80 cm.    Left Atrium: Left atrium is moderately dilated.    Aortic Valve: The aortic valve is a trileaflet valve. Severely restricted motion. There is severe stenosis. Aortic valve area by VTI is 0.92 cm². Aortic valve peak velocity is 1.96 m/s. Mean gradient is 9 mmHg. The dimensionless index is 0.37. There is mild aortic regurgitation.    Mitral Valve: There is moderate regurgitation.    Tricuspid Valve: There is mild regurgitation.    Pulmonic Valve: There is mild regurgitation.    IVC/SVC: Normal venous pressure at 3 mmHg.    Pericardium: There is a small effusion. No indication of cardiac tamponade.    Significant deterioation of LV function from LVEF of 45% in Echo of 7/2023. There is also progression of LAE.    Discussed treatment options for the Aortic stenosis including TAVR, but family and pt monge not want any type of invasive treatment.

## 2025-02-07 NOTE — PLAN OF CARE
Problem: Adult Inpatient Plan of Care  Goal: Plan of Care Review  Outcome: Met  Goal: Patient-Specific Goal (Individualized)  Outcome: Met  Goal: Absence of Hospital-Acquired Illness or Injury  Outcome: Met  Goal: Optimal Comfort and Wellbeing  Outcome: Met  Goal: Readiness for Transition of Care  Outcome: Met     Problem: Skin Injury Risk Increased  Goal: Skin Health and Integrity  Outcome: Met     Problem: Fall Injury Risk  Goal: Absence of Fall and Fall-Related Injury  Outcome: Met     Problem: Pain Acute  Goal: Optimal Pain Control and Function  Outcome: Met

## 2025-02-07 NOTE — ASSESSMENT & PLAN NOTE
Patient has Abnormal Magnesium: hypomagnesemia. Will continue to monitor electrolytes closely. Will replace the affected electrolytes and repeat labs to be done after interventions completed. The patient's magnesium results have been reviewed and are listed below.  Recent Labs   Lab 02/07/25  0208   MG 1.7

## 2025-02-07 NOTE — NURSING
"0130>Messaged Dr. Mcclain: " Her last mag was 1.8. She doesn't have the electrolyte prn order panel. Would you like it? I can't see where she got mag today. Thanks!"  Dr. Mcclain said wait on AM labwork then go from there whether to give mag or not.   "

## 2025-02-07 NOTE — ASSESSMENT & PLAN NOTE
Vitals:    02/06/25 0703 02/06/25 1200 02/06/25 1600 02/06/25 1644   BP: (!) 149/65 132/62 (!) 153/70 (!) 153/70    02/06/25 1944 02/06/25 2053 02/06/25 2056 02/07/25 0121   BP: (!) 144/66 (!) 158/70 (!) 158/70 136/68    02/07/25 0351 02/07/25 0750   BP: 126/63 136/65        Continue carvedilol, furosemide, and losartan   Hold spironolactone given high risk for dehydration; resume if necessary   BP has been well controlled  Goal SBP<140, pt is slightly above goal, but continued treatment and pain control may bring that down.

## 2025-02-07 NOTE — DISCHARGE SUMMARY
[facility] Medicine  Discharge Summary      Patient Name: Re Arciniega  MRN: [id number]  JESSICA: [id number]  Patient Class: IP- Inpatient  Admission Date: 2/2/2025  Hospital Length of Stay: 4 days  Discharge Date and Time:  02/07/2025 10:40 AM  Attending Physician: Hakan Tate MD   Discharging Provider: MEYER MD  Primary Care Provider: Anthony Emanuel MD    Primary Care Team: Networked reference to record PCT     HPI:   The patient is a 89 y/o female with PMH of HTN, hypothyroidism, and anemia who presents with right knee pain and swelling. History is primarily obatined from patient's daughter and grand daughter. The patient fell about a week and a half ago. Fall was unwitnessed. She uses a rollator at home and fell down after leaning over to grab something. She does report pain in the buttock and knee. Family requesting admit for pain control and ortho eval and patient and family amenable to placement for PT of needed. Pain controlled while she is at rest but is not controlled when she attempts to move. Pt has no other complaints at this time.     * No surgery found *      Hospital Course:   Pt has been doing better with physical therapy. She has been accepted at [facility] and will being transferred there today to continue skilled rehab. Pt is very happy about this and family as well. Hopeful for discharge this afternoon.      Goals of Care Treatment Preferences:  Code Status: DNR      SDOH Screening:  The patient was screened for utility difficulties, food insecurity, transport difficulties, housing insecurity, and interpersonal safety and there were no concerns identified this admission.     Consults:   Consults (From admission, onward)          Status Ordering Provider     Inpatient consult to Orthopedic Surgery  Once        Provider:  Deep Kellogg MD    Acknowledged ABISAI BARTON     Inpatient consult to   Once        Provider:  (Not yet assigned)     Completed ABISAI BARTON            * Right knee pain  S/P fall over a week ago and CT noted: Minimally displaced fracture involving the distal sacrum/proximal coccyx   Pt's symptoms and presentation are consistent with a displaced fracture in the distal sacrum/proximal coccyx that is non operative.   Pt has benefitted from PT/OT and has progressed while in the hospital.   Pain is still present at rest, but more difficult to control with ambulation  Ortho consulted, they agree with assessment and that pt has no acute surgical intervention available    Pain control with tylenol, hydrocodone, and lidocaine patch   These will be continued post discharge  PT   Fall precautions  Pt will be going to skilled rehab at discharge to progress further.        Elevated troponin  Likely a chronic elevation in setting of CKD and severe AS  Flat on trend  ECG showed no acute changes  TTE results as above  Denies any chest pain    All pertinent labs within the past 24 hours have been reviewed.    Troponin:   Recent Labs   Lab 02/06/25  0348   TROPONINI 0.065*       Given goals of care and pt/famly wishes no further risk stratification    Debility  Patient with Chronic debility due to age-related physical debility. The patient's latest AMPAC (Activity Measure for Post Acute Care) Score is listed below.    AM-PAC Score - How much help does the patient need for each activity listed  Basic Mobility Total Score: 11  Turning over in bed (including adjusting bedclothes, sheets and blankets)?: A little  Sitting down on and standing up from a chair with arms (e.g., wheelchair, bedside commode, etc.): A lot  Moving from lying on back to sitting on the side of the bed?: A lot  Moving to and from a bed to a chair (including a wheelchair)?: A lot  Need to walk in hospital room?: Unable  Climbing 3-5 steps with a railing?: Unable    Plan  - Progressive mobility protocol initated  - PT/OT continued at SNF  - Fall precautions in  place            Hypomagnesemia  Patient has Abnormal Magnesium: hypomagnesemia. Will continue to monitor electrolytes closely. Will replace the affected electrolytes and repeat labs to be done after interventions completed. The patient's magnesium results have been reviewed and are listed below.  Recent Labs   Lab 02/07/25  0208   MG 1.7          Anemia  Anemia is likely due to chronic disease due to Chronic Kidney Disease. Most recent hemoglobin and hematocrit are listed below.  Recent Labs     02/05/25  0441 02/06/25  0348 02/07/25  0208   HGB 10.3* 10.9* 11.5*   HCT 31.8* 34.6* 35.6*       Plan  - Pt's anemia is currently stable  - Pt is on iron supplements  - Pt is not on anticoagulation  - No further treatment at this time      Frail elderly  Complicates care  SW/CM consult      Chronic fatigue  Pt has been benefiting from PT/OT therapy  Discharge for skilled rehab to continue PT/OT therapy.       Severe aortic valve stenosis  Echocardiogram with evidence of aortic stenosis that is severe . The patient's most recent echocardiogram result is listed below.     ECHOCARDIOGRAM 01/30/2025    Left Ventricle: The left ventricle is normal in size. Mildly increased wall thickness. Severe global hypokinesis present. There is severely reduced systolic function with a visually estimated ejection fraction of 20 - 25%. Ejection fraction by visual approximation is 20%. Global longitudinal strain is -7%. Reduced. There is diastolic dysfunction.    Right Ventricle: Normal right ventricular cavity size. Systolic function is severely reduced. TAPSE is 1.80 cm.    Left Atrium: Left atrium is moderately dilated.    Aortic Valve: The aortic valve is a trileaflet valve. Severely restricted motion. There is severe stenosis. Aortic valve area by VTI is 0.92 cm². Aortic valve peak velocity is 1.96 m/s. Mean gradient is 9 mmHg. The dimensionless index is 0.37. There is mild aortic regurgitation.    Mitral Valve: There is moderate  regurgitation.    Tricuspid Valve: There is mild regurgitation.    Pulmonic Valve: There is mild regurgitation.    IVC/SVC: Normal venous pressure at 3 mmHg.    Pericardium: There is a small effusion. No indication of cardiac tamponade.    Significant deterioation of LV function from LVEF of 45% in Echo of 7/2023. There is also progression of LAE.    Discussed treatment options for the Aortic stenosis including TAVR, but family and pt monge not want any type of invasive treatment.     Stage 3a chronic kidney disease  Creatine stable for now. BMP reviewed- noted Estimated Creatinine Clearance: 28.2 mL/min (based on SCr of 1.1 mg/dL). according to latest data.   Based on current GFR, CKD stage is stage 3 - GFR 30-59.    Monitor UOP and serial BMP and adjust therapy as needed.   Renally dose meds.   Avoid nephrotoxic medications and procedures.    Hypothyroidism  Continue levothyroxine  Pt has no evidence of hypo/hyper thyroidism   TSH 13.5, free T4 0.99      Hypertension, greater than 10 years  Vitals:    02/06/25 0703 02/06/25 1200 02/06/25 1600 02/06/25 1644   BP: (!) 149/65 132/62 (!) 153/70 (!) 153/70    02/06/25 1944 02/06/25 2053 02/06/25 2056 02/07/25 0121   BP: (!) 144/66 (!) 158/70 (!) 158/70 136/68    02/07/25 0351 02/07/25 0750   BP: 126/63 136/65        Continue carvedilol, furosemide, and losartan   Hold spironolactone given high risk for dehydration; resume if necessary   BP has been well controlled  Goal SBP<140, pt is slightly above goal, but continued treatment and pain control may bring that down.         Final Active Diagnoses:    Diagnosis Date Noted POA    PRINCIPAL PROBLEM:  Right knee pain [M25.561] 02/02/2025 Yes    Hypomagnesemia [E83.42] 02/03/2025 Yes    Debility [R53.81] 02/03/2025 Yes    Elevated troponin [R79.89] 02/03/2025 Yes    Anemia [D64.9] 01/17/2025 Yes    Frail elderly [R54] 01/17/2025 Yes    Chronic fatigue [R53.82] 07/12/2024 Yes    Severe aortic valve stenosis [I35.0] 03/13/2024  Yes    Stage 3a chronic kidney disease [N18.31] 03/13/2024 Yes    Hypothyroidism [E03.9] 01/27/2024 Yes    Hypertension, greater than 10 years [I10] 01/27/2024 Yes      Problems Resolved During this Admission:       Discharged Condition: fair    Disposition: Skilled Nursing Facility    Follow Up:   Follow-up Information       Anthony Emanuel MD Follow up.    Specialty: Family Medicine  Why: Call his office to schedule an appointment within a week after Discharge from Skilled Nursing facility  Contact information:  [address] [address]  [phone]               [facility] Follow up.    Specialty: Skilled Nursing Facility  Contact information:  [address] [place] [address]  [phone]                         Patient Instructions:      Diet Adult Regular     Activity as tolerated       Significant Diagnostic Studies: Labs: CMP   Recent Labs   Lab 02/06/25  0348 02/07/25  0208    142   K 4.1 4.2    107   CO2 23 25   GLU 78 108   BUN 33* 36*   CREATININE 1.2 1.1   CALCIUM 9.0 9.0   PROT 6.2 6.4   ALBUMIN 3.0* 3.1*   BILITOT 0.5 0.5   ALKPHOS 80 83   AST 17 21   ALT 13 11   ANIONGAP 11 10   , CBC   Recent Labs   Lab 02/06/25  0348 02/07/25  0208   WBC 4.39 4.98   HGB 10.9* 11.5*   HCT 34.6* 35.6*    178   , and Troponin   Recent Labs   Lab 02/02/25  1922 02/03/25  0439 02/06/25  0348   TROPONINI 0.068* 0.060* 0.065*     Radiology: CT scan: CT ABDOMEN PELVIS WITHOUT CONTRAST: 02/02/25.  Impression:  Bones are profoundly osteopenic.    This limits sensitivity for acute nondisplaced fractures.   Within constraints, no detectable acute fracture involving the hips.   Age indeterminate minimally displaced fracture involving the distal sacrum/proximal coccyx.    Correlate with clinical findings    Fracture not present on CT done 11/15/2023    Pending Diagnostic Studies:       None           Medications:  Reconciled Home Medications:      Medication List         START taking these medications      acetaminophen 325 MG tablet  Commonly known as: TYLENOL  Take 2 tablets (650 mg total) by mouth every 4 (four) hours as needed for Pain.     HYDROcodone-acetaminophen 5-325 mg per tablet  Commonly known as: NORCO  Take 1 tablet by mouth every 4 (four) hours as needed for Pain.     LIDOcaine 5 %  Commonly known as: LIDODERM  Place 2 patches onto the skin once daily. Remove & Discard patch within 12 hours or as directed by MD            CHANGE how you take these medications      docusate sodium 50 MG capsule  Commonly known as: COLACE  Take 1 capsule (50 mg total) by mouth 2 (two) times daily as needed for Constipation.  What changed:   how much to take  when to take this  reasons to take this     omeprazole 40 MG capsule  Commonly known as: PRILOSEC  Take 40 mg by mouth.  What changed: Another medication with the same name was removed. Continue taking this medication, and follow the directions you see here.            CONTINUE taking these medications      aspirin 81 MG EC tablet  Commonly known as: ECOTRIN  Take 81 mg by mouth every morning.     carvediloL 12.5 MG tablet  Commonly known as: COREG  Take 1 tablet (12.5 mg total) by mouth 2 (two) times daily with meals.     clobetasol 0.05% 0.05 % Oint  Commonly known as: TEMOVATE  APPLY  OINTMENT TOPICALLY TWICE DAILY TO  HEALS     furosemide 40 MG tablet  Commonly known as: LASIX  Take 1 tablet (40 mg total) by mouth once daily.     iron-vitamin C 100-250 mg (ICAR-C) 100-250 mg Tab  Take 1 tablet by mouth once daily.     levothyroxine 88 MCG tablet  Commonly known as: SYNTHROID  Take 1 tablet (88 mcg total) by mouth before breakfast.     * losartan 100 MG tablet  Commonly known as: COZAAR  Take 1 tablet (100 mg total) by mouth once daily.     * losartan 50 MG tablet  Commonly known as: COZAAR  Take 1 tablet (50 mg total) by mouth every evening.     metoclopramide HCl 10 MG tablet  Commonly known as: REGLAN  10 mg.      polyethylene glycol 17 gram Pwpk  Commonly known as: GLYCOLAX  Take 17 g by mouth once daily.     spironolactone 25 MG tablet  Commonly known as: ALDACTONE  Take 1 tablet (25 mg total) by mouth once daily.     STOOL SOFTENER-STIMULANT LAXAT 8.6-50 mg per tablet  Generic drug: senna-docusate 8.6-50 mg  Take by mouth.           * This list has 2 medication(s) that are the same as other medications prescribed for you. Read the directions carefully, and ask your doctor or other care provider to review them with you.                STOP taking these medications      ferrous sulfate 325 mg (65 mg iron) Tab tablet  Commonly known as: FEOSOL     IRON 325 mg (65 mg iron) Tab tablet  Generic drug: ferrous sulfate              Indwelling Lines/Drains at time of discharge:   Lines/Drains/Airways       Drain  Duration             Female External Urinary Catheter w/ Suction 02/05/25 0500 2 days                    Time spent on the discharge of patient: 35 minutes         MEYER MD  Department of Hospital Medicine  [facility]

## 2025-02-07 NOTE — PLAN OF CARE
Family will transport patient to [facility]. Pt will continue SNF CARE. AVS, Discharge Summary, Facility transfer orders have been mailed and faxed to [facility].Pt has been cleared by doctor no further case management needs. All Clear to discharge. Waiting for number to call in report for our nurses.[phone]. [phone]. Nurse has been notified. Family Ms. Magana has been contacted to pick patient up and her sister Nahomy will be here by 12:30.   02/07/25 1101   Final Note   Assessment Type Final Discharge Note   Anticipated Discharge Disposition SNF   Post-Acute Status   Post-Acute Authorization Placement   Post-Acute Placement Status Set-up Complete/Auth obtained   Patient choice form signed by patient/caregiver List with quality metrics by geographic area provided;List from CMS Compare;List from System Post-Acute Care   Discharge Delays None known at this time

## 2025-02-07 NOTE — ASSESSMENT & PLAN NOTE
Likely a chronic elevation in setting of CKD and severe AS  Flat on trend  ECG showed no acute changes  TTE results as above  Denies any chest pain    All pertinent labs within the past 24 hours have been reviewed.    Troponin:   Recent Labs   Lab 02/06/25  0348   TROPONINI 0.065*       Given goals of care and pt/famly wishes no further risk stratification

## 2025-02-07 NOTE — ASSESSMENT & PLAN NOTE
Creatine stable for now. BMP reviewed- noted Estimated Creatinine Clearance: 28.2 mL/min (based on SCr of 1.1 mg/dL). according to latest data.   Based on current GFR, CKD stage is stage 3 - GFR 30-59.    Monitor UOP and serial BMP and adjust therapy as needed.   Renally dose meds.   Avoid nephrotoxic medications and procedures.

## 2025-02-07 NOTE — ASSESSMENT & PLAN NOTE
Anemia is likely due to chronic disease due to Chronic Kidney Disease. Most recent hemoglobin and hematocrit are listed below.  Recent Labs     02/05/25  0441 02/06/25  0348 02/07/25  0208   HGB 10.3* 10.9* 11.5*   HCT 31.8* 34.6* 35.6*       Plan  - Pt's anemia is currently stable  - Pt is on iron supplements  - Pt is not on anticoagulation  - No further treatment at this time

## 2025-02-07 NOTE — NURSING
New meds gone over with patient and family who verbalized understanding. Helped patient get dressed and gather belongings. Patient taken off unit via wheelchair with family at side

## 2025-02-07 NOTE — DISCHARGE INSTRUCTIONS
[facility] Surg  Facility Transfer Orders        Admit to: [facility]    Diagnoses:   Active Hospital Problems    Diagnosis  POA    *Right knee pain [M25.561]  Yes     Priority: 1 - High    Hypomagnesemia [E83.42]  Yes    Debility [R53.81]  Yes    Elevated troponin [R79.89]  Yes    Anemia [D64.9]  Yes    Frail elderly [R54]  Yes    Chronic fatigue [R53.82]  Yes    Severe aortic valve stenosis [I35.0]  Yes    Stage 3a chronic kidney disease [N18.31]  Yes    Hypothyroidism [E03.9]  Yes    Hypertension, greater than 10 years [I10]  Yes      Resolved Hospital Problems   No resolved problems to display.     Allergies: Review of patient's allergies indicates:  No Known Allergies    Code Status: DNR    Vitals: per routione       Diet: regular diet    Activity: Activity as tolerated    Nursing Precautions: Fall    Bed/Surface: Pressure Redistribution    Consults: PT to evaluate and treat- 4 times a week and OT to evaluate and treat- 4 times a week    Oxygen: room air    Dialysis: Patient is not on dialysis.     Labs: NA   Pending Diagnostic Studies:       None          Imaging: NA    Miscellaneous Care:   NA    IV Access: NA     Medications: Discontinue all previous medication orders, if any. See new list below.  Current Discharge Medication List        START taking these medications    Details   acetaminophen (TYLENOL) 325 MG tablet Take 2 tablets (650 mg total) by mouth every 4 (four) hours as needed for Pain.  Qty: 50 tablet, Refills: 0      HYDROcodone-acetaminophen (NORCO) 5-325 mg per tablet Take 1 tablet by mouth every 4 (four) hours as needed for Pain.  Qty: 20 tablet, Refills: 0    Comments: n/a   Associated Diagnoses: Physical debility; Right knee pain, unspecified chronicity; Acquired hypothyroidism      LIDOcaine (LIDODERM) 5 % Place 2 patches onto the skin once daily. Remove & Discard patch within 12 hours or as directed by MD  Qty: 60 patch, Refills: 0           CONTINUE these medications which  have CHANGED    Details   docusate sodium (COLACE) 50 MG capsule Take 1 capsule (50 mg total) by mouth 2 (two) times daily as needed for Constipation.  Qty: 10 capsule, Refills: 0           CONTINUE these medications which have NOT CHANGED    Details   aspirin (ECOTRIN) 81 MG EC tablet Take 81 mg by mouth every morning.      carvediloL (COREG) 12.5 MG tablet Take 1 tablet (12.5 mg total) by mouth 2 (two) times daily with meals.  Qty: 60 tablet, Refills: 11    Comments: .  Associated Diagnoses: Heart failure with reduced ejection fraction      furosemide (LASIX) 40 MG tablet Take 1 tablet (40 mg total) by mouth once daily.  Qty: 30 tablet, Refills: 11      iron-vitamin C 100-250 mg, ICAR-C, 100-250 mg Tab Take 1 tablet by mouth once daily.  Qty: 30 tablet, Refills: 0      levothyroxine (SYNTHROID) 88 MCG tablet Take 1 tablet (88 mcg total) by mouth before breakfast.  Qty: 30 tablet, Refills: 11      !! losartan (COZAAR) 100 MG tablet Take 1 tablet (100 mg total) by mouth once daily.  Qty: 30 tablet, Refills: 11    Comments: .      !! losartan (COZAAR) 50 MG tablet Take 1 tablet (50 mg total) by mouth every evening.  Qty: 90 tablet, Refills: 3    Comments: .  Associated Diagnoses: Heart failure with reduced ejection fraction; Primary hypertension      spironolactone (ALDACTONE) 25 MG tablet Take 1 tablet (25 mg total) by mouth once daily.  Qty: 90 tablet, Refills: 3    Comments: .  Associated Diagnoses: Heart failure with reduced ejection fraction      STOOL SOFTENER-STIMULANT LAXAT 8.6-50 mg per tablet Take by mouth.      clobetasol 0.05% (TEMOVATE) 0.05 % Oint APPLY  OINTMENT TOPICALLY TWICE DAILY TO  HEALS  Qty: 60 g, Refills: 0      metoclopramide HCl (REGLAN) 10 MG tablet 10 mg.      omeprazole (PRILOSEC) 40 MG capsule Take 40 mg by mouth.      polyethylene glycol (GLYCOLAX) 17 gram PwPk Take 17 g by mouth once daily.  Qty: 30 packet, Refills: 0       !! - Potential duplicate medications found. Please discuss  with provider.        STOP taking these medications       IRON 325 mg (65 mg iron) Tab tablet Comments:   Reason for Stopping:         ferrous sulfate (FEOSOL) 325 mg (65 mg iron) Tab tablet Comments:   Reason for Stopping:         omeprazole 20 mg TbEC Comments:   Reason for Stopping:             Follow up:    Follow-up Information       Anthony Emanuel MD Follow up.    Specialty: Family Medicine  Why: Call his office to schedule an appointment within a week after Discharge from Skilled Nursing facility  Contact information:  [address] [address]  [phone]               [place] Follow up.    Specialty: Skilled Nursing Facility  Contact information:  [address] [place] [address]  [phone]                             Immunizations Administered as of 2/7/2025       Name Date Dose VIS Date Route Exp Date    COVID-19, MRNA, LN-S, PF (Pfizer) (Purple Cap) 12/20/2021 0.3 mL -- -- --    Lot: [id number]     External: Auto Reconciled From Outside Source     COVID-19, MRNA, LN-S, PF (Pfizer) (Purple Cap) 4/8/2021 0.3 mL -- -- --    Lot: [id number]     External: Auto Reconciled From Outside Source     COVID-19, MRNA, LN-S, PF (Pfizer) (Purple Cap) 3/16/2021 0.3 mL -- -- --    Lot: [id number]     External: Auto Reconciled From Outside Source             This patient has had both covid vaccinations    Some patients may experience side effects after vaccination.  These may include fever, headache, muscle or joint aches.  Most symptoms resolve with 24-48 hours and do not require urgent medical evaluation unless they persist for more than 72 hours or symptoms are concerning for an unrelated medical condition.          MEYER MD

## 2025-02-07 NOTE — ASSESSMENT & PLAN NOTE
Patient with Chronic debility due to age-related physical debility. The patient's latest AMPAC (Activity Measure for Post Acute Care) Score is listed below.    AM-PAC Score - How much help does the patient need for each activity listed  Basic Mobility Total Score: 11  Turning over in bed (including adjusting bedclothes, sheets and blankets)?: A little  Sitting down on and standing up from a chair with arms (e.g., wheelchair, bedside commode, etc.): A lot  Moving from lying on back to sitting on the side of the bed?: A lot  Moving to and from a bed to a chair (including a wheelchair)?: A lot  Need to walk in hospital room?: Unable  Climbing 3-5 steps with a railing?: Unable    Plan  - Progressive mobility protocol initated  - PT/OT continued at SNF  - Fall precautions in place

## 2025-02-07 NOTE — NURSING
Chart check complete at beginning of shift. Pt AOX4 throughout shift. VSS. Bed in lowest position, locked, alarm set, side rails raised X2, and call light within reach. Rounding done hourly. Continuous telemetry monitoring maintained. IV infusing with MG. No redness or swelling. Educated patient on medication, usage of call light, and inpatient plan of care. Patient has no c/o of pain or discomfort.

## 2025-02-07 NOTE — PLAN OF CARE
Problem: Adult Inpatient Plan of Care  Goal: Plan of Care Review  Outcome: Progressing  Goal: Patient-Specific Goal (Individualized)  Outcome: Progressing  Goal: Absence of Hospital-Acquired Illness or Injury  Outcome: Progressing  Goal: Optimal Comfort and Wellbeing  Outcome: Progressing  Goal: Readiness for Transition of Care  Outcome: Progressing     Problem: Skin Injury Risk Increased  Goal: Skin Health and Integrity  Outcome: Progressing     Problem: Fall Injury Risk  Goal: Absence of Fall and Fall-Related Injury  Outcome: Progressing     Problem: Pain Acute  Goal: Optimal Pain Control and Function  Outcome: Progressing

## 2025-02-24 ENCOUNTER — OUTSIDE PLACE OF SERVICE (OUTPATIENT)
Facility: HOSPITAL | Age: OVER 89
End: 2025-02-24
Payer: MEDICARE

## 2025-02-24 ENCOUNTER — OUTSIDE PLACE OF SERVICE (OUTPATIENT)
Dept: HEPATOLOGY | Facility: CLINIC | Age: OVER 89
End: 2025-02-24
Payer: MEDICARE

## 2025-03-21 DIAGNOSIS — I50.20 HEART FAILURE WITH REDUCED EJECTION FRACTION: ICD-10-CM

## 2025-03-24 RX ORDER — SPIRONOLACTONE 25 MG/1
25 TABLET ORAL
Qty: 90 TABLET | Refills: 3 | Status: SHIPPED | OUTPATIENT
Start: 2025-03-24

## 2025-04-23 DIAGNOSIS — I50.20 HEART FAILURE WITH REDUCED EJECTION FRACTION: ICD-10-CM

## 2025-04-23 DIAGNOSIS — I10 PRIMARY HYPERTENSION: ICD-10-CM

## 2025-04-24 RX ORDER — LOSARTAN POTASSIUM 50 MG/1
50 TABLET ORAL NIGHTLY
Qty: 90 TABLET | Refills: 2 | Status: SHIPPED | OUTPATIENT
Start: 2025-04-24

## 2025-05-02 DIAGNOSIS — I50.20 HEART FAILURE WITH REDUCED EJECTION FRACTION: ICD-10-CM

## 2025-05-05 RX ORDER — CARVEDILOL 12.5 MG/1
12.5 TABLET ORAL 2 TIMES DAILY WITH MEALS
Qty: 120 TABLET | Refills: 2 | Status: SHIPPED | OUTPATIENT
Start: 2025-05-05

## 2025-05-13 ENCOUNTER — OFFICE VISIT (OUTPATIENT)
Dept: PODIATRY | Facility: CLINIC | Age: OVER 89
End: 2025-05-13
Payer: MEDICARE

## 2025-05-13 VITALS
RESPIRATION RATE: 18 BRPM | HEART RATE: 72 BPM | DIASTOLIC BLOOD PRESSURE: 73 MMHG | WEIGHT: 120 LBS | HEIGHT: 66 IN | BODY MASS INDEX: 19.29 KG/M2 | SYSTOLIC BLOOD PRESSURE: 135 MMHG

## 2025-05-13 DIAGNOSIS — M20.42 HAMMER TOES OF BOTH FEET: ICD-10-CM

## 2025-05-13 DIAGNOSIS — B35.1 ONYCHOMYCOSIS OF TOENAIL: ICD-10-CM

## 2025-05-13 DIAGNOSIS — M79.675 PAIN IN LEFT TOE(S): ICD-10-CM

## 2025-05-13 DIAGNOSIS — M20.11 HALLUX ABDUCTO VALGUS, BILATERAL: Primary | ICD-10-CM

## 2025-05-13 DIAGNOSIS — M20.12 HALLUX ABDUCTO VALGUS, BILATERAL: Primary | ICD-10-CM

## 2025-05-13 DIAGNOSIS — L84 FOOT CALLUS: ICD-10-CM

## 2025-05-13 DIAGNOSIS — M20.41 HAMMER TOES OF BOTH FEET: ICD-10-CM

## 2025-05-13 PROCEDURE — 99999 PR PBB SHADOW E&M-EST. PATIENT-LVL IV: CPT | Mod: PBBFAC,,, | Performed by: PODIATRIST

## 2025-05-13 PROCEDURE — 99214 OFFICE O/P EST MOD 30 MIN: CPT | Mod: PBBFAC | Performed by: PODIATRIST

## 2025-05-13 PROCEDURE — 99213 OFFICE O/P EST LOW 20 MIN: CPT | Mod: S$PBB,,, | Performed by: PODIATRIST

## 2025-05-13 RX ORDER — CYANOCOBALAMIN 1000 UG/ML
INJECTION, SOLUTION INTRAMUSCULAR; SUBCUTANEOUS
COMMUNITY
Start: 2025-04-18

## 2025-05-13 RX ORDER — POLYVINYL ALCOHOL, POVIDONE 14; 6 MG/ML; MG/ML
SOLUTION/ DROPS OPHTHALMIC
COMMUNITY
Start: 2025-02-10

## 2025-05-13 RX ORDER — MULTIVITAMIN WITH MINERALS
TABLET ORAL EVERY MORNING
COMMUNITY
Start: 2024-12-04

## 2025-05-13 RX ORDER — LANOLIN ALCOHOL/MO/W.PET/CERES
1 CREAM (GRAM) TOPICAL
COMMUNITY
Start: 2025-04-22

## 2025-05-16 NOTE — PROGRESS NOTES
Subjective:       Patient ID: Re Arciniega is a 89 y.o. female.    Chief Complaint: Follow-up, Nail Problem, Hammer Toe, Callouses, and Foot Swelling  Patient presents with her daughter for follow-up pain in toes, calluses on the back of the heels, chronic lower extremity edema, bunion and hammertoe deformities  Daughter relates patient's granddaughter has been soaking treating calluses on the back of the heels more often  Patient is complained toes are sore  She relates there has been no change regarding calluses on the 2nd digit, no recurrence, skin in this area has continued to do very well  Relates no change in swelling      Past Medical History:   Diagnosis Date    Anemia     CHF (congestive heart failure)     Constipation     Hypertension     Thyroid disease      Past Surgical History:   Procedure Laterality Date    CHOLECYSTECTOMY      HYSTERECTOMY       No family history on file.  Social History     Socioeconomic History    Marital status:    Tobacco Use    Smoking status: Never    Smokeless tobacco: Never   Substance and Sexual Activity    Alcohol use: No    Drug use: No    Sexual activity: Never     Social Drivers of Health     Financial Resource Strain: Low Risk  (2/7/2025)    Overall Financial Resource Strain (CARDIA)     Difficulty of Paying Living Expenses: Not very hard   Food Insecurity: No Food Insecurity (2/7/2025)    Hunger Vital Sign     Worried About Running Out of Food in the Last Year: Never true     Ran Out of Food in the Last Year: Never true   Transportation Needs: No Transportation Needs (2/7/2025)    TRANSPORTATION NEEDS     Transportation : No   Physical Activity: Inactive (4/12/2024)    Exercise Vital Sign     Days of Exercise per Week: 0 days     Minutes of Exercise per Session: 0 min   Stress: No Stress Concern Present (2/7/2025)    [employer] [place] [employer] - Occupational Stress Questionnaire     Feeling of Stress : Only a little   Housing Stability: Unknown  (2/7/2025)    Housing Stability Vital Sign     Unable to Pay for Housing in the Last Year: No     Homeless in the Last Year: No       Current Outpatient Medications   Medication Sig Dispense Refill    aspirin (ECOTRIN) 81 MG EC tablet Take 81 mg by mouth every morning.      carvediloL (COREG) 12.5 MG tablet TAKE 1 TABLET BY MOUTH TWICE DAILY WITH MEALS 120 tablet 2    clobetasol 0.05% (TEMOVATE) 0.05 % Oint APPLY  OINTMENT TOPICALLY TWICE DAILY TO  HEALS 60 g 0    cyanocobalamin 1,000 mcg/mL injection INJECT 1 ML INTRAMUSCULARLY ONCE EVERY MONTH FOR 6 MONTHS      levothyroxine (SYNTHROID) 88 MCG tablet Take 1 tablet (88 mcg total) by mouth before breakfast. 30 tablet 11    magnesium oxide (MAG-OX) 400 mg (241.3 mg magnesium) tablet Take 1 tablet by mouth.      metoclopramide HCl (REGLAN) 10 MG tablet 10 mg.      omeprazole (PRILOSEC) 40 MG capsule Take 40 mg by mouth.      polyethylene glycol (GLYCOLAX) 17 gram PwPk Take 17 g by mouth once daily. 30 packet 0    REFRESH CLASSIC, PF, 1.4-0.6 % Dpet       spironolactone (ALDACTONE) 25 MG tablet Take 1 tablet by mouth once daily 90 tablet 3    STOOL SOFTENER-STIMULANT LAXAT 8.6-50 mg per tablet Take by mouth.      furosemide (LASIX) 40 MG tablet Take 1 tablet (40 mg total) by mouth once daily. 30 tablet 11    IRON 325 mg (65 mg iron) Tab tablet Take by mouth every morning.      iron-vitamin C 100-250 mg, ICAR-C, 100-250 mg Tab Take 1 tablet by mouth once daily. 30 tablet 0    losartan (COZAAR) 100 MG tablet Take 1 tablet (100 mg total) by mouth once daily. 30 tablet 11    losartan (COZAAR) 50 MG tablet Take 1 tablet by mouth in the evening 90 tablet 2     No current facility-administered medications for this visit.     Review of patient's allergies indicates:  No Known Allergies    Review of Systems   Cardiovascular:  Positive for leg swelling.        Significant improvement    Musculoskeletal:  Positive for gait problem.        Walker   All other systems reviewed and  "are negative.      Objective:      Vitals:    05/13/25 1422   BP: 135/73   Pulse: 72   Resp: 18   Weight: 54.4 kg (120 lb)   Height: 5' 6" (1.676 m)     Physical Exam  Vitals and nursing note reviewed. Exam conducted with a chaperone present.   Constitutional:       General: She is not in acute distress.     Appearance: Normal appearance.   Cardiovascular:      Pulses:           Dorsalis pedis pulses are 1+ on the right side and 1+ on the left side.        Posterior tibial pulses are 0 on the right side and 0 on the left side.   Musculoskeletal:         General: Tenderness and deformity present.      Right foot: Decreased range of motion. Deformity (hammertoe 2nd b/l) and bunion present.      Left foot: Decreased range of motion. Deformity and bunion present.      Comments: All digits are tender and hypersensitive to   Feet:      Right foot:      Skin integrity: Callus present.      Toenail Condition: Right toenails are abnormally thick. Fungal disease present.     Left foot:      Skin integrity: Callus (Much improved chronic calluses plantar lateral bilateral heels some peeling skin medial right heel) present.      Toenail Condition: Left toenails are abnormally thick. Fungal disease present.  Skin:     Capillary Refill: Capillary refill takes more than 3 seconds.   Neurological:      General: No focal deficit present.      Mental Status: She is alert.      Comments: Significant decreased neuritis pain forefoot bilateral   Psychiatric:         Thought Content: Thought content normal.                      Assessment:       1. Hallux abducto valgus, bilateral    2. Hammer toes of both feet    3. Pain in left toe(s)    4. Foot callus    5. Onychomycosis of toenail              Plan:         Reviewed bunions, hammertoes, arthritis swelling and hypersensitivity to the toes  Reviewed inflammation affecting pain in the joints and nerves in the front of the feet  Instructed on application Voltaren gel to the front of the " feet daily  Reviewed soaking warm water and Epson salt  Advised no recurrence of chronic preulcerative calluses on the side of the 2nd digits, this is an area of previous ulcer on the left foot, these areas are doing well, dry and stable  Explained if there is a increased swelling these most likely will returned due to pressure, needs to monitor regularly  Need to keep skin clean especially between the toes and dry between the toes  Advised calluses on the back of the heel are much improved  Reviewed continued care and maintenance of skin, calluses  Reviewed potential complications  Reviewed how important it is to control swelling feet and lower legs   Elevation at all times at home  Keep skin well hydrated  Reviewed care and maintenance of nails, fungal involvement, treatment, topical, soaking.  Nails debrided in thickness reduced with no other areas of complication at this time  Check feet daily and contact office with any area of concern  Patient was in understanding and agreement with treatment plan.  I counseled the patient on their conditions, implications and medical management.  Instructed patient/family to contact the office with any changes, questions, concerns, worsening of symptoms.   Total face to face time 20 minutes, exam, assessment, treatment, discussion, additional time for review of chart prior to and following appointment and visit documentation, consultation and coordination of care.    Follow up as needed      This note was created using [employer] voice recognition software that occasionally misinterpreted phrases or words.

## 2025-06-09 RX ORDER — CLOBETASOL PROPIONATE 0.5 MG/G
OINTMENT TOPICAL
Qty: 60 G | Refills: 0 | Status: SHIPPED | OUTPATIENT
Start: 2025-06-09

## 2025-06-09 NOTE — TELEPHONE ENCOUNTER
Refill request for clobetasol 0.05% ointment sent to [facility] in [place].     Last office visit was 05/13/2025.    CHINMAY Caban 06/09/2025

## 2025-07-14 RX ORDER — CLOBETASOL PROPIONATE 0.5 MG/G
OINTMENT TOPICAL
Qty: 60 G | Refills: 0 | Status: SHIPPED | OUTPATIENT
Start: 2025-07-14

## 2025-07-14 NOTE — TELEPHONE ENCOUNTER
Refill request for temovate ointment sent to [facility] in [place].     Last office visit was 05/13/2025.    CHINMAY Caban 07/14/2025

## 2025-07-21 ENCOUNTER — PATIENT MESSAGE (OUTPATIENT)
Dept: CARDIOLOGY | Facility: CLINIC | Age: OVER 89
End: 2025-07-21
Payer: MEDICARE

## 2025-07-21 ENCOUNTER — TELEPHONE (OUTPATIENT)
Dept: CARDIOLOGY | Facility: CLINIC | Age: OVER 89
End: 2025-07-21
Payer: MEDICARE

## 2025-08-13 ENCOUNTER — OFFICE VISIT (OUTPATIENT)
Dept: CARDIOLOGY | Facility: CLINIC | Age: OVER 89
End: 2025-08-13
Payer: MEDICARE

## 2025-08-13 VITALS
SYSTOLIC BLOOD PRESSURE: 138 MMHG | WEIGHT: 121 LBS | HEART RATE: 73 BPM | DIASTOLIC BLOOD PRESSURE: 60 MMHG | HEIGHT: 66 IN | BODY MASS INDEX: 19.44 KG/M2 | OXYGEN SATURATION: 98 %

## 2025-08-13 DIAGNOSIS — D64.9 ANEMIA, UNSPECIFIED TYPE: ICD-10-CM

## 2025-08-13 DIAGNOSIS — E03.9 ACQUIRED HYPOTHYROIDISM: ICD-10-CM

## 2025-08-13 DIAGNOSIS — G47.19 EXCESSIVE DAYTIME SLEEPINESS: ICD-10-CM

## 2025-08-13 DIAGNOSIS — I44.7 LBBB (LEFT BUNDLE BRANCH BLOCK): ICD-10-CM

## 2025-08-13 DIAGNOSIS — I83.12 VARICOSE VEINS OF BOTH LOWER EXTREMITIES WITH INFLAMMATION: ICD-10-CM

## 2025-08-13 DIAGNOSIS — E88.09 HYPOALBUMINEMIA DUE TO PROTEIN-CALORIE MALNUTRITION: ICD-10-CM

## 2025-08-13 DIAGNOSIS — R54 FRAIL ELDERLY: ICD-10-CM

## 2025-08-13 DIAGNOSIS — N18.31 STAGE 3A CHRONIC KIDNEY DISEASE: ICD-10-CM

## 2025-08-13 DIAGNOSIS — I10 PRIMARY HYPERTENSION: ICD-10-CM

## 2025-08-13 DIAGNOSIS — I35.0 SEVERE AORTIC VALVE STENOSIS: Primary | ICD-10-CM

## 2025-08-13 DIAGNOSIS — R60.0 PERIPHERAL EDEMA: ICD-10-CM

## 2025-08-13 DIAGNOSIS — I50.20 HEART FAILURE WITH REDUCED EJECTION FRACTION: ICD-10-CM

## 2025-08-13 DIAGNOSIS — R79.89 PRERENAL AZOTEMIA: ICD-10-CM

## 2025-08-13 DIAGNOSIS — I83.11 VARICOSE VEINS OF BOTH LOWER EXTREMITIES WITH INFLAMMATION: ICD-10-CM

## 2025-08-13 DIAGNOSIS — Z91.89 SEDENTARY LIFESTYLE: ICD-10-CM

## 2025-08-13 DIAGNOSIS — E46 HYPOALBUMINEMIA DUE TO PROTEIN-CALORIE MALNUTRITION: ICD-10-CM

## 2025-08-13 PROCEDURE — 99213 OFFICE O/P EST LOW 20 MIN: CPT | Mod: PBBFAC | Performed by: INTERNAL MEDICINE

## 2025-08-13 PROCEDURE — 99214 OFFICE O/P EST MOD 30 MIN: CPT | Mod: S$PBB,,, | Performed by: INTERNAL MEDICINE

## 2025-08-13 PROCEDURE — 99999 PR PBB SHADOW E&M-EST. PATIENT-LVL III: CPT | Mod: PBBFAC,,, | Performed by: INTERNAL MEDICINE
